# Patient Record
Sex: FEMALE | Race: BLACK OR AFRICAN AMERICAN | NOT HISPANIC OR LATINO | Employment: OTHER | URBAN - METROPOLITAN AREA
[De-identification: names, ages, dates, MRNs, and addresses within clinical notes are randomized per-mention and may not be internally consistent; named-entity substitution may affect disease eponyms.]

---

## 2017-04-26 ENCOUNTER — OFFICE VISIT (OUTPATIENT)
Dept: INTERNAL MEDICINE | Facility: CLINIC | Age: 82
End: 2017-04-26
Payer: MEDICARE

## 2017-04-26 VITALS
TEMPERATURE: 98 F | HEART RATE: 64 BPM | HEIGHT: 60 IN | BODY MASS INDEX: 15.33 KG/M2 | SYSTOLIC BLOOD PRESSURE: 150 MMHG | DIASTOLIC BLOOD PRESSURE: 66 MMHG | WEIGHT: 78.06 LBS
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
## 2017-04-26 DIAGNOSIS — M25.551 RIGHT HIP PAIN: Primary | ICD-10-CM

## 2017-04-26 DIAGNOSIS — N18.4 STAGE 4 CHRONIC KIDNEY DISEASE: ICD-10-CM

## 2017-04-26 DIAGNOSIS — I10 ESSENTIAL HYPERTENSION: ICD-10-CM

## 2017-04-26 PROCEDURE — 1126F AMNT PAIN NOTED NONE PRSNT: CPT | Mod: S$GLB,,, | Performed by: FAMILY MEDICINE

## 2017-04-26 PROCEDURE — 99213 OFFICE O/P EST LOW 20 MIN: CPT | Mod: S$GLB,,, | Performed by: FAMILY MEDICINE

## 2017-04-26 PROCEDURE — 99999 PR PBB SHADOW E&M-EST. PATIENT-LVL III: CPT | Mod: PBBFAC,,, | Performed by: FAMILY MEDICINE

## 2017-04-26 PROCEDURE — 1159F MED LIST DOCD IN RCRD: CPT | Mod: S$GLB,,, | Performed by: FAMILY MEDICINE

## 2017-04-26 PROCEDURE — 1160F RVW MEDS BY RX/DR IN RCRD: CPT | Mod: S$GLB,,, | Performed by: FAMILY MEDICINE

## 2017-04-26 PROCEDURE — 99499 UNLISTED E&M SERVICE: CPT | Mod: S$GLB,,, | Performed by: FAMILY MEDICINE

## 2017-04-26 RX ORDER — AMLODIPINE BESYLATE 5 MG/1
5 TABLET ORAL DAILY
Qty: 90 TABLET | Refills: 0 | Status: SHIPPED | OUTPATIENT
Start: 2017-04-26 | End: 2017-10-11 | Stop reason: SDUPTHER

## 2017-04-26 RX ORDER — TRAMADOL HYDROCHLORIDE 50 MG/1
25 TABLET ORAL EVERY 12 HOURS PRN
Qty: 20 TABLET | Refills: 0 | Status: SHIPPED | OUTPATIENT
Start: 2017-04-26 | End: 2017-10-11 | Stop reason: SDUPTHER

## 2017-04-26 NOTE — PROGRESS NOTES
Subjective:   Patient ID: Cesilia Delgado is a 86 y.o. female.    Chief Complaint: Hip Pain (hit it on the tub ) and Diarrhea (better today clear up on last evening)      HPI  85 yo female here with son. No diarrhea. This problem has resolved. She is here bc she accidentally sat down roughly in tub about one foot two days ago and right hip hurts. She is not limping. Pain is 6/10 and improving without any treatment. Son is concerned about possible fracture.  Patient queried and denies any further complaints.      ALLERGIES AND MEDICATIONS: updated and reviewed.  Review of patient's allergies indicates:  No Known Allergies    Current Outpatient Prescriptions:     amlodipine (NORVASC) 5 MG tablet, Take 1 tablet (5 mg total) by mouth once daily., Disp: 90 tablet, Rfl: 0    ferrous sulfate 325 mg (65 mg iron) Tab tablet, Take 1 tablet by mouth once daily., Disp: , Rfl: 0    nut.tx.impaired renal fxn,soy (NEPRO) 0.08-1.80 gram-kcal/mL Liqd, Take 1 Can by mouth once daily., Disp: 90 Can, Rfl: 4    omeprazole (PRILOSEC) 40 MG capsule, Take 1 capsule (40 mg total) by mouth 2 (two) times daily before meals., Disp: 180 capsule, Rfl: 3    pantoprazole (PROTONIX) 40 MG tablet, Take 1 tablet (40 mg total) by mouth 2 (two) times daily., Disp: 180 tablet, Rfl: 3    tramadol (ULTRAM) 50 mg tablet, Take 0.5 tablets (25 mg total) by mouth every 12 (twelve) hours as needed for Pain., Disp: 20 tablet, Rfl: 0    Review of Systems   Constitutional: Negative for chills, fatigue and fever.   Respiratory: Negative for cough and stridor.    Cardiovascular: Negative for chest pain and palpitations.   Musculoskeletal: Negative for back pain and myalgias.       Objective:     Vitals:    04/26/17 1603   BP: (!) 150/66   Pulse: 64   Temp: 97.7 °F (36.5 °C)   TempSrc: Oral   Weight: 35.4 kg (78 lb 0.7 oz)   Height: 5' (1.524 m)   PainSc: 0-No pain     Body mass index is 15.24 kg/(m^2).    Physical Exam   Constitutional: She appears  well-developed and well-nourished.   HENT:   Head: Normocephalic and atraumatic.   Right Ear: External ear normal.   Left Ear: External ear normal.   Pulmonary/Chest: Effort normal and breath sounds normal.   Abdominal: Soft. Bowel sounds are normal.   Musculoskeletal:        Right hip: She exhibits bony tenderness. She exhibits normal range of motion, normal strength, no tenderness, no swelling, no crepitus, no deformity and no laceration.   Neurological: She is alert.   Skin: Skin is warm and dry.   Psychiatric: She has a normal mood and affect. Her behavior is normal.   Nursing note and vitals reviewed.      Assessment and Plan:   Cesilia was seen today for hip pain and diarrhea.    Diagnoses and all orders for this visit:    Right hip pain  -     X-Ray Hip 2 View Right; Future    Fall, initial encounter    Essential hypertension    Stage 4 chronic kidney disease    Other orders  -     amlodipine (NORVASC) 5 MG tablet; Take 1 tablet (5 mg total) by mouth once daily.  -     tramadol (ULTRAM) 50 mg tablet; Take 0.5 tablets (25 mg total) by mouth every 12 (twelve) hours as needed for Pain.        Return in about 1 week (around 5/3/2017).    THIS NOTE WILL BE SHARED WITH THE PATIENT.

## 2017-04-27 ENCOUNTER — HOSPITAL ENCOUNTER (OUTPATIENT)
Dept: RADIOLOGY | Facility: HOSPITAL | Age: 82
Discharge: HOME | End: 2017-04-27
Attending: FAMILY MEDICINE
Payer: MEDICARE

## 2017-04-27 ENCOUNTER — TELEPHONE (OUTPATIENT)
Dept: INTERNAL MEDICINE | Facility: CLINIC | Age: 82
End: 2017-04-27

## 2017-04-27 DIAGNOSIS — M25.551 RIGHT HIP PAIN: ICD-10-CM

## 2017-04-27 PROCEDURE — 73502 X-RAY EXAM HIP UNI 2-3 VIEWS: CPT | Mod: 26,RT,, | Performed by: RADIOLOGY

## 2017-04-27 PROCEDURE — 73502 X-RAY EXAM HIP UNI 2-3 VIEWS: CPT | Mod: TC,RT

## 2017-04-28 ENCOUNTER — TELEPHONE (OUTPATIENT)
Dept: INTERNAL MEDICINE | Facility: CLINIC | Age: 82
End: 2017-04-28

## 2017-04-28 NOTE — TELEPHONE ENCOUNTER
Called pt son; informed him xray was normal. He did not have any questions or concerns at this time.

## 2017-10-11 RX ORDER — AMLODIPINE BESYLATE 5 MG/1
TABLET ORAL
Qty: 90 TABLET | Refills: 3 | Status: SHIPPED | OUTPATIENT
Start: 2017-10-11 | End: 2018-10-02 | Stop reason: SDUPTHER

## 2017-10-11 RX ORDER — TRAMADOL HYDROCHLORIDE 50 MG/1
TABLET ORAL
Qty: 30 TABLET | Refills: 3 | Status: ON HOLD | OUTPATIENT
Start: 2017-10-11 | End: 2018-12-23

## 2017-10-31 ENCOUNTER — TELEPHONE (OUTPATIENT)
Dept: NEPHROLOGY | Facility: CLINIC | Age: 82
End: 2017-10-31

## 2017-10-31 NOTE — TELEPHONE ENCOUNTER
Called pt awa cardoza to inform her ill have to talk with dr shay to see what day he can work her mother n law in    ----- Message from Estela Castaneda sent at 10/30/2017  5:13 PM CDT -----  Contact: Jose Enrique moe in law Estela can be reached at [phone]  Estela is calling to reschedule appt sometime next week.      Thank you!

## 2017-11-13 ENCOUNTER — LAB VISIT (OUTPATIENT)
Dept: LAB | Facility: HOSPITAL | Age: 82
End: 2017-11-13
Attending: INTERNAL MEDICINE
Payer: MEDICARE

## 2017-11-13 ENCOUNTER — OFFICE VISIT (OUTPATIENT)
Dept: INTERNAL MEDICINE | Facility: CLINIC | Age: 82
End: 2017-11-13
Payer: MEDICARE

## 2017-11-13 VITALS
TEMPERATURE: 98 F | BODY MASS INDEX: 13.62 KG/M2 | WEIGHT: 79.81 LBS | DIASTOLIC BLOOD PRESSURE: 82 MMHG | RESPIRATION RATE: 14 BRPM | HEIGHT: 64 IN | HEART RATE: 76 BPM | SYSTOLIC BLOOD PRESSURE: 182 MMHG

## 2017-11-13 DIAGNOSIS — E46 MALNUTRITION, UNSPECIFIED TYPE: Chronic | ICD-10-CM

## 2017-11-13 DIAGNOSIS — N18.4 STAGE 4 CHRONIC KIDNEY DISEASE: ICD-10-CM

## 2017-11-13 DIAGNOSIS — K21.9 GASTROESOPHAGEAL REFLUX DISEASE, ESOPHAGITIS PRESENCE NOT SPECIFIED: ICD-10-CM

## 2017-11-13 DIAGNOSIS — I10 ESSENTIAL HYPERTENSION: Primary | ICD-10-CM

## 2017-11-13 DIAGNOSIS — I10 ESSENTIAL HYPERTENSION: ICD-10-CM

## 2017-11-13 DIAGNOSIS — M25.551 RIGHT HIP PAIN: ICD-10-CM

## 2017-11-13 LAB
ALBUMIN SERPL BCP-MCNC: 4 G/DL
ALP SERPL-CCNC: 63 U/L
ALT SERPL W/O P-5'-P-CCNC: 6 U/L
ANION GAP SERPL CALC-SCNC: 10 MMOL/L
ANISOCYTOSIS BLD QL SMEAR: SLIGHT
AST SERPL-CCNC: 18 U/L
BASOPHILS # BLD AUTO: 0.05 K/UL
BASOPHILS NFR BLD: 0.8 %
BILIRUB SERPL-MCNC: 0.9 MG/DL
BUN SERPL-MCNC: 26 MG/DL
BURR CELLS BLD QL SMEAR: ABNORMAL
CALCIUM SERPL-MCNC: 10.7 MG/DL
CHLORIDE SERPL-SCNC: 104 MMOL/L
CHOLEST SERPL-MCNC: 259 MG/DL
CHOLEST/HDLC SERPL: 3.9 {RATIO}
CO2 SERPL-SCNC: 23 MMOL/L
CREAT SERPL-MCNC: 1.9 MG/DL
DIFFERENTIAL METHOD: ABNORMAL
EOSINOPHIL # BLD AUTO: 0.1 K/UL
EOSINOPHIL NFR BLD: 1.7 %
ERYTHROCYTE [DISTWIDTH] IN BLOOD BY AUTOMATED COUNT: 18 %
EST. GFR  (AFRICAN AMERICAN): 26.9 ML/MIN/1.73 M^2
EST. GFR  (NON AFRICAN AMERICAN): 23.4 ML/MIN/1.73 M^2
GLUCOSE SERPL-MCNC: 72 MG/DL
HCT VFR BLD AUTO: 31.1 %
HDLC SERPL-MCNC: 66 MG/DL
HDLC SERPL: 25.5 %
HGB BLD-MCNC: 10.3 G/DL
IMM GRANULOCYTES # BLD AUTO: 0.01 K/UL
IMM GRANULOCYTES NFR BLD AUTO: 0.2 %
LDLC SERPL CALC-MCNC: 170.2 MG/DL
LYMPHOCYTES # BLD AUTO: 1.3 K/UL
LYMPHOCYTES NFR BLD: 22.7 %
MCH RBC QN AUTO: 24.5 PG
MCHC RBC AUTO-ENTMCNC: 33.1 G/DL
MCV RBC AUTO: 74 FL
MONOCYTES # BLD AUTO: 0.3 K/UL
MONOCYTES NFR BLD: 5.6 %
NEUTROPHILS # BLD AUTO: 4.1 K/UL
NEUTROPHILS NFR BLD: 69 %
NONHDLC SERPL-MCNC: 193 MG/DL
NRBC BLD-RTO: 0 /100 WBC
OVALOCYTES BLD QL SMEAR: ABNORMAL
PLATELET # BLD AUTO: 198 K/UL
PMV BLD AUTO: ABNORMAL FL
POIKILOCYTOSIS BLD QL SMEAR: SLIGHT
POTASSIUM SERPL-SCNC: 4.4 MMOL/L
PROT SERPL-MCNC: 8.3 G/DL
RBC # BLD AUTO: 4.21 M/UL
SODIUM SERPL-SCNC: 137 MMOL/L
TARGETS BLD QL SMEAR: ABNORMAL
TRIGL SERPL-MCNC: 114 MG/DL
TSH SERPL DL<=0.005 MIU/L-ACNC: 1.2 UIU/ML
WBC # BLD AUTO: 5.91 K/UL

## 2017-11-13 PROCEDURE — 80053 COMPREHEN METABOLIC PANEL: CPT

## 2017-11-13 PROCEDURE — 99214 OFFICE O/P EST MOD 30 MIN: CPT | Mod: S$GLB,,, | Performed by: INTERNAL MEDICINE

## 2017-11-13 PROCEDURE — 99499 UNLISTED E&M SERVICE: CPT | Mod: S$GLB,,, | Performed by: INTERNAL MEDICINE

## 2017-11-13 PROCEDURE — 36415 COLL VENOUS BLD VENIPUNCTURE: CPT | Mod: PO

## 2017-11-13 PROCEDURE — 99999 PR PBB SHADOW E&M-EST. PATIENT-LVL III: CPT | Mod: PBBFAC,,, | Performed by: INTERNAL MEDICINE

## 2017-11-13 PROCEDURE — 90662 IIV NO PRSV INCREASED AG IM: CPT | Mod: S$GLB,,, | Performed by: INTERNAL MEDICINE

## 2017-11-13 PROCEDURE — G0008 ADMIN INFLUENZA VIRUS VAC: HCPCS | Mod: S$GLB,,, | Performed by: INTERNAL MEDICINE

## 2017-11-13 PROCEDURE — 84443 ASSAY THYROID STIM HORMONE: CPT

## 2017-11-13 PROCEDURE — 80061 LIPID PANEL: CPT

## 2017-11-13 NOTE — PROGRESS NOTES
Subjective:       Patient ID: Cesilia Delgado is a 87 y.o. female.    Chief Complaint: Follow-up (blood pressure)    HPI     87-year-old female here for blood pressure follow-up.    HTN - Patient is currently on Norvasc 5 mg. She does not check her BP at home. Side effects of medications note: none. Denies headaches, blurred vision, chest pain, shortness of breath, nausea.  Cholesterol   Date Value Ref Range Status   09/14/2016 192 120 - 199 mg/dL Final     Comment:     The National Cholesterol Education Program (NCEP) has set the  following guidelines (reference ranges) for Cholesterol:  Optimal.....................<200 mg/dL  Borderline High.............200-239 mg/dL  High........................> or = 240 mg/dL       Triglycerides   Date Value Ref Range Status   09/14/2016 123 30 - 150 mg/dL Final     Comment:     The National Cholesterol Education Program (NCEP) has set the  following guidelines (reference values) for triglycerides:  Normal......................<150 mg/dL  Borderline High.............150-199 mg/dL  High........................200-499 mg/dL       HDL   Date Value Ref Range Status   09/14/2016 44 40 - 75 mg/dL Final     Comment:     The National Cholesterol Education Program (NCEP) has set the  following guidelines (reference values) for HDL Cholesterol:  Low...............<40 mg/dL  Optimal...........>60 mg/dL       LDL Cholesterol   Date Value Ref Range Status   09/14/2016 123.4 63.0 - 159.0 mg/dL Final     Comment:     The National Cholesterol Education Program (NCEP) has set the  following guidelines (reference values) for LDL Cholesterol:  Optimal.......................<130 mg/dL  Borderline High...............130-159 mg/dL  High..........................160-189 mg/dL  Very High.....................>190 mg/dL       GERD - she is not taking anything currently for her reflux.    She has tramadol for pain.  She has pain in her right hip and thigh.  This hurts daily when she moves.      Review of  Systems    Objective:      Physical Exam   Constitutional: She is oriented to person, place, and time. She appears well-developed and well-nourished.   HENT:   Head: Normocephalic and atraumatic.   Mouth/Throat: No oropharyngeal exudate.   Eyes: EOM are normal. Pupils are equal, round, and reactive to light. Right eye exhibits no discharge. Left eye exhibits no discharge. No scleral icterus.   Neck: Normal range of motion. Neck supple. No tracheal deviation present. No thyromegaly present.   Cardiovascular: Normal rate, regular rhythm and normal heart sounds.  Exam reveals no gallop and no friction rub.    No murmur heard.  Pulmonary/Chest: Effort normal and breath sounds normal. No respiratory distress. She has no wheezes. She has no rales. She exhibits no tenderness.   Abdominal: Soft. Bowel sounds are normal. She exhibits no distension and no mass. There is no tenderness. There is no rebound and no guarding.   Musculoskeletal: Normal range of motion. She exhibits no edema or tenderness.   Neurological: She is alert and oriented to person, place, and time.   Skin: Skin is warm and dry. No rash noted. No erythema. No pallor.   Psychiatric: She has a normal mood and affect. Her behavior is normal.   Vitals reviewed.      Assessment:       1. Essential hypertension    2. Gastroesophageal reflux disease, esophagitis presence not specified    3. Stage 4 chronic kidney disease    4. Right hip pain    5. Malnutrition, unspecified type        Plan:       1.  Amlodipine 5 mg.  Patient advised check blood pressure weekly at the pharmacy for the next 3 weeks and let us noted is running.  2.  Monitor.  3.  Monitor.  4.  Discussed x-raying the hip again, decided against, because patient had an extra looked pretty good in April.  Continue with tramadol as needed.  Son states patient is not complaining overly much better hip.  5.  Encouraged supplementation with boost or with ensure.

## 2018-05-21 ENCOUNTER — TELEPHONE (OUTPATIENT)
Dept: INTERNAL MEDICINE | Facility: CLINIC | Age: 83
End: 2018-05-21

## 2018-05-21 DIAGNOSIS — I10 ESSENTIAL HYPERTENSION: ICD-10-CM

## 2018-05-21 DIAGNOSIS — Z00.00 ROUTINE GENERAL MEDICAL EXAMINATION AT A HEALTH CARE FACILITY: Primary | ICD-10-CM

## 2018-05-21 NOTE — TELEPHONE ENCOUNTER
----- Message from Patric Leger sent at 5/21/2018 12:08 PM CDT -----  Doctor appointment and lab have been scheduled.  Please link lab orders to the lab appointment.  Date of doctor appointment:  9/21/18  Physical or EP:  EPP  Date of lab appointment:  9/17/18

## 2018-10-02 RX ORDER — AMLODIPINE BESYLATE 5 MG/1
TABLET ORAL
Qty: 90 TABLET | Refills: 3 | Status: SHIPPED | OUTPATIENT
Start: 2018-10-02 | End: 2018-10-04 | Stop reason: SDUPTHER

## 2018-10-04 ENCOUNTER — OFFICE VISIT (OUTPATIENT)
Dept: INTERNAL MEDICINE | Facility: CLINIC | Age: 83
End: 2018-10-04
Payer: MEDICARE

## 2018-10-04 ENCOUNTER — LAB VISIT (OUTPATIENT)
Dept: LAB | Facility: HOSPITAL | Age: 83
End: 2018-10-04
Attending: INTERNAL MEDICINE
Payer: MEDICARE

## 2018-10-04 VITALS
DIASTOLIC BLOOD PRESSURE: 70 MMHG | WEIGHT: 83.56 LBS | SYSTOLIC BLOOD PRESSURE: 146 MMHG | BODY MASS INDEX: 15.38 KG/M2 | TEMPERATURE: 98 F | HEART RATE: 72 BPM | HEIGHT: 62 IN

## 2018-10-04 DIAGNOSIS — Z00.00 HEALTHCARE MAINTENANCE: Primary | ICD-10-CM

## 2018-10-04 DIAGNOSIS — K21.9 GASTROESOPHAGEAL REFLUX DISEASE WITHOUT ESOPHAGITIS: ICD-10-CM

## 2018-10-04 DIAGNOSIS — Z23 NEED FOR VACCINATION WITH 13-POLYVALENT PNEUMOCOCCAL CONJUGATE VACCINE: ICD-10-CM

## 2018-10-04 DIAGNOSIS — N18.4 STAGE 4 CHRONIC KIDNEY DISEASE: Chronic | ICD-10-CM

## 2018-10-04 DIAGNOSIS — L20.82 FLEXURAL ECZEMA: ICD-10-CM

## 2018-10-04 DIAGNOSIS — I10 ESSENTIAL HYPERTENSION: ICD-10-CM

## 2018-10-04 DIAGNOSIS — E46 MALNUTRITION, UNSPECIFIED TYPE: Chronic | ICD-10-CM

## 2018-10-04 DIAGNOSIS — Z23 NEED FOR SHINGLES VACCINE: ICD-10-CM

## 2018-10-04 DIAGNOSIS — I10 ESSENTIAL HYPERTENSION: Chronic | ICD-10-CM

## 2018-10-04 DIAGNOSIS — Z78.0 ASYMPTOMATIC POSTMENOPAUSAL STATE: ICD-10-CM

## 2018-10-04 LAB
ALBUMIN SERPL BCP-MCNC: 3.7 G/DL
ALP SERPL-CCNC: 73 U/L
ALT SERPL W/O P-5'-P-CCNC: <5 U/L
ANION GAP SERPL CALC-SCNC: 10 MMOL/L
AST SERPL-CCNC: 13 U/L
BASOPHILS # BLD AUTO: 0.05 K/UL
BASOPHILS NFR BLD: 0.8 %
BILIRUB SERPL-MCNC: 0.6 MG/DL
BUN SERPL-MCNC: 30 MG/DL
CALCIUM SERPL-MCNC: 10 MG/DL
CHLORIDE SERPL-SCNC: 108 MMOL/L
CHOLEST SERPL-MCNC: 199 MG/DL
CHOLEST/HDLC SERPL: 3.6 {RATIO}
CO2 SERPL-SCNC: 21 MMOL/L
CREAT SERPL-MCNC: 2 MG/DL
DIFFERENTIAL METHOD: ABNORMAL
EOSINOPHIL # BLD AUTO: 0.4 K/UL
EOSINOPHIL NFR BLD: 5.7 %
ERYTHROCYTE [DISTWIDTH] IN BLOOD BY AUTOMATED COUNT: 18.2 %
EST. GFR  (AFRICAN AMERICAN): 25.3 ML/MIN/1.73 M^2
EST. GFR  (NON AFRICAN AMERICAN): 22 ML/MIN/1.73 M^2
GLUCOSE SERPL-MCNC: 81 MG/DL
HCT VFR BLD AUTO: 29.7 %
HDLC SERPL-MCNC: 56 MG/DL
HDLC SERPL: 28.1 %
HGB BLD-MCNC: 9.8 G/DL
IMM GRANULOCYTES # BLD AUTO: 0.02 K/UL
IMM GRANULOCYTES NFR BLD AUTO: 0.3 %
LDLC SERPL CALC-MCNC: 130.6 MG/DL
LYMPHOCYTES # BLD AUTO: 1.7 K/UL
LYMPHOCYTES NFR BLD: 26.8 %
MCH RBC QN AUTO: 25.3 PG
MCHC RBC AUTO-ENTMCNC: 33 G/DL
MCV RBC AUTO: 77 FL
MONOCYTES # BLD AUTO: 0.5 K/UL
MONOCYTES NFR BLD: 7.6 %
NEUTROPHILS # BLD AUTO: 3.8 K/UL
NEUTROPHILS NFR BLD: 58.8 %
NONHDLC SERPL-MCNC: 143 MG/DL
NRBC BLD-RTO: 0 /100 WBC
PLATELET # BLD AUTO: 258 K/UL
PMV BLD AUTO: 10.5 FL
POTASSIUM SERPL-SCNC: 4.6 MMOL/L
PROT SERPL-MCNC: 7.8 G/DL
RBC # BLD AUTO: 3.87 M/UL
SODIUM SERPL-SCNC: 139 MMOL/L
TRIGL SERPL-MCNC: 62 MG/DL
TSH SERPL DL<=0.005 MIU/L-ACNC: 1.44 UIU/ML
WBC # BLD AUTO: 6.46 K/UL

## 2018-10-04 PROCEDURE — 99999 PR PBB SHADOW E&M-EST. PATIENT-LVL IV: CPT | Mod: PBBFAC,,, | Performed by: INTERNAL MEDICINE

## 2018-10-04 PROCEDURE — 80053 COMPREHEN METABOLIC PANEL: CPT

## 2018-10-04 PROCEDURE — 85025 COMPLETE CBC W/AUTO DIFF WBC: CPT

## 2018-10-04 PROCEDURE — 99214 OFFICE O/P EST MOD 30 MIN: CPT | Mod: PBBFAC,PO,25 | Performed by: INTERNAL MEDICINE

## 2018-10-04 PROCEDURE — 90670 PCV13 VACCINE IM: CPT | Mod: PBBFAC,PO

## 2018-10-04 PROCEDURE — 1101F PT FALLS ASSESS-DOCD LE1/YR: CPT | Mod: CPTII,,, | Performed by: INTERNAL MEDICINE

## 2018-10-04 PROCEDURE — 36415 COLL VENOUS BLD VENIPUNCTURE: CPT | Mod: PO

## 2018-10-04 PROCEDURE — 84443 ASSAY THYROID STIM HORMONE: CPT

## 2018-10-04 PROCEDURE — 99499 UNLISTED E&M SERVICE: CPT | Mod: S$GLB,,, | Performed by: INTERNAL MEDICINE

## 2018-10-04 PROCEDURE — 90662 IIV NO PRSV INCREASED AG IM: CPT | Mod: PBBFAC,PO

## 2018-10-04 PROCEDURE — 80061 LIPID PANEL: CPT

## 2018-10-04 PROCEDURE — 99215 OFFICE O/P EST HI 40 MIN: CPT | Mod: S$PBB,,, | Performed by: INTERNAL MEDICINE

## 2018-10-04 RX ORDER — AMLODIPINE BESYLATE 5 MG/1
5 TABLET ORAL DAILY
Qty: 30 TABLET | Refills: 11 | Status: ON HOLD | OUTPATIENT
Start: 2018-10-04 | End: 2019-01-08 | Stop reason: HOSPADM

## 2018-10-04 RX ORDER — FAMOTIDINE 40 MG/1
40 TABLET, FILM COATED ORAL 2 TIMES DAILY PRN
Qty: 60 TABLET | Refills: 3 | Status: ON HOLD | OUTPATIENT
Start: 2018-10-04 | End: 2018-12-28 | Stop reason: HOSPADM

## 2018-10-04 NOTE — PROGRESS NOTES
Subjective:       Patient ID: Cesilia Delgado is a 87 y.o. female.    Chief Complaint: Annual Exam    HPI  This is a new patient to me but established to [facility].    87 y.o. female here to discuss healthcare maintenance and f/u of chronic medical conditions.      Health Maintenance:   Lipid disorders/ASCVD risk: 11/2017    DM: FBG - pending  Eye exam: due  Bone Density (F>64 yo, M >71yo): DEXA due       Vaccines:   Influenza (yearly) due   Tetanus (every 10 yrs - 1st tdap) defer    PPSV23(>66yo or <65 w/ lung dz, smoking, DM) 2013   PCV13 (> 65 or <65 w/ immunocompromised) due   Zoster (>59yo) due       Medical Problems:  1. HTN: controlled w/ amlodipine  2. GERD: has been out of pantoprazole for a while and symptoms were not present while out of med.  3. Malnutrition: attempting to eat more. She has gained 3 pounds.       Past Medical History:   Diagnosis Date    Anemia     Asthma     CKD (chronic kidney disease), stage III     Hypertension 5/28/2013    Pelvic mass in female     Pulmonary hypertension      Past Surgical History:   Procedure Laterality Date    ESOPHAGOGASTRODUODENOSCOPY      ESOPHAGOGASTRODUODENOSCOPY (EGD) N/A 12/20/2016    [facility]formed [facility] ([facility]ND [facility])    HIP SURGERY Right      Family History   Problem Relation Age of Onset    Hypertension Mother     Cancer Sister         breast    Diabetes Sister     Heart disease Sister     Diabetes Son     Colon polyps Son     Celiac disease Neg Hx     Colon cancer Neg Hx     Cystic fibrosis Neg Hx     Esophageal cancer Neg Hx     Hemochromatosis Neg Hx     Inflammatory bowel disease Neg Hx     Liver disease Neg Hx     Stomach cancer Neg Hx     Wally's disease Neg Hx      Social History     Socioeconomic History    Marital status:      Spouse name: Not on file    Number of children: Not on file    Years of education: Not on file    Highest education level: Not on file   Social Needs    Financial  resource strain: Not on file    Food insecurity - worry: Not on file    Food insecurity - inability: Not on file    Transportation needs - medical: Not on file    Transportation needs - non-medical: Not on file   Occupational History    Occupation: retired   Tobacco Use    Smoking status: Former Smoker     Start date: 7/1/2007    Smokeless tobacco: Never Used   Substance and Sexual Activity    Alcohol use: No     Alcohol/week: 0.0 oz    Drug use: No    Sexual activity: Yes     Partners: Male   Other Topics Concern    Are you pregnant or think you may be? Not Asked    Breast-feeding Not Asked   Social History Narrative    Not on file     Review of patient's allergies indicates:  No Known Allergies    Current Outpatient Medications:     amLODIPine (NORVASC) 5 MG tablet, Take 1 tablet (5 mg total) by mouth once daily., Disp: 30 tablet, Rfl: 11    famotidine (PEPCID) 40 MG tablet, Take 1 tablet (40 mg total) by mouth 2 (two) times daily as needed for Heartburn., Disp: 60 tablet, Rfl: 3    ferrous sulfate 325 mg (65 mg iron) Tab tablet, Take 1 tablet by mouth once daily., Disp: , Rfl: 0    nut.tx.impaired renal fxn,soy (NEPRO) 0.08-1.80 gram-kcal/mL Liqd, Take 1 Can by mouth once daily., Disp: 90 Can, Rfl: 4    tramadol (ULTRAM) 50 mg tablet, TAKE 1/2 TABLET BY MOUTH EVERY 12 HOURS AS NEEDED FOR PAIN, Disp: 30 tablet, Rfl: 3    varicella-zoster gE-AS01B, PF, (SHINGRIX, PF,) 50 mcg/0.5 mL injection, Inject 0.5 mLs into the muscle once. for 1 dose, Disp: 0.5 mL, Rfl: 1    Review of Systems   Constitutional: Negative for fever and unexpected weight change.   HENT: Negative for sinus pain and trouble swallowing.    Eyes: Negative for discharge and redness.   Respiratory: Negative for cough and shortness of breath.    Cardiovascular: Negative for chest pain and leg swelling.   Gastrointestinal: Negative for abdominal pain, blood in stool, constipation and diarrhea.   Genitourinary: Negative for dysuria and  "frequency.   Musculoskeletal: Negative for gait problem and joint swelling.   Skin: Negative for pallor and wound.   Neurological: Negative for numbness and headaches.       Objective:        Vitals:    10/04/18 1025   BP: (!) 146/70   BP Location: Right arm   Patient Position: Sitting   BP Method: Medium (Manual)   Pulse: 72   Temp: 97.7 °F (36.5 °C)   TempSrc: Oral   Weight: 37.9 kg (83 lb 8.9 oz)   Height: 5' 1.75" (1.568 m)       Body mass index is 15.41 kg/m².    Physical Exam   Constitutional: She appears cachectic. No distress.   HENT:   Head: Normocephalic and atraumatic.   Nose: Nose normal.   Mouth/Throat: No oropharyngeal exudate.   Eyes: Conjunctivae and EOM are normal. Right eye exhibits no discharge. Left eye exhibits no discharge.   Neck: Normal range of motion. Neck supple.   Cardiovascular: Normal rate, regular rhythm, normal heart sounds and intact distal pulses.   Pulmonary/Chest: Effort normal and breath sounds normal.   Abdominal: Soft. Bowel sounds are normal.   Musculoskeletal: She exhibits no edema.   Lymphadenopathy:     She has no cervical adenopathy.   Neurological: She is alert.   Skin: Skin is warm and dry. She is not diaphoretic. No erythema.   Psychiatric: She has a normal mood and affect. Her behavior is normal. Thought content normal.       Assessment:     1. Healthcare maintenance    2. Stage 4 chronic kidney disease    3. Essential hypertension    4. Gastroesophageal reflux disease without esophagitis    5. Need for vaccination with 13-polyvalent pneumococcal conjugate vaccine    6. Need for shingles vaccine    7. Asymptomatic postmenopausal state    8. Flexural eczema    9. Malnutrition, unspecified type           Plan:         1. Healthcare maintenance  - labs in process  - flu vaccine today    2. Stage 4 chronic kidney disease  - she has established w/ nephrology  - creatinine pending    3. Essential hypertension  - amLODIPine (NORVASC) 5 MG tablet; Take 1 tablet (5 mg total) by " mouth once daily.  Dispense: 30 tablet; Refill: 11    4. Gastroesophageal reflux disease without esophagitis  - deescalate from PPI if symptoms controlled  - famotidine (PEPCID) 40 MG tablet; Take 1 tablet (40 mg total) by mouth 2 (two) times daily as needed for Heartburn.  Dispense: 60 tablet; Refill: 3    5. Need for vaccination with 13-polyvalent pneumococcal conjugate vaccine  - Pneumococcal Conjugate Vaccine (13 Valent) (IM)    6. Need for shingles vaccine  - varicella-zoster gE-AS01B, PF, (SHINGRIX, PF,) 50 mcg/0.5 mL injection; Inject 0.5 mLs into the muscle once. for 1 dose  Dispense: 0.5 mL; Refill: 1    7. Asymptomatic postmenopausal state  - DXA Bone Density Spine And Hip; Future    8. Flexural eczema  - her daughter in law wanted eczema added to her medical record  - not currently active  - she uses otc topical agents prn    9. Malnutrition, unspecified type  -  Her family is working on improving her nutrition. She has gained 3 pounds.            Patient note was created using MModal Dictation.  Any errors in syntax or even information may not have been identified and edited on initial review prior to signing this note.

## 2018-10-08 ENCOUNTER — APPOINTMENT (OUTPATIENT)
Dept: RADIOLOGY | Facility: CLINIC | Age: 83
End: 2018-10-08
Attending: INTERNAL MEDICINE
Payer: MEDICARE

## 2018-10-08 DIAGNOSIS — Z78.0 ASYMPTOMATIC POSTMENOPAUSAL STATE: ICD-10-CM

## 2018-10-08 PROCEDURE — 77080 DXA BONE DENSITY AXIAL: CPT | Mod: 26,,, | Performed by: INTERNAL MEDICINE

## 2018-10-08 PROCEDURE — 77080 DXA BONE DENSITY AXIAL: CPT | Mod: TC,PO

## 2018-10-21 ENCOUNTER — TELEPHONE (OUTPATIENT)
Dept: INTERNAL MEDICINE | Facility: CLINIC | Age: 83
End: 2018-10-21

## 2018-10-21 DIAGNOSIS — M81.0 AGE-RELATED OSTEOPOROSIS WITHOUT CURRENT PATHOLOGICAL FRACTURE: ICD-10-CM

## 2018-10-21 NOTE — TELEPHONE ENCOUNTER
Please inform patient of test results:   Bone density scan shows osteoporosis. I recommend taking at least calcium 1200mg and Vitamin D 1000 units daily. There is a class of medications called bisphosphonates that can help prevent further thinning of the bones. A rare but serious side effect is wearing away of the jaw bone. You will need to have clearance from your dentist prior to starting this medication because any dental procedures or poor dentition can increase this risk.

## 2018-12-05 ENCOUNTER — TELEPHONE (OUTPATIENT)
Dept: INTERNAL MEDICINE | Facility: CLINIC | Age: 83
End: 2018-12-05

## 2018-12-05 NOTE — TELEPHONE ENCOUNTER
Called Estela she is the daughter n law she advised her Cesilia son passed away. She states she does not get out the bed she has no wounds. Appt was scheduled.

## 2018-12-05 NOTE — TELEPHONE ENCOUNTER
----- Message from Ashlee Bella sent at 12/5/2018 11:31 AM CST -----  Contact: Estela/daughter in law/ [phone]  Please call to discuss home health for patient. Patient status has declined.

## 2018-12-11 ENCOUNTER — OFFICE VISIT (OUTPATIENT)
Dept: INTERNAL MEDICINE | Facility: CLINIC | Age: 83
End: 2018-12-11
Payer: MEDICARE

## 2018-12-11 VITALS
DIASTOLIC BLOOD PRESSURE: 80 MMHG | BODY MASS INDEX: 13.48 KG/M2 | WEIGHT: 76.06 LBS | HEART RATE: 64 BPM | TEMPERATURE: 99 F | SYSTOLIC BLOOD PRESSURE: 110 MMHG | HEIGHT: 63 IN

## 2018-12-11 DIAGNOSIS — Z11.1 SCREENING-PULMONARY TB: ICD-10-CM

## 2018-12-11 DIAGNOSIS — I10 ESSENTIAL HYPERTENSION: Primary | ICD-10-CM

## 2018-12-11 DIAGNOSIS — N18.4 STAGE 4 CHRONIC KIDNEY DISEASE: ICD-10-CM

## 2018-12-11 PROCEDURE — 3288F FALL RISK ASSESSMENT DOCD: CPT | Mod: CPTII,S$GLB,, | Performed by: INTERNAL MEDICINE

## 2018-12-11 PROCEDURE — 99213 OFFICE O/P EST LOW 20 MIN: CPT | Mod: S$GLB,,, | Performed by: INTERNAL MEDICINE

## 2018-12-11 PROCEDURE — 1100F PTFALLS ASSESS-DOCD GE2>/YR: CPT | Mod: CPTII,S$GLB,, | Performed by: INTERNAL MEDICINE

## 2018-12-11 PROCEDURE — 99999 PR PBB SHADOW E&M-EST. PATIENT-LVL III: CPT | Mod: PBBFAC,,, | Performed by: INTERNAL MEDICINE

## 2018-12-11 RX ORDER — ZOSTER VACCINE RECOMBINANT, ADJUVANTED 50 MCG/0.5
KIT INTRAMUSCULAR
Status: ON HOLD | COMMUNITY
Start: 2018-10-17 | End: 2018-12-28 | Stop reason: HOSPADM

## 2018-12-11 NOTE — PROGRESS NOTES
CC: followup of hypertension  HPI:  The patient is a 88 y.o. year old female who presents to the office for followup of hypertension.  The patient denies any chest pain, shortness of breath, headache, blurred vision, excessive fatigue, nausea or vomiting.  She has been experiencing tremulousness, sleeping during the day.  She has been not eating a lot.  She has been complaining of right hip pain as well.    PAST MEDICAL HISTORY:  Past Medical History:   Diagnosis Date    Anemia     Asthma     CKD (chronic kidney disease), stage III     Hypertension 5/28/2013    Pelvic mass in female     Pulmonary hypertension        SURGICAL HISTORY:  Past Surgical History:   Procedure Laterality Date    ESOPHAGOGASTRODUODENOSCOPY      ESOPHAGOGASTRODUODENOSCOPY (EGD) N/A 12/20/2016    Performed by Eitan Coles MD at [facility] ([address] [place] [address])    HIP SURGERY Right        MEDS:  Medcard reviewed and updated    ALLERGIES: Allergy Card reviewed and updated    SOCIAL HISTORY:   The patient is a nonsmoker.    PE:   APPEARANCE: Well nourished, well developed, in no acute distress.   CHEST: Lungs clear to auscultation with unlabored respirations.  CARDIOVASCULAR: Normal S1, S2. No murmurs. No carotid bruits. No pedal edema.  ABDOMEN: Bowel sounds normal. Not distended. Soft. No tenderness or masses.  PSYCHIATRIC: The patient is oriented to person, place, and time and has a pleasant affect.        ASSESSMENT/PLAN:  Cesilia was seen today for altered mental status.    Diagnoses and all orders for this visit:    Essential hypertension  -     blood pressure is controlled    Screening-pulmonary TB  -     X-Ray Chest PA And Lateral; Future  -     POCT TB Skin Test    Stage 4 chronic kidney disease  -     stable

## 2018-12-12 PROCEDURE — 86580 TB INTRADERMAL TEST: CPT | Mod: S$GLB,,, | Performed by: INTERNAL MEDICINE

## 2018-12-13 ENCOUNTER — CLINICAL SUPPORT (OUTPATIENT)
Dept: INTERNAL MEDICINE | Facility: CLINIC | Age: 83
End: 2018-12-13
Payer: MEDICARE

## 2018-12-13 ENCOUNTER — HOSPITAL ENCOUNTER (OUTPATIENT)
Dept: RADIOLOGY | Facility: HOSPITAL | Age: 83
Discharge: HOME | End: 2018-12-13
Attending: INTERNAL MEDICINE
Payer: MEDICARE

## 2018-12-13 DIAGNOSIS — Z11.1 SCREENING-PULMONARY TB: ICD-10-CM

## 2018-12-13 LAB
TB INDURATION - 48 HR READ: NORMAL MM
TB INDURATION - 72 HR READ: NORMAL MM
TB SKIN TEST - 48 HR READ: NEGATIVE
TB SKIN TEST - 72 HR READ: NORMAL

## 2018-12-13 PROCEDURE — 71046 X-RAY EXAM CHEST 2 VIEWS: CPT | Mod: TC,PO

## 2018-12-13 PROCEDURE — 71046 X-RAY EXAM CHEST 2 VIEWS: CPT | Mod: 26,,, | Performed by: RADIOLOGY

## 2018-12-14 ENCOUNTER — TELEPHONE (OUTPATIENT)
Dept: INTERNAL MEDICINE | Facility: CLINIC | Age: 83
End: 2018-12-14

## 2018-12-19 ENCOUNTER — TELEPHONE (OUTPATIENT)
Dept: INTERNAL MEDICINE | Facility: CLINIC | Age: 83
End: 2018-12-19

## 2018-12-19 DIAGNOSIS — R68.89 OTHER GENERAL SYMPTOMS AND SIGNS: ICD-10-CM

## 2018-12-19 DIAGNOSIS — R63.0 ANOREXIA: Primary | ICD-10-CM

## 2018-12-19 RX ORDER — TRAZODONE HYDROCHLORIDE 50 MG/1
50 TABLET ORAL NIGHTLY
Qty: 30 TABLET | Refills: 11 | Status: ON HOLD | OUTPATIENT
Start: 2018-12-19 | End: 2019-01-08 | Stop reason: HOSPADM

## 2018-12-19 NOTE — TELEPHONE ENCOUNTER
I am unaware of any paperwork required for nursing home placement.  We prophylactically did chest x-ray, ppd and patient has had labs recently.  Please advise if there has been some paperwork sent over that needs to be completed.  Also, a prescription for trazodone has been sent to the pharmacy electronically.  Patient's lack of appetite may be due to infection.  Recommend checking urinalysis and urine culture.

## 2018-12-19 NOTE — TELEPHONE ENCOUNTER
Called and spoke with Riya and she states she f/u about a packet she submitted for nursing home placement  She advised she faxed a form to the office verified the number advised to resend and termed the call

## 2018-12-19 NOTE — TELEPHONE ENCOUNTER
----- Message from Rosamaria Beckwith sent at 12/18/2018  9:22 AM CST -----  Contact: Kristina daughter in law [phone]  Patient's daughter in law said that pt is having trouble sleeping and is up all night . She also is calling to ask about paperwork she needs for nursing home.Please call asap. Pt said that this is her third call.

## 2018-12-19 NOTE — TELEPHONE ENCOUNTER
----- Message from Heidi Israel sent at 12/18/2018 12:00 PM CST -----  Contact: [facility] [phone]  Kary Parks will like to speak to the nurse in regards to an follow up request for nursing home placement.

## 2018-12-20 ENCOUNTER — TELEPHONE (OUTPATIENT)
Dept: INTERNAL MEDICINE | Facility: CLINIC | Age: 83
End: 2018-12-20

## 2018-12-22 ENCOUNTER — HOSPITAL ENCOUNTER (INPATIENT)
Facility: HOSPITAL | Age: 83
LOS: 18 days | Discharge: NURSING, REHAB OR LONG-TERM CARE | DRG: 640 | End: 2019-01-09
Attending: EMERGENCY MEDICINE | Admitting: HOSPITALIST
Payer: MEDICARE

## 2018-12-22 DIAGNOSIS — N17.9 AKI (ACUTE KIDNEY INJURY): ICD-10-CM

## 2018-12-22 DIAGNOSIS — G47.00 INSOMNIA, UNSPECIFIED TYPE: ICD-10-CM

## 2018-12-22 DIAGNOSIS — E87.0 HYPERNATREMIA: Primary | ICD-10-CM

## 2018-12-22 DIAGNOSIS — I50.9 CHF (CONGESTIVE HEART FAILURE): ICD-10-CM

## 2018-12-22 DIAGNOSIS — I10 ESSENTIAL HYPERTENSION: Chronic | ICD-10-CM

## 2018-12-22 DIAGNOSIS — K59.00 CONSTIPATION: ICD-10-CM

## 2018-12-22 DIAGNOSIS — R41.82 ALTERED MENTAL STATUS: ICD-10-CM

## 2018-12-22 DIAGNOSIS — Z63.6 CAREGIVER BURDEN: ICD-10-CM

## 2018-12-22 DIAGNOSIS — N18.4 STAGE 4 CHRONIC KIDNEY DISEASE: Chronic | ICD-10-CM

## 2018-12-22 DIAGNOSIS — Z71.89 GOALS OF CARE, COUNSELING/DISCUSSION: ICD-10-CM

## 2018-12-22 DIAGNOSIS — E46 MALNUTRITION, UNSPECIFIED TYPE: Chronic | ICD-10-CM

## 2018-12-22 DIAGNOSIS — G93.40 ACUTE ENCEPHALOPATHY: ICD-10-CM

## 2018-12-22 DIAGNOSIS — Z51.5 PALLIATIVE CARE ENCOUNTER: ICD-10-CM

## 2018-12-22 DIAGNOSIS — R41.82 ALTERED MENTAL STATUS, UNSPECIFIED ALTERED MENTAL STATUS TYPE: ICD-10-CM

## 2018-12-22 DIAGNOSIS — R13.10 DYSPHAGIA, UNSPECIFIED TYPE: ICD-10-CM

## 2018-12-22 LAB
BACTERIA #/AREA URNS AUTO: ABNORMAL /HPF
BILIRUB UR QL STRIP: NEGATIVE
CLARITY UR REFRACT.AUTO: ABNORMAL
COLOR UR AUTO: YELLOW
GLUCOSE UR QL STRIP: NEGATIVE
HGB UR QL STRIP: NEGATIVE
HYALINE CASTS UR QL AUTO: 4 /LPF
KETONES UR QL STRIP: NEGATIVE
LEUKOCYTE ESTERASE UR QL STRIP: ABNORMAL
MICROSCOPIC COMMENT: ABNORMAL
NITRITE UR QL STRIP: NEGATIVE
PH UR STRIP: 5 [PH] (ref 5–8)
PROT UR QL STRIP: NEGATIVE
RBC #/AREA URNS AUTO: 1 /HPF (ref 0–4)
SP GR UR STRIP: 1.02 (ref 1–1.03)
SQUAMOUS #/AREA URNS AUTO: 1 /HPF
URN SPEC COLLECT METH UR: ABNORMAL
WBC #/AREA URNS AUTO: 3 /HPF (ref 0–5)

## 2018-12-22 PROCEDURE — 84443 ASSAY THYROID STIM HORMONE: CPT

## 2018-12-22 PROCEDURE — 99291 PR CRITICAL CARE, E/M 30-74 MINUTES: ICD-10-PCS | Mod: ,,, | Performed by: EMERGENCY MEDICINE

## 2018-12-22 PROCEDURE — 93010 EKG 12-LEAD: ICD-10-PCS | Mod: ,,, | Performed by: INTERNAL MEDICINE

## 2018-12-22 PROCEDURE — 12000002 HC ACUTE/MED SURGE SEMI-PRIVATE ROOM

## 2018-12-22 PROCEDURE — 81001 URINALYSIS AUTO W/SCOPE: CPT

## 2018-12-22 PROCEDURE — 93010 ELECTROCARDIOGRAM REPORT: CPT | Mod: ,,, | Performed by: INTERNAL MEDICINE

## 2018-12-22 PROCEDURE — 99291 CRITICAL CARE FIRST HOUR: CPT | Mod: 25

## 2018-12-22 PROCEDURE — 99291 CRITICAL CARE FIRST HOUR: CPT | Mod: ,,, | Performed by: EMERGENCY MEDICINE

## 2018-12-22 PROCEDURE — 85025 COMPLETE CBC W/AUTO DIFF WBC: CPT

## 2018-12-22 PROCEDURE — 83735 ASSAY OF MAGNESIUM: CPT

## 2018-12-22 PROCEDURE — 93005 ELECTROCARDIOGRAM TRACING: CPT

## 2018-12-22 PROCEDURE — 84484 ASSAY OF TROPONIN QUANT: CPT

## 2018-12-22 PROCEDURE — 83605 ASSAY OF LACTIC ACID: CPT

## 2018-12-22 PROCEDURE — 83690 ASSAY OF LIPASE: CPT

## 2018-12-22 PROCEDURE — 80053 COMPREHEN METABOLIC PANEL: CPT

## 2018-12-22 SDOH — SOCIAL DETERMINANTS OF HEALTH (SDOH): DEPENDENT RELATIVE NEEDING CARE AT HOME: Z63.6

## 2018-12-22 NOTE — LETTER
December 25, 2018     Dear Kristina Delgado,    We are pleased to provide you with secure, online access to medical information via [facility] for: Cesilia Delgado       How Do I Sign Up?  Activating a [facility] account is as easy as 1-2-3!     1. Visit [email or web address] and enter this activation code and your date of birth, then select Next.  [email or web address]  2. Create a username and password to use when you visit [facility] in the future and select a security question in case you lose your password and select Next.  3. Enter your e-mail address and click Sign Up!       Additional Information  If you have questions, please e-mail [email or web address] or call [phone] to talk to our [facility] staff. Remember, [facility] is NOT to be used for urgent needs. For non-life threatening issues outside of normal clinic hours, call our after-hours nurse care line, [facility] at [phone]. For medical emergencies, dial 911.     Sincerely,    Your [facility] Team

## 2018-12-23 PROBLEM — R13.10 DYSPHAGIA: Status: ACTIVE | Noted: 2018-12-23

## 2018-12-23 PROBLEM — E87.8 ELECTROLYTE DISTURBANCE: Status: ACTIVE | Noted: 2018-12-23

## 2018-12-23 PROBLEM — R45.86 MOOD DISTURBANCE: Status: ACTIVE | Noted: 2018-12-23

## 2018-12-23 PROBLEM — G93.40 ACUTE ENCEPHALOPATHY: Status: ACTIVE | Noted: 2018-12-23

## 2018-12-23 PROBLEM — Z71.89 GOALS OF CARE, COUNSELING/DISCUSSION: Status: ACTIVE | Noted: 2018-12-23

## 2018-12-23 PROBLEM — G47.00 INSOMNIA: Status: ACTIVE | Noted: 2018-12-23

## 2018-12-23 PROBLEM — Z63.6 CAREGIVER BURDEN: Status: ACTIVE | Noted: 2018-12-23

## 2018-12-23 PROBLEM — E87.0 HYPERNATREMIA: Status: ACTIVE | Noted: 2018-12-23

## 2018-12-23 LAB
25(OH)D3+25(OH)D2 SERPL-MCNC: 14 NG/ML
ALBUMIN SERPL BCP-MCNC: 3.7 G/DL
ALBUMIN SERPL BCP-MCNC: 3.8 G/DL
ALP SERPL-CCNC: 63 U/L
ALP SERPL-CCNC: 66 U/L
ALT SERPL W/O P-5'-P-CCNC: 7 U/L
ALT SERPL W/O P-5'-P-CCNC: 9 U/L
AMORPH CRY UR QL COMP ASSIST: ABNORMAL
ANION GAP SERPL CALC-SCNC: ABNORMAL MMOL/L
AST SERPL-CCNC: 12 U/L
AST SERPL-CCNC: 22 U/L
BACTERIA #/AREA URNS AUTO: ABNORMAL /HPF
BASOPHILS # BLD AUTO: 0.04 K/UL
BASOPHILS # BLD AUTO: 0.05 K/UL
BASOPHILS NFR BLD: 0.4 %
BASOPHILS NFR BLD: 0.4 %
BILIRUB SERPL-MCNC: 0.8 MG/DL
BILIRUB SERPL-MCNC: 0.8 MG/DL
BILIRUB UR QL STRIP: NEGATIVE
BUN SERPL-MCNC: 108 MG/DL
BUN SERPL-MCNC: 111 MG/DL
BUN SERPL-MCNC: 112 MG/DL
BUN SERPL-MCNC: 114 MG/DL
BUN SERPL-MCNC: 118 MG/DL
BUN SERPL-MCNC: 97 MG/DL
CALCIUM SERPL-MCNC: 10.2 MG/DL
CALCIUM SERPL-MCNC: 10.3 MG/DL
CALCIUM SERPL-MCNC: 11 MG/DL
CALCIUM SERPL-MCNC: 11 MG/DL
CALCIUM SERPL-MCNC: 9.3 MG/DL
CALCIUM SERPL-MCNC: 9.8 MG/DL
CHLORIDE SERPL-SCNC: >130 MMOL/L
CLARITY UR REFRACT.AUTO: ABNORMAL
CO2 SERPL-SCNC: 14 MMOL/L
CO2 SERPL-SCNC: 16 MMOL/L
CO2 SERPL-SCNC: 17 MMOL/L
CO2 SERPL-SCNC: 18 MMOL/L
CO2 SERPL-SCNC: 19 MMOL/L
CO2 SERPL-SCNC: 20 MMOL/L
COLOR UR AUTO: YELLOW
CREAT SERPL-MCNC: 4.3 MG/DL
CREAT SERPL-MCNC: 4.5 MG/DL
CREAT SERPL-MCNC: 4.9 MG/DL
CREAT SERPL-MCNC: 4.9 MG/DL
CREAT SERPL-MCNC: 5.6 MG/DL
CREAT SERPL-MCNC: 5.6 MG/DL
DIFFERENTIAL METHOD: ABNORMAL
DIFFERENTIAL METHOD: ABNORMAL
EOSINOPHIL # BLD AUTO: 0 K/UL
EOSINOPHIL # BLD AUTO: 0.1 K/UL
EOSINOPHIL NFR BLD: 0.4 %
EOSINOPHIL NFR BLD: 0.6 %
ERYTHROCYTE [DISTWIDTH] IN BLOOD BY AUTOMATED COUNT: 19.9 %
ERYTHROCYTE [DISTWIDTH] IN BLOOD BY AUTOMATED COUNT: 19.9 %
EST. GFR  (AFRICAN AMERICAN): 10 ML/MIN/1.73 M^2
EST. GFR  (AFRICAN AMERICAN): 7.2 ML/MIN/1.73 M^2
EST. GFR  (AFRICAN AMERICAN): 7.2 ML/MIN/1.73 M^2
EST. GFR  (AFRICAN AMERICAN): 8.5 ML/MIN/1.73 M^2
EST. GFR  (AFRICAN AMERICAN): 8.5 ML/MIN/1.73 M^2
EST. GFR  (AFRICAN AMERICAN): 9.4 ML/MIN/1.73 M^2
EST. GFR  (NON AFRICAN AMERICAN): 6.3 ML/MIN/1.73 M^2
EST. GFR  (NON AFRICAN AMERICAN): 6.3 ML/MIN/1.73 M^2
EST. GFR  (NON AFRICAN AMERICAN): 7.4 ML/MIN/1.73 M^2
EST. GFR  (NON AFRICAN AMERICAN): 7.4 ML/MIN/1.73 M^2
EST. GFR  (NON AFRICAN AMERICAN): 8.2 ML/MIN/1.73 M^2
EST. GFR  (NON AFRICAN AMERICAN): 8.6 ML/MIN/1.73 M^2
FOLATE SERPL-MCNC: 9.4 NG/ML
GLUCOSE SERPL-MCNC: 108 MG/DL
GLUCOSE SERPL-MCNC: 117 MG/DL
GLUCOSE SERPL-MCNC: 125 MG/DL
GLUCOSE SERPL-MCNC: 139 MG/DL
GLUCOSE SERPL-MCNC: 95 MG/DL
GLUCOSE SERPL-MCNC: 96 MG/DL
GLUCOSE UR QL STRIP: NEGATIVE
HCT VFR BLD AUTO: 39.9 %
HCT VFR BLD AUTO: 41.9 %
HGB BLD-MCNC: 12.8 G/DL
HGB BLD-MCNC: 13.1 G/DL
HGB UR QL STRIP: NEGATIVE
HYALINE CASTS UR QL AUTO: 30 /LPF
IMM GRANULOCYTES # BLD AUTO: 0.02 K/UL
IMM GRANULOCYTES # BLD AUTO: 0.03 K/UL
IMM GRANULOCYTES NFR BLD AUTO: 0.2 %
IMM GRANULOCYTES NFR BLD AUTO: 0.3 %
KETONES UR QL STRIP: NEGATIVE
LACTATE SERPL-SCNC: 0.9 MMOL/L
LACTATE SERPL-SCNC: 1.2 MMOL/L
LACTATE SERPL-SCNC: 2.3 MMOL/L
LEUKOCYTE ESTERASE UR QL STRIP: ABNORMAL
LIPASE SERPL-CCNC: 198 U/L
LIPASE SERPL-CCNC: 212 U/L
LYMPHOCYTES # BLD AUTO: 0.9 K/UL
LYMPHOCYTES # BLD AUTO: 2 K/UL
LYMPHOCYTES NFR BLD: 17.5 %
LYMPHOCYTES NFR BLD: 8.5 %
MAGNESIUM SERPL-MCNC: 3 MG/DL
MAGNESIUM SERPL-MCNC: 3.1 MG/DL
MAGNESIUM SERPL-MCNC: 3.3 MG/DL
MAGNESIUM SERPL-MCNC: 3.4 MG/DL
MAGNESIUM SERPL-MCNC: 3.9 MG/DL
MCH RBC QN AUTO: 24.2 PG
MCH RBC QN AUTO: 24.4 PG
MCHC RBC AUTO-ENTMCNC: 31.3 G/DL
MCHC RBC AUTO-ENTMCNC: 32.1 G/DL
MCV RBC AUTO: 76 FL
MCV RBC AUTO: 77 FL
MICROSCOPIC COMMENT: ABNORMAL
MONOCYTES # BLD AUTO: 0.5 K/UL
MONOCYTES # BLD AUTO: 0.5 K/UL
MONOCYTES NFR BLD: 4.4 %
MONOCYTES NFR BLD: 4.6 %
NEUTROPHILS # BLD AUTO: 8.6 K/UL
NEUTROPHILS # BLD AUTO: 8.7 K/UL
NEUTROPHILS NFR BLD: 76.6 %
NEUTROPHILS NFR BLD: 86.1 %
NITRITE UR QL STRIP: NEGATIVE
NRBC BLD-RTO: 1 /100 WBC
NRBC BLD-RTO: 1 /100 WBC
PH UR STRIP: 5 [PH] (ref 5–8)
PHOSPHATE SERPL-MCNC: 3.9 MG/DL
PHOSPHATE SERPL-MCNC: 4.3 MG/DL
PHOSPHATE SERPL-MCNC: 5 MG/DL
PHOSPHATE SERPL-MCNC: 5.3 MG/DL
PLATELET # BLD AUTO: 226 K/UL
PLATELET # BLD AUTO: 237 K/UL
PLATELET BLD QL SMEAR: ABNORMAL
PMV BLD AUTO: ABNORMAL FL
PMV BLD AUTO: ABNORMAL FL
POTASSIUM SERPL-SCNC: 4.4 MMOL/L
POTASSIUM SERPL-SCNC: 4.6 MMOL/L
POTASSIUM SERPL-SCNC: 4.7 MMOL/L
POTASSIUM SERPL-SCNC: 5 MMOL/L
POTASSIUM SERPL-SCNC: 5.2 MMOL/L
POTASSIUM SERPL-SCNC: 5.8 MMOL/L
PREALB SERPL-MCNC: 14 MG/DL
PROT SERPL-MCNC: 8.1 G/DL
PROT SERPL-MCNC: 8.8 G/DL
PROT UR QL STRIP: NEGATIVE
RBC # BLD AUTO: 5.25 M/UL
RBC # BLD AUTO: 5.41 M/UL
SODIUM SERPL-SCNC: 167 MMOL/L
SODIUM SERPL-SCNC: 170 MMOL/L
SODIUM SERPL-SCNC: 172 MMOL/L
SODIUM SERPL-SCNC: 172 MMOL/L
SODIUM SERPL-SCNC: 174 MMOL/L
SP GR UR STRIP: 1.01 (ref 1–1.03)
SQUAMOUS #/AREA URNS AUTO: 20 /HPF
TROPONIN I SERPL DL<=0.01 NG/ML-MCNC: 0.15 NG/ML
TROPONIN I SERPL DL<=0.01 NG/ML-MCNC: 0.15 NG/ML
TSH SERPL DL<=0.005 MIU/L-ACNC: 1 UIU/ML
TSH SERPL DL<=0.005 MIU/L-ACNC: 1.1 UIU/ML
URN SPEC COLLECT METH UR: ABNORMAL
WBC # BLD AUTO: 10.13 K/UL
WBC # BLD AUTO: 11.26 K/UL
WBC #/AREA URNS AUTO: 7 /HPF (ref 0–5)

## 2018-12-23 PROCEDURE — 84134 ASSAY OF PREALBUMIN: CPT

## 2018-12-23 PROCEDURE — 25000003 PHARM REV CODE 250: Performed by: STUDENT IN AN ORGANIZED HEALTH CARE EDUCATION/TRAINING PROGRAM

## 2018-12-23 PROCEDURE — 99223 1ST HOSP IP/OBS HIGH 75: CPT | Mod: ,,, | Performed by: INTERNAL MEDICINE

## 2018-12-23 PROCEDURE — 84484 ASSAY OF TROPONIN QUANT: CPT

## 2018-12-23 PROCEDURE — 84100 ASSAY OF PHOSPHORUS: CPT | Mod: 91

## 2018-12-23 PROCEDURE — 84446 ASSAY OF VITAMIN E: CPT

## 2018-12-23 PROCEDURE — 20000000 HC ICU ROOM

## 2018-12-23 PROCEDURE — 84597 ASSAY OF VITAMIN K: CPT

## 2018-12-23 PROCEDURE — 83690 ASSAY OF LIPASE: CPT

## 2018-12-23 PROCEDURE — 82180 ASSAY OF ASCORBIC ACID: CPT

## 2018-12-23 PROCEDURE — 84590 ASSAY OF VITAMIN A: CPT

## 2018-12-23 PROCEDURE — 96360 HYDRATION IV INFUSION INIT: CPT

## 2018-12-23 PROCEDURE — 63600175 PHARM REV CODE 636 W HCPCS: Performed by: STUDENT IN AN ORGANIZED HEALTH CARE EDUCATION/TRAINING PROGRAM

## 2018-12-23 PROCEDURE — 84443 ASSAY THYROID STIM HORMONE: CPT

## 2018-12-23 PROCEDURE — 80048 BASIC METABOLIC PNL TOTAL CA: CPT | Mod: 91

## 2018-12-23 PROCEDURE — 83605 ASSAY OF LACTIC ACID: CPT | Mod: 91

## 2018-12-23 PROCEDURE — 96361 HYDRATE IV INFUSION ADD-ON: CPT

## 2018-12-23 PROCEDURE — 83735 ASSAY OF MAGNESIUM: CPT

## 2018-12-23 PROCEDURE — 83735 ASSAY OF MAGNESIUM: CPT | Mod: 91

## 2018-12-23 PROCEDURE — 99223 PR INITIAL HOSPITAL CARE,LEVL III: ICD-10-PCS | Mod: ,,, | Performed by: INTERNAL MEDICINE

## 2018-12-23 PROCEDURE — 83605 ASSAY OF LACTIC ACID: CPT

## 2018-12-23 PROCEDURE — 51701 INSERT BLADDER CATHETER: CPT

## 2018-12-23 PROCEDURE — 43752 NASAL/OROGASTRIC W/TUBE PLMT: CPT

## 2018-12-23 PROCEDURE — 84425 ASSAY OF VITAMIN B-1: CPT

## 2018-12-23 PROCEDURE — S5010 5% DEXTROSE AND 0.45% SALINE: HCPCS | Performed by: STUDENT IN AN ORGANIZED HEALTH CARE EDUCATION/TRAINING PROGRAM

## 2018-12-23 PROCEDURE — 82746 ASSAY OF FOLIC ACID SERUM: CPT

## 2018-12-23 PROCEDURE — 81001 URINALYSIS AUTO W/SCOPE: CPT

## 2018-12-23 PROCEDURE — 80053 COMPREHEN METABOLIC PANEL: CPT

## 2018-12-23 PROCEDURE — 51702 INSERT TEMP BLADDER CATH: CPT

## 2018-12-23 PROCEDURE — 25000003 PHARM REV CODE 250: Performed by: EMERGENCY MEDICINE

## 2018-12-23 PROCEDURE — 82306 VITAMIN D 25 HYDROXY: CPT

## 2018-12-23 PROCEDURE — 85025 COMPLETE CBC W/AUTO DIFF WBC: CPT

## 2018-12-23 PROCEDURE — 36415 COLL VENOUS BLD VENIPUNCTURE: CPT

## 2018-12-23 RX ORDER — AMLODIPINE BESYLATE 5 MG/1
5 TABLET ORAL DAILY
Status: DISCONTINUED | OUTPATIENT
Start: 2018-12-23 | End: 2018-12-24

## 2018-12-23 RX ORDER — HEPARIN SODIUM 5000 [USP'U]/ML
5000 INJECTION, SOLUTION INTRAVENOUS; SUBCUTANEOUS EVERY 8 HOURS
Status: DISCONTINUED | OUTPATIENT
Start: 2018-12-23 | End: 2019-01-09 | Stop reason: HOSPADM

## 2018-12-23 RX ORDER — CEFTRIAXONE 1 G/1
1 INJECTION, POWDER, FOR SOLUTION INTRAMUSCULAR; INTRAVENOUS
Status: DISCONTINUED | OUTPATIENT
Start: 2018-12-23 | End: 2018-12-24

## 2018-12-23 RX ORDER — SODIUM CHLORIDE 0.9 % (FLUSH) 0.9 %
3 SYRINGE (ML) INJECTION
Status: DISCONTINUED | OUTPATIENT
Start: 2018-12-23 | End: 2019-01-09 | Stop reason: HOSPADM

## 2018-12-23 RX ORDER — DEXTROSE MONOHYDRATE AND SODIUM CHLORIDE 5; .45 G/100ML; G/100ML
INJECTION, SOLUTION INTRAVENOUS CONTINUOUS
Status: DISCONTINUED | OUTPATIENT
Start: 2018-12-23 | End: 2018-12-24

## 2018-12-23 RX ORDER — FERROUS SULFATE, DRIED 160(50) MG
1 TABLET, EXTENDED RELEASE ORAL ONCE
Status: DISCONTINUED | OUTPATIENT
Start: 2018-12-23 | End: 2019-01-09 | Stop reason: HOSPADM

## 2018-12-23 RX ORDER — DEXTROSE MONOHYDRATE AND SODIUM CHLORIDE 5; .45 G/100ML; G/100ML
INJECTION, SOLUTION INTRAVENOUS CONTINUOUS
Status: ACTIVE | OUTPATIENT
Start: 2018-12-23 | End: 2018-12-23

## 2018-12-23 RX ORDER — FAMOTIDINE 20 MG/1
20 TABLET, FILM COATED ORAL 2 TIMES DAILY PRN
Status: DISCONTINUED | OUTPATIENT
Start: 2018-12-23 | End: 2018-12-23

## 2018-12-23 RX ORDER — DEXTROSE MONOHYDRATE AND SODIUM CHLORIDE 5; .45 G/100ML; G/100ML
INJECTION, SOLUTION INTRAVENOUS CONTINUOUS
Status: DISCONTINUED | OUTPATIENT
Start: 2018-12-23 | End: 2018-12-23

## 2018-12-23 RX ORDER — TRAZODONE HYDROCHLORIDE 50 MG/1
50 TABLET ORAL NIGHTLY
Status: DISCONTINUED | OUTPATIENT
Start: 2018-12-23 | End: 2019-01-08

## 2018-12-23 RX ADMIN — HEPARIN SODIUM 5000 UNITS: 5000 INJECTION, SOLUTION INTRAVENOUS; SUBCUTANEOUS at 06:12

## 2018-12-23 RX ADMIN — DEXTROSE AND SODIUM CHLORIDE: 5; .45 INJECTION, SOLUTION INTRAVENOUS at 06:12

## 2018-12-23 RX ADMIN — HEPARIN SODIUM 5000 UNITS: 5000 INJECTION, SOLUTION INTRAVENOUS; SUBCUTANEOUS at 09:12

## 2018-12-23 RX ADMIN — FOLIC ACID 1 MG: 5 INJECTION, SOLUTION INTRAMUSCULAR; INTRAVENOUS; SUBCUTANEOUS at 02:12

## 2018-12-23 RX ADMIN — DEXTROSE AND SODIUM CHLORIDE: 5; .45 INJECTION, SOLUTION INTRAVENOUS at 07:12

## 2018-12-23 RX ADMIN — THIAMINE HYDROCHLORIDE 100 MG: 100 INJECTION, SOLUTION INTRAMUSCULAR; INTRAVENOUS at 02:12

## 2018-12-23 RX ADMIN — CEFTRIAXONE SODIUM 1 G: 1 INJECTION, POWDER, FOR SOLUTION INTRAMUSCULAR; INTRAVENOUS at 07:12

## 2018-12-23 RX ADMIN — SODIUM CHLORIDE 1000 ML: 0.9 INJECTION, SOLUTION INTRAVENOUS at 02:12

## 2018-12-23 NOTE — ASSESSMENT & PLAN NOTE
afebrile, non elevated WBC, no UTI on UA   -most likely 2/2 hypernatremia & worsening dementia  -would consider depression as well

## 2018-12-23 NOTE — PLAN OF CARE
Problem: Adult Inpatient Plan of Care  Goal: Plan of Care Review  Outcome: Ongoing (interventions implemented as appropriate)  Patient oriented to self only. Plan of care and all safety precautions maintained and discussed with patient and daughter-in-law. Critical sodium. No acute events throughout the shift. Will continue to monitor.
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
## 2018-12-23 NOTE — ASSESSMENT & PLAN NOTE
-patient's only son passed away in 08/2018  -POA is daughter in law   -have initiated GOC discussion upon admission but will follow up in more detail once we have corrected her hypernatremia

## 2018-12-23 NOTE — HPI
Ms. Delgado is an 89yo woman with PMH of CKD, HTN, pulmonary HTN, former smoker, dementia, and Fe deficiency anemia who is brought in by her daughter in law with complaints of 1-2 weeks onset of altered mental status.     ICU was consulted for severe hydration & Na level found to be 174.     Daughter in law states that in the past week, patient has been more confused than baseline where she is more bed bound and unable to carry out her ADLs independently. She has been also staying up all night. Patient has had decrease in appetite & poor oral intake as well. Family has spoon fed her half a bowl of oatmeal & broccoli cheddar soup earlier. Daughter in law has noted dysphagia occurring over the past week where the patient will have difficulty swallowing and spit up a little bit. No vomiting. When asked about odynophagia she is unsure but states patient will sometimes wince in pain. Patient has also has not had a BM and oliguria producing small amount of urine in her diaper 1-2x yesterday.  She has also had 1 day of dry cough, no noticed SOB or chest pain.     At baseline, Ms. Delgado is able to prepare her own meals, ambulate, shower, etc. Daughter in law noticed patient's dementia initially started 2 years ago, but patient's son had been in denial and it was not worked up. Patient's son  four months ago and family noticed a decline in her mental status since then.     Of note, a week ago, patient had a fall trying to get out of bed and was seen in [place]. They report that imaging was done of her hip which showed no fracture. She did not hit her head or have LOC.   Per chart review, it seems as though family has been in search for a nursing home placement.

## 2018-12-23 NOTE — ED TRIAGE NOTES
Cesilia Delgado, a 88 y.o. female presents to the ED     Triage note:  Chief Complaint   Patient presents with    Constipation     Pt family reports pt lack of appitie. Family states they fed her soup today which she was able to keep down but pt not wanting to chew any food or drink much water. Family states pts last bm unknown.      Altered Mental Status     Family reports two weeks ago pt was walking independently and was able to hold a conversation. Pt was oriented as of last week according to family. Pt currenlty disoriented x4.          Family states that pt had sudden decline after she fell 2 weeks ago. She was seen at [facility]. They completed imaging on hips but did not obtain CT head scan at that time. She is only oriented to person. They state that before the fall patient was able to walk on her own and was independent and now she needs total assistance.     Review of patient's allergies indicates:  No Known Allergies  Past Medical History:   Diagnosis Date    Anemia     Asthma     CKD (chronic kidney disease), stage III     Dementia     Hypertension 5/28/2013    Pelvic mass in female     Pulmonary hypertension

## 2018-12-23 NOTE — CONSULTS
Patient admitted to MICU. Full H&P to follow.     Keri Bangura MD  Internal Medicine, PGY-I  CCS1 [employer] #[id number]  [facility]

## 2018-12-23 NOTE — ASSESSMENT & PLAN NOTE
Na of 174 upon admission  -1 NS bolus in ED  -serial BMPs, Mg & Ph q2hr   -free water deficit of 4L   -will plan to replace with 1/2 NS at 100cc/hr for 20 hours for goal decrease in Na of 10meq/ day  -slowly to prevent cerebral edema  -patient has not had vomiting, diarrhea, not on diuretics   -suspect 2/2 dehydration from advanced dementia

## 2018-12-23 NOTE — ASSESSMENT & PLAN NOTE
-She is oliguric  -suspect ATN in the setting of poor oral intake & severe dehydration  -will continue to monitor for improvement w/ fluid challenges

## 2018-12-23 NOTE — ED PROVIDER NOTES
Encounter Date: 2018    SCRIBE #1 NOTE: I, Jo Ann Nayan, am scribing for, and in the presence of,  Usha Steward MD. I have scribed the entire note.       History     Chief Complaint   Patient presents with    Constipation     Pt family reports pt lack of appitie. Family states they fed her soup today which she was able to keep down but pt not wanting to chew any food or drink much water. Family states pts last bm unknown.      Altered Mental Status     Family reports two weeks ago pt was walking independently and was able to hold a conversation. Pt was oriented as of last week according to family. Pt currenlty disoriented x4.      The patient is a 88 y.o. female with co-morbidities including: anemia, CKD, HTN, Pulmonary HTN, dementia who presents to the ED with a complaint of AMS. Family states over the past few weeks, patient has been more confused than baseline. They also states that a week ago, patient had a fall trying to get out of bed and was seen in [place]. They report that imaging was done of her hip which showed no fracture. However, no head imaging was done. Endorses increase in fatigue, decrease urine output, change in appetite. Unknown last bowel movement. Daughter states patient's son  four months ago and noticed a decline in her mental status then.       The history is provided by a relative. The history is limited by the condition of the patient.     Review of patient's allergies indicates:  No Known Allergies  Past Medical History:   Diagnosis Date    Anemia     Asthma     CKD (chronic kidney disease), stage III     Dementia     Hypertension 2013    Pelvic mass in female     Pulmonary hypertension      Past Surgical History:   Procedure Laterality Date    ESOPHAGOGASTRODUODENOSCOPY      ESOPHAGOGASTRODUODENOSCOPY (EGD) N/A 2016    Performed by Eitan Col[facility] at [facility] (2ND FLR)    HIP SURGERY Right      Family History   Problem Relation Age of  Onset    Hypertension Mother     Cancer Sister         breast    Diabetes Sister     Heart disease Sister     Diabetes Son     Colon polyps Son     Celiac disease Neg Hx     Colon cancer Neg Hx     Cystic fibrosis Neg Hx     Esophageal cancer Neg Hx     Hemochromatosis Neg Hx     Inflammatory bowel disease Neg Hx     Liver disease Neg Hx     Stomach cancer Neg Hx     Wally's disease Neg Hx      Social History     Tobacco Use    Smoking status: Former Smoker     Start date: 7/1/2007    Smokeless tobacco: Never Used   Substance Use Topics    Alcohol use: No     Alcohol/week: 0.0 oz    Drug use: No     Review of Systems   Unable to perform ROS: Mental status change       Physical Exam     Initial Vitals [12/22/18 2217]   BP Pulse Resp Temp SpO2   (!) 167/106 86 14 97.9 °F (36.6 °C) 98 %      MAP       --         Physical Exam    Nursing note and vitals reviewed.  Constitutional: Vital signs are normal. She appears cachectic.   Ill appearing   HENT:   Head: Normocephalic and atraumatic.   Mouth/Throat: Oropharynx is clear and moist.   Dry mucus membrane   Eyes: Conjunctivae and EOM are normal. Pupils are equal, round, and reactive to light.   Neck: Trachea normal and normal range of motion. Neck supple.   Cardiovascular: Normal rate, regular rhythm, normal heart sounds and normal pulses.   Pulmonary/Chest: Breath sounds normal. No respiratory distress.   Abdominal: Soft. Normal appearance and bowel sounds are normal. There is no tenderness.   Musculoskeletal: Normal range of motion.   Neurological: She is alert.   No focal neuro deficit  Oriented to name   Pleasantly demented   Fluid speech    Skin: Skin is warm and dry.         ED Course   Procedures  Labs Reviewed   CBC W/ AUTO DIFFERENTIAL - Abnormal; Notable for the following components:       Result Value    RBC 5.41 (*)     MCV 77 (*)     MCH 24.2 (*)     MCHC 31.3 (*)     RDW 19.9 (*)     Gran # (ANC) 8.6 (*)     nRBC 1 (*)     Gran% 76.6 (*)      Lymph% 17.5 (*)     All other components within normal limits   COMPREHENSIVE METABOLIC PANEL - Abnormal; Notable for the following components:    Sodium 172 (*)     Potassium 5.8 (*)     Chloride >130 (*)     CO2 19 (*)     Glucose 117 (*)     BUN, Bld 118 (*)     Creatinine 5.6 (*)     Calcium 11.0 (*)     Total Protein 8.8 (*)     ALT 9 (*)     eGFR if  7.2 (*)     eGFR if non  6.3 (*)     All other components within normal limits   LACTIC ACID, PLASMA - Abnormal; Notable for the following components:    Lactate (Lactic Acid) 2.3 (*)     All other components within normal limits   LIPASE - Abnormal; Notable for the following components:    Lipase 212 (*)     All other components within normal limits   MAGNESIUM - Abnormal; Notable for the following components:    Magnesium 3.9 (*)     All other components within normal limits   TROPONIN I - Abnormal; Notable for the following components:    Troponin I 0.154 (*)     All other components within normal limits   URINALYSIS, REFLEX TO URINE CULTURE - Abnormal; Notable for the following components:    Appearance, UA Hazy (*)     Leukocytes, UA Trace (*)     All other components within normal limits    Narrative:     Preferred Collection Type->Urine, Clean Catch white top   URINALYSIS MICROSCOPIC - Abnormal; Notable for the following components:    Hyaline Casts, UA 4 (*)     All other components within normal limits    Narrative:     Preferred Collection Type->Urine, Clean Catch white top   CBC W/ AUTO DIFFERENTIAL - Abnormal; Notable for the following components:    MCV 76 (*)     MCH 24.4 (*)     RDW 19.9 (*)     Gran # (ANC) 8.7 (*)     Lymph # 0.9 (*)     nRBC 1 (*)     Gran% 86.1 (*)     Lymph% 8.5 (*)     All other components within normal limits   COMPREHENSIVE METABOLIC PANEL - Abnormal; Notable for the following components:    Sodium 174 (*)     Chloride >130 (*)     CO2 20 (*)     BUN, Bld 112 (*)     Creatinine 5.6 (*)      Calcium 11.0 (*)     ALT 7 (*)     eGFR if  7.2 (*)     eGFR if non  6.3 (*)     All other components within normal limits   LIPASE - Abnormal; Notable for the following components:    Lipase 198 (*)     All other components within normal limits   MAGNESIUM - Abnormal; Notable for the following components:    Magnesium 3.4 (*)     All other components within normal limits   TROPONIN I - Abnormal; Notable for the following components:    Troponin I 0.154 (*)     All other components within normal limits   TSH   LACTIC ACID, PLASMA   TSH     EKG Readings: (Independently Interpreted)   Initial Reading: No STEMI. Previous EKG: Compared with most recent EKG Previous EKG Date: 6/2016. Rhythm: Normal Sinus Rhythm. Heart Rate: 80. Ectopy: No Ectopy. ST Segment Depression: V3, V4, V5 and V6.       Imaging Results          X-Ray Abdomen Flat And Erect (Final result)  Result time 12/23/18 00:15:32    Final result by Zay Hernadez MD (12/23/18 00:15:32)                 Impression:      Nonobstructed bowel-gas pattern.      Electronically signed by: Zay Hernadez MD  Date:    12/23/2018  Time:    00:15             Narrative:    EXAMINATION:  XR ABDOMEN FLAT AND ERECT    CLINICAL HISTORY:  Constipation, unspecified    TECHNIQUE:  Flat and erect AP views of the abdomen were preformed.    COMPARISON:  None    FINDINGS:  Costochondral calcifications project over the upper abdomen.  Bowel gas pattern is non-obstructive.  No evidence for pneumoperitoneum.  Partially visualized fixation hardware at the right hip.                               CT Head Without Contrast (Final result)  Result time 12/23/18 00:26:39    Final result by Zay Hernadez MD (12/23/18 00:26:39)                 Impression:      1. No evidence of acute intracranial hemorrhage or major vascular distribution infarct.  2. Remote right occipital infarct.  Remote lacunar infarct right basal ganglia.  3. Chronic microvascular  ischemic change.  4. Generalized cerebral volume loss.    Electronically signed by resident: Shaun Juan  Date:    12/22/2018  Time:    23:59    Electronically signed by: Zay Hernadez MD  Date:    12/23/2018  Time:    00:26             Narrative:    EXAMINATION:  CT HEAD WITHOUT CONTRAST    CLINICAL HISTORY:  Confusion/delirium, altered LOC, unexplained    TECHNIQUE:  Low dose axial images were obtained through the head.  Coronal and sagittal reformations were also performed. Contrast was not administered.    COMPARISON:  MRI brain 06/28/2016, CT head 06/27/2016    FINDINGS:  Age-appropriate generalized cerebral volume loss with compensatory widening of the sulci and ventricular system.  No evidence of hydrocephalus.  No extra-axial blood or fluid collections.    Encephalomalacia of the right paramedian occipital lobe compatible with remote infarct.  Remote lacunar infarct right basal ganglia, unchanged.  Patchy and confluent hypoattenuation of the supratentorial white matter consistent with chronic microvascular ischemic change.    No evidence of acute intracranial hemorrhage or major vascular distribution infarct.  No parenchymal mass or edema.    No calvarial fracture.  Mastoid air cells and paranasal sinuses are clear.                              X-Rays:   Independently Interpreted Readings:   Head CT: No hemorrhage.  No skull fracture.  No acute stroke.   Abdomen:   Flat and Erect of Abdomen - Nonspecific bowel gas.  No free air under diaphragm.  No air fluid levels or signs of obstruction.     Medical Decision Making:   History:   I obtained history from: someone other than patient.  Old Medical Records: I decided to obtain old medical records.  Independently Interpreted Test(s):   I have ordered and independently interpreted X-rays - see prior notes.  Clinical Tests:   Lab Tests: Ordered and Reviewed  Radiological Study: Ordered and Reviewed  Medical Tests: Ordered and Reviewed  ED Management:  Emergent  evaluation of worsening AMS and decrease oral intake. Patient family is not feeling like they can care for her at home and is making arrangement for home care. Will evaluate for acute cranial process that might contribute to worsening mental status. Will check labs and start fluid resuscitation.   Other:   I have discussed this case with another health care provider.            Scribe Attestation:   Scribe #1: I performed the above scribed service and the documentation accurately describes the services I performed. I attest to the accuracy of the note.    Attending Attestation:         Attending Critical Care:   Critical Care Times:   Direct Patient Care (initial evaluation, reassessments, and time considering the case)................................................................30 minutes.   Additional History from reviewing old medical records or taking additional history from the family, EMS, PCP, etc.......................5 minutes.   Ordering, Reviewing, and Interpreting Diagnostic Studies...............................................................................................................5 minutes.   Documentation..................................................................................................................................................................................5 minutes.   Consultation with other Physicians. .................................................................................................................................................10 minutes.   Consultation with the patient's family directly relating to the patient's condition, care, and DNR status (when patient unable)......5 minutes.   ==============================================================  · Total Critical Care Time - exclusive of procedural time: 60 minutes.  ==============================================================  Critical care was necessary to treat or prevent imminent or  life-threatening deterioration of the following conditions: renal failure and metabolic crisis.       Attending ED Notes:   2:46 AM  Lab works reveals significant hypernatremia and ODILIA which might be the source of patient's worsening AMS over the past week. ICU consulted for admission.     4:31 AM  Patient admitted to ICU             Clinical Impression:   The primary encounter diagnosis was Hypernatremia. Diagnoses of Altered mental status and Constipation were also pertinent to this visit.      Disposition:   Disposition: Admitted  Condition: Stable                        Usha Steward MD  12/1935
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
## 2018-12-23 NOTE — ED NOTES
I called lab and they state that they never received blood work. Tube system was down at the time. I notified Dr. Steward and susu bloodwork. [facility] is aware of delay.

## 2018-12-23 NOTE — PT/OT/SLP PROGRESS
Speech Language Pathology      Cesilia Delgado  MRN: [id number]    SLP Clinical Swallow Evaluation orders received. SLP attempted to see Patient; however, Patient somnolent.  Findings reviewed with MD team. Patient not seen today secondary to Patient fatigue. Will follow-up to re-attempt 12/24/2018. Thank you.    TOMAS Ramirez., [facility]-SLP  Speech-Language Pathology  Pager: [phone]  12/23/2018

## 2018-12-24 LAB
ALBUMIN SERPL BCP-MCNC: 2.8 G/DL
ALBUMIN SERPL BCP-MCNC: 2.8 G/DL
ALBUMIN SERPL BCP-MCNC: 2.9 G/DL
ALBUMIN SERPL BCP-MCNC: 2.9 G/DL
ALBUMIN SERPL BCP-MCNC: 3 G/DL
ALBUMIN SERPL BCP-MCNC: 3.1 G/DL
ANION GAP SERPL CALC-SCNC: ABNORMAL MMOL/L
BASOPHILS # BLD AUTO: 0.04 K/UL
BASOPHILS NFR BLD: 0.4 %
BUN SERPL-MCNC: 64 MG/DL
BUN SERPL-MCNC: 68 MG/DL
BUN SERPL-MCNC: 70 MG/DL
BUN SERPL-MCNC: 72 MG/DL
BUN SERPL-MCNC: 84 MG/DL
BUN SERPL-MCNC: 90 MG/DL
CALCIUM SERPL-MCNC: 8.7 MG/DL
CALCIUM SERPL-MCNC: 9.1 MG/DL
CALCIUM SERPL-MCNC: 9.3 MG/DL
CALCIUM SERPL-MCNC: 9.4 MG/DL
CALCIUM SERPL-MCNC: 9.4 MG/DL
CALCIUM SERPL-MCNC: 9.5 MG/DL
CHLORIDE SERPL-SCNC: >130 MMOL/L
CO2 SERPL-SCNC: 14 MMOL/L
CO2 SERPL-SCNC: 16 MMOL/L
CO2 SERPL-SCNC: 16 MMOL/L
CO2 SERPL-SCNC: 17 MMOL/L
CO2 SERPL-SCNC: 18 MMOL/L
CO2 SERPL-SCNC: 19 MMOL/L
CREAT SERPL-MCNC: 2.9 MG/DL
CREAT SERPL-MCNC: 3.1 MG/DL
CREAT SERPL-MCNC: 3.5 MG/DL
CREAT SERPL-MCNC: 3.9 MG/DL
DIFFERENTIAL METHOD: ABNORMAL
EOSINOPHIL # BLD AUTO: 0.2 K/UL
EOSINOPHIL NFR BLD: 2 %
ERYTHROCYTE [DISTWIDTH] IN BLOOD BY AUTOMATED COUNT: 19.2 %
EST. GFR  (AFRICAN AMERICAN): 11.2 ML/MIN/1.73 M^2
EST. GFR  (AFRICAN AMERICAN): 12.8 ML/MIN/1.73 M^2
EST. GFR  (AFRICAN AMERICAN): 14.8 ML/MIN/1.73 M^2
EST. GFR  (AFRICAN AMERICAN): 16 ML/MIN/1.73 M^2
EST. GFR  (NON AFRICAN AMERICAN): 11.1 ML/MIN/1.73 M^2
EST. GFR  (NON AFRICAN AMERICAN): 12.8 ML/MIN/1.73 M^2
EST. GFR  (NON AFRICAN AMERICAN): 13.9 ML/MIN/1.73 M^2
EST. GFR  (NON AFRICAN AMERICAN): 9.7 ML/MIN/1.73 M^2
GLUCOSE SERPL-MCNC: 111 MG/DL
GLUCOSE SERPL-MCNC: 113 MG/DL
GLUCOSE SERPL-MCNC: 137 MG/DL
GLUCOSE SERPL-MCNC: 174 MG/DL
GLUCOSE SERPL-MCNC: 196 MG/DL
GLUCOSE SERPL-MCNC: 98 MG/DL
HCT VFR BLD AUTO: 33.7 %
HGB BLD-MCNC: 10.8 G/DL
IMM GRANULOCYTES # BLD AUTO: 0.03 K/UL
IMM GRANULOCYTES NFR BLD AUTO: 0.3 %
LYMPHOCYTES # BLD AUTO: 1 K/UL
LYMPHOCYTES NFR BLD: 9 %
MAGNESIUM SERPL-MCNC: 2 MG/DL
MCH RBC QN AUTO: 24.9 PG
MCHC RBC AUTO-ENTMCNC: 32 G/DL
MCV RBC AUTO: 78 FL
MONOCYTES # BLD AUTO: 0.7 K/UL
MONOCYTES NFR BLD: 6.1 %
NEUTROPHILS # BLD AUTO: 8.8 K/UL
NEUTROPHILS NFR BLD: 82.2 %
NRBC BLD-RTO: 1 /100 WBC
PHOSPHATE SERPL-MCNC: 2.5 MG/DL
PHOSPHATE SERPL-MCNC: 2.5 MG/DL
PHOSPHATE SERPL-MCNC: 2.6 MG/DL
PHOSPHATE SERPL-MCNC: 2.8 MG/DL
PHOSPHATE SERPL-MCNC: 2.9 MG/DL
PHOSPHATE SERPL-MCNC: 2.9 MG/DL
PHOSPHATE SERPL-MCNC: 3.3 MG/DL
PLATELET # BLD AUTO: 165 K/UL
PMV BLD AUTO: ABNORMAL FL
POTASSIUM SERPL-SCNC: 3.5 MMOL/L
POTASSIUM SERPL-SCNC: 3.6 MMOL/L
POTASSIUM SERPL-SCNC: 3.8 MMOL/L
POTASSIUM SERPL-SCNC: 4 MMOL/L
POTASSIUM SERPL-SCNC: 4.1 MMOL/L
POTASSIUM SERPL-SCNC: 4.4 MMOL/L
RBC # BLD AUTO: 4.34 M/UL
SODIUM SERPL-SCNC: 157 MMOL/L
SODIUM SERPL-SCNC: 158 MMOL/L
SODIUM SERPL-SCNC: 159 MMOL/L
SODIUM SERPL-SCNC: 160 MMOL/L
SODIUM SERPL-SCNC: 163 MMOL/L
SODIUM SERPL-SCNC: 165 MMOL/L
WBC # BLD AUTO: 10.67 K/UL

## 2018-12-24 PROCEDURE — 63600175 PHARM REV CODE 636 W HCPCS: Performed by: STUDENT IN AN ORGANIZED HEALTH CARE EDUCATION/TRAINING PROGRAM

## 2018-12-24 PROCEDURE — 80069 RENAL FUNCTION PANEL: CPT | Mod: 91

## 2018-12-24 PROCEDURE — 85025 COMPLETE CBC W/AUTO DIFF WBC: CPT

## 2018-12-24 PROCEDURE — 20000000 HC ICU ROOM

## 2018-12-24 PROCEDURE — 25000003 PHARM REV CODE 250: Performed by: NURSE PRACTITIONER

## 2018-12-24 PROCEDURE — 25000003 PHARM REV CODE 250: Performed by: STUDENT IN AN ORGANIZED HEALTH CARE EDUCATION/TRAINING PROGRAM

## 2018-12-24 PROCEDURE — G8997 SWALLOW GOAL STATUS: HCPCS | Mod: CK

## 2018-12-24 PROCEDURE — 94761 N-INVAS EAR/PLS OXIMETRY MLT: CPT

## 2018-12-24 PROCEDURE — 76937 US GUIDE VASCULAR ACCESS: CPT

## 2018-12-24 PROCEDURE — 80069 RENAL FUNCTION PANEL: CPT

## 2018-12-24 PROCEDURE — C1751 CATH, INF, PER/CENT/MIDLINE: HCPCS

## 2018-12-24 PROCEDURE — 36415 COLL VENOUS BLD VENIPUNCTURE: CPT

## 2018-12-24 PROCEDURE — 99233 PR SUBSEQUENT HOSPITAL CARE,LEVL III: ICD-10-PCS | Mod: ,,, | Performed by: INTERNAL MEDICINE

## 2018-12-24 PROCEDURE — 83735 ASSAY OF MAGNESIUM: CPT

## 2018-12-24 PROCEDURE — 36569 INSJ PICC 5 YR+ W/O IMAGING: CPT

## 2018-12-24 PROCEDURE — 99233 SBSQ HOSP IP/OBS HIGH 50: CPT | Mod: ,,, | Performed by: INTERNAL MEDICINE

## 2018-12-24 PROCEDURE — G8996 SWALLOW CURRENT STATUS: HCPCS | Mod: CM

## 2018-12-24 PROCEDURE — 92610 EVALUATE SWALLOWING FUNCTION: CPT

## 2018-12-24 PROCEDURE — S5010 5% DEXTROSE AND 0.45% SALINE: HCPCS | Performed by: STUDENT IN AN ORGANIZED HEALTH CARE EDUCATION/TRAINING PROGRAM

## 2018-12-24 RX ORDER — BISACODYL 10 MG
10 SUPPOSITORY, RECTAL RECTAL DAILY PRN
Status: DISCONTINUED | OUTPATIENT
Start: 2018-12-24 | End: 2019-01-09 | Stop reason: HOSPADM

## 2018-12-24 RX ORDER — AMLODIPINE BESYLATE 10 MG/1
10 TABLET ORAL DAILY
Status: DISCONTINUED | OUTPATIENT
Start: 2018-12-24 | End: 2019-01-08

## 2018-12-24 RX ORDER — LACTULOSE 10 G/15ML
20 SOLUTION ORAL ONCE
Status: COMPLETED | OUTPATIENT
Start: 2018-12-24 | End: 2018-12-24

## 2018-12-24 RX ORDER — SODIUM,POTASSIUM PHOSPHATES 280-250MG
1 POWDER IN PACKET (EA) ORAL ONCE
Status: COMPLETED | OUTPATIENT
Start: 2018-12-24 | End: 2018-12-24

## 2018-12-24 RX ORDER — HYDRALAZINE HYDROCHLORIDE 25 MG/1
25 TABLET, FILM COATED ORAL ONCE
Status: COMPLETED | OUTPATIENT
Start: 2018-12-24 | End: 2018-12-24

## 2018-12-24 RX ORDER — DEXTROSE MONOHYDRATE 50 MG/ML
INJECTION, SOLUTION INTRAVENOUS CONTINUOUS
Status: DISCONTINUED | OUTPATIENT
Start: 2018-12-24 | End: 2018-12-25

## 2018-12-24 RX ADMIN — HEPARIN SODIUM 5000 UNITS: 5000 INJECTION, SOLUTION INTRAVENOUS; SUBCUTANEOUS at 09:12

## 2018-12-24 RX ADMIN — DEXTROSE AND SODIUM CHLORIDE: 5; .45 INJECTION, SOLUTION INTRAVENOUS at 01:12

## 2018-12-24 RX ADMIN — POTASSIUM & SODIUM PHOSPHATES POWDER PACK 280-160-250 MG 1 PACKET: 280-160-250 PACK at 02:12

## 2018-12-24 RX ADMIN — HEPARIN SODIUM 5000 UNITS: 5000 INJECTION, SOLUTION INTRAVENOUS; SUBCUTANEOUS at 02:12

## 2018-12-24 RX ADMIN — FOLIC ACID 1 MG: 5 INJECTION, SOLUTION INTRAMUSCULAR; INTRAVENOUS; SUBCUTANEOUS at 10:12

## 2018-12-24 RX ADMIN — HEPARIN SODIUM 5000 UNITS: 5000 INJECTION, SOLUTION INTRAVENOUS; SUBCUTANEOUS at 05:12

## 2018-12-24 RX ADMIN — DEXTROSE: 5 SOLUTION INTRAVENOUS at 11:12

## 2018-12-24 RX ADMIN — LACTULOSE 20 G: 20 SOLUTION ORAL at 06:12

## 2018-12-24 RX ADMIN — THIAMINE HYDROCHLORIDE 100 MG: 100 INJECTION, SOLUTION INTRAMUSCULAR; INTRAVENOUS at 10:12

## 2018-12-24 RX ADMIN — HYDRALAZINE HYDROCHLORIDE 25 MG: 25 TABLET, FILM COATED ORAL at 04:12

## 2018-12-24 RX ADMIN — AMLODIPINE BESYLATE 10 MG: 10 TABLET ORAL at 09:12
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
## 2018-12-24 NOTE — SUBJECTIVE & OBJECTIVE
Interval History/Significant Events: NA and ODILIA improving   Review of Systems   Unable to perform ROS: Dementia     Objective:     Vital Signs (Most Recent):  Temp: 97.4 °F (36.3 °C) (12/24/18 1100)  Pulse: 65 (12/24/18 1300)  Resp: (!) 21 (12/24/18 1300)  BP: (!) 141/65 (12/24/18 1300)  SpO2: 100 % (12/24/18 1300) Vital Signs (24h Range):  Temp:  [97.2 °F (36.2 °C)-98 °F (36.7 °C)] 97.4 °F (36.3 °C)  Pulse:  [58-71] 65  Resp:  [14-34] 21  SpO2:  [91 %-100 %] 100 %  BP: (137-206)/() 141/65   Weight: 34.7 kg (76 lb 8 oz)  Body mass index is 13.99 kg/m².      Intake/Output Summary (Last 24 hours) at 12/24/2018 1322  Last data filed at 12/24/2018 1300  Gross per 24 hour   Intake 4433.33 ml   Output 2690 ml   Net 1743.33 ml       Physical Exam   Constitutional: No distress.   Severely frail & thin appearing elderly woman         HENT:   Head: Normocephalic and atraumatic.   Facial muscle waisting    Eyes: No scleral icterus.   Bilateral glaucoma   Neck: Normal range of motion. Neck supple. No JVD present. No thyromegaly present.   Cardiovascular: Normal rate, regular rhythm and normal heart sounds.   Pulmonary/Chest: Effort normal and breath sounds normal. No stridor. No respiratory distress. She has no wheezes. She has no rales.   Abdominal: Soft. Bowel sounds are normal. She exhibits no distension. There is no tenderness. There is no guarding.   Musculoskeletal: She exhibits no edema, tenderness or deformity.   L. Elbow healing bruise    Neurological: She is alert.   Oriented to self;  Follow most of commaneds ,Speaks in nonsensical phrases    Skin: She is not diaphoretic.   Psychiatric: She has a normal mood and affect.   Vitals reviewed.      Vents:     Lines/Drains/Airways     Drain                 NG/OG Tube 12/23/18 1500 nasogastric 16 Fr. Right nostril less than 1 day         Urethral Catheter 12/23/18 1404 16 Fr. less than 1 day          Peripheral Intravenous Line                 Peripheral IV - Single  Lumen 12/22/18 2301 Left Forearm 1 day         Peripheral IV - Single Lumen 12/23/18 1500 Right Antecubital less than 1 day              Significant Labs:    CBC/Anemia Profile:  Recent Labs   Lab 12/22/18  2301 12/23/18  0118 12/23/18  1234 12/24/18  0451   WBC 11.26 10.13  --  10.67   HGB 13.1 12.8  --  10.8*   HCT 41.9 39.9  --  33.7*    226  --  165   MCV 77* 76*  --  78*   RDW 19.9* 19.9*  --  19.2*   FOLATE  --   --  9.4  --         Chemistries:  Recent Labs   Lab 12/22/18  2301 12/23/18  0118  12/23/18  0635 12/23/18  1234  12/24/18  0451 12/24/18  0908 12/24/18  0934 12/24/18  1236   * 174*   < > 170* 170*  170*  170*   < > 163* 159*  --  160*   K 5.8* 5.0   < > 5.2* 4.4  4.4  4.4   < > 3.8 3.5  --  4.1   CL >130* >130*   < > >130* >130*  >130*  >130*   < > >130* >130*  --  >130*   CO2 19* 20*   < > 14* 18*  18*  18*   < > 17* 16*  --  18*   * 112*   < > 111* 108*  108*  108*   < > 84* 70*  --  72*   CREATININE 5.6* 5.6*   < > 4.9* 4.5*  4.5*  4.5*   < > 3.5* 3.1*  --  3.1*   CALCIUM 11.0* 11.0*   < > 10.3 9.8  9.8  9.8   < > 9.4 8.7  --  9.3   ALBUMIN 3.8 3.7  --   --   --    < > 3.0* 2.8*  --  2.9*   PROT 8.8* 8.1  --   --   --   --   --   --   --   --    BILITOT 0.8 0.8  --   --   --   --   --   --   --   --    ALKPHOS 66 63  --   --   --   --   --   --   --   --    ALT 9* 7*  --   --   --   --   --   --   --   --    AST 22 12  --   --   --   --   --   --   --   --    MG 3.9* 3.4*   < > 3.3* 3.0*  3.0*  3.0*  --   --   --  2.0  --    PHOS  --   --    < > 5.3* 4.3  4.3  4.3   < > 2.9 2.5*  2.5*  --  2.6*    < > = values in this interval not displayed.           Significant Imaging:  I have reviewed all pertinent imaging results/findings within the past 24 hours.

## 2018-12-24 NOTE — ASSESSMENT & PLAN NOTE
-daughter states that patient was conducting ADLs independently up to 2 weeks and had acute decline in health where she stayed in bed a lot with insomnia   -patient's son passed away 08/2018  -would consider psych assessment for depression induced withdrawal of ADLs or advancing dementia after fluid resuscitation

## 2018-12-24 NOTE — NURSING
Notified Dr. Adair regarding patients SBP >180 for the last 2 BP checks. x1 dose of hydralazine to be administered

## 2018-12-24 NOTE — PLAN OF CARE
Problem: SLP Goal  Goal: SLP Goal  Goals expected to be met by 12/31/18:  1. Pt will participate in BSS to determine least restrictive diet.-  MET 12/24/18  2. Pt will tolerate clear liquids PO diet with no overt s/s of aspiration.  3. Pt will participate in advanced diet trials with no overt s/s of aspiration to determine safest/least restrictive PO diet      Outcome: Ongoing (interventions implemented as appropriate)  12/24/18: Pt participated in clinical swallow eval this AM. Pt with limited participation in PO trials. See note for full details. Recs: Clear liquids PO diet, continue alternative means of nutrition/hydration via NG tube for supplemental nutrition, with ongoing swallow assessment. SLP will continue to follow.  TOMAS Alonso., CCC-SLP  Speech-Language Pathologist

## 2018-12-24 NOTE — ASSESSMENT & PLAN NOTE
-She is oliguric  -suspect ATN in the setting of poor oral intake & severe dehydration  -will continue to monitor for improvement w/ fluid challenges  -- improving

## 2018-12-24 NOTE — PLAN OF CARE
12/24/18 1242   Post-Acute Status   Post-Acute Authorization Placement   Post-Acute Placement Status Patient Evaluation by Facility   CM aware that patient's Daughter in law has been working to get patient admitted to [facility].  CM spoke with Kary in admission and will forward final report of CXR to [phone] as requested.  CM requested covering SW to initiate state paperwork for NHP.  CM will continue to follow.    Your fax has been successfully sent to [phone] at [phone].  ------------------------------------------------------------  From: etrosclair  ------------------------------------------------------------  12/24/2018 12:57:00 PM Transmission Record   Sent to [phone] with remote ID "   Result: (0/339;0/0) Success   Page record: 1 - 5   Elapsed time: 01:37 on channel 35      Conchis Tripathi RN, BSN, [facility]  Case Management  [facility]  Ext. [phone]

## 2018-12-24 NOTE — PT/OT/SLP EVAL
Speech Language Pathology Evaluation  Bedside Swallow    Patient Name:  Cesilia Delgado   MRN:  [id number]  Admitting Diagnosis: Hypernatremia    Recommendations:                 General Recommendations:  Dysphagia therapy-- ongoing swallow assessment   Diet recommendations:  NPO, Clears   Aspiration Precautions: 1 bite/sip at a time, Alternating bites/sips, Assistance with meals, Check for pocketing/oral residue, Continue alternate means of nutrition, Eliminate distractions, Feed only when awake/alert, Monitor for s/s of aspiration, Remain upright 30 minutes post meal, Small bites/sips and Standard aspiration precautions, continue with meds via NG tube  General Precautions: Standard, fall, aspiration  Communication strategies:  provide increased time to answer, go to room if call light pushed and pt will require frequent redirection to attend to given tasks    History:     Past Medical History:   Diagnosis Date    Anemia     Asthma     CKD (chronic kidney disease), stage III     Dementia     Hypertension 5/28/2013    Pelvic mass in female     Pulmonary hypertension        Past Surgical History:   Procedure Laterality Date    ESOPHAGOGASTRODUODENOSCOPY      ESOPHAGOGASTRODUODENOSCOPY (EGD) N/A 12/20/2016    Performed by Eitan Coles MD at [facility] ([address] [place] [address])    HIP SURGERY Right      HPI:  Ms. Delgado is an 87yo woman with PMH of CKD, HTN, pulmonary HTN, former smoker, dementia, and Fe deficiency anemia who is brought in by her daughter in law with complaints of 1-2 weeks onset of altered mental status.      ICU was consulted for severe hydration & Na level found to be 174.      Daughter in law states that in the past week, patient has been more confused than baseline where she is more bed bound and unable to carry out her ADLs independently. She has been also staying up all night. Patient has had decrease in appetite & poor oral intake as well. Family has spoon fed her half a bowl of oatmeal & broccoli  "cheddar soup earlier. Daughter in law has noted dysphagia occurring over the past week where the patient will have difficulty swallowing and spit up a little bit. No vomiting. When asked about odynophagia she is unsure but states patient will sometimes wince in pain. Patient has also has not had a BM and oliguria producing small amount of urine in her diaper 1-2x yesterday.  She has also had 1 day of dry cough, no noticed SOB or chest pain. No fevers, night sweats.      At baseline, Ms. Delgado is able to prepare her own meals, ambulate, shower, etc. Daughter in law noticed patient's dementia initially started 2 years ago, but patient's son had been in denial and it was not worked up. Patient's son  four months ago and family noticed a decline in her mental status since then.      Of note, a week ago, patient had a fall trying to get out of bed and was seen in [place]. They report that imaging was done of her hip which showed no fracture. She did not hit her head or have LOC.   Per chart review, it seems as though family has been in search for a nursing home placement.       Social History: Patient lives with her daughter-in-lawe.    Prior Intubation HX:  N/A    Modified Barium Swallow: None on file    Chest X-Rays: None on file    Prior diet: Unknown at this time, 2/2 pt's limited participation/confusion.      Subjective     SLP consulted for clinical swallow eval. SLP confirmed with RN prior to entry. Pt found in bed awake, but observed to be confused and with waxing and waning SRINIVASA, requiring mod-max redirection from SLP throughout session to participate in given PO trials.    Patient goals: "Not right now honey, I'm full" per pt     Pain/Comfort:  · Pain Rating 1: 0/10    Objective:     Oral Musculature Evaluation  · Oral Musculature: unable to assess due to poor participation/comprehension  · Dentition: edentulous  · Mucosal Quality: adequate  · Mandibular Strength and Mobility: (unable to assess 2/2 pt's " limited participation)  · Oral Labial Strength and Mobility: WFL  · Lingual Strength and Mobility: (reduced strength)  · Buccal Strength and Mobility: (unable to assess 2/2 pt's limited participation)  · Volitional Swallow: (unable to assess 2/2 pt's limited participation)    Bedside Swallow Eval: Pt participated in clinical swallow eval this AM. Pt with limited participation in PO trials and required mod-max cuing from SLP to participate and initiate trigger of swallow during given PO trials.   Consistencies Assessed:  · Thin liquids -via tsp x2, cup sips x1, straw x5  · Puree -(pudding) x1     Oral Phase:   Across all PO trials  · Decreased closure around utensil  · Poor oral acceptance  · Slow oral transit time--prolonged oral manipulation     Pharyngeal Phase:   Across all PO trials  · decreased hyolaryngeal excursion to palpation  · delayed swallow initation  · multiple spontaneous swallows    Compensatory Strategies  · Multiple swallows    Treatment/Education: SLP will f/u to ensure safety with rec'd diet and pt will participate in ongoing swallow assessment to determine safest/least restrictive PO diet x3/wk.  SLP educated pt on role of SLP, clinical swallow eval, diet recs/swallow precautions and POC. Pt acknowledged, however, pt demonstrated no evidence of learning and will require frequent reinforcement of education provided.         Assessment:     Cesilia Delgado is a 88 y.o. female admitted with Hypernatremia.  She presents oropharyngeal dysphagia with limited participation in PO trials. However, pt demonstrated no overt s/s of aspiration at bedside across limited PO trials, per subjective observation.   Recs: Clear liquids PO diet, continue alternative means of nutrition/hydration via NG tube for supplemental nutrition, with ongoing swallow assessment. SLP will continue to follow. SLP notified KEITH Nicholas and MD team of results/recs.    Goals:   Multidisciplinary Problems     SLP Goals        Problem: SLP  Goal    Goal Priority Disciplines Outcome   SLP Goal     SLP Ongoing (interventions implemented as appropriate)   Description:  Goals expected to be met by 12/31/18:  1. Pt will participate in BSS to determine least restrictive diet.-  MET 12/24/18  2. Pt will tolerate clear liquids PO diet with no overt s/s of aspiration.  3. Pt will participate in advanced diet trials with no overt s/s of aspiration to determine safest/least restrictive PO diet                        Plan:     · Patient to be seen:  3 x/week   · Plan of Care expires:  01/23/19  · Plan of Care reviewed with:  patient(KEITH Nicholas and MD team)   · SLP Follow-Up:  Yes       Discharge recommendations:  (TBD pending pt's progress)     Time Tracking:     SLP Treatment Date:   12/24/18  Speech Start Time:  0757  Speech Stop Time:  0807     Speech Total Time (min):  10 min    Billable Minutes: Eval Swallow and Oral Function 10    KIA Alonso, CRISSY-SLP  12/24/2018

## 2018-12-24 NOTE — ASSESSMENT & PLAN NOTE
Na of 174 upon admission  -1 NS bolus in ED  -serial BMPs, Mg & Ph q2hr   -free water deficit of 4L   -will plan to replace with 1/2 NS at 100cc/hr initially for goal decrease in Na of 10meq/ day, switched to D5 at 150/hr with the same goal.  -slowly to prevent cerebral edema  -patient has not had vomiting, diarrhea, not on diuretics   -suspect 2/2 dehydration from advanced dementia
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
## 2018-12-24 NOTE — ASSESSMENT & PLAN NOTE
-patient's only son passed away in 08/2018  -POA is daughter in law   -have initiated GOC discussion upon admission but will follow up in more detail once we have corrected her hypernatremia  - planning for nursing home placement.   - follow up with .

## 2018-12-24 NOTE — ASSESSMENT & PLAN NOTE
- Low pre-albumin   - started on thiamine, folate and vitamin D for malnutrition.  - dietitian consulted for diet, currently on tricklefeeding by NGT.  - vitamins levels in process.  - SLP following regarding possible dysphagia

## 2018-12-24 NOTE — PROGRESS NOTES
"  [facility]  Adult Nutrition  Consult Note    SUMMARY     Recommendations    1. As tolerated, increase TF rate (of Isosource 1.5) to 35 mL/hr to provide 1260 calories, 57 grams of proterin, 642 mL fluid.    - Hold for residuals >300 mL; additional fluid per MD.  2. If/as medically appropriate, ADAT to Regular (texture per SLP).   3. RD to monitor & follow-up.    Goals: Meet % EEN, EPN  Nutrition Goal Status: new  Communication of RD Recs: reviewed with RN    Reason for Assessment    Reason For Assessment: new tube feeding  Diagnosis: other (see comments)(Hypernatremia)  Relevant Medical History: CKD, HTN, Dementia  Interdisciplinary Rounds: attended    General Information Comments: ST recommends NPO - solids, clear liquids. Diet just advanced to clear liquid. Pt also receiving TFs at trickle rate via NGT. No family at bedside, pt disoriented. Per MD notes, pt's daughter reports pt w/ decreased appetite & dysphagia PTA (unsure how long). Per chart review, weight ranges from 76-83 pounds x 2 years. NFPE complete, severe malnutrition noted. Please see PES statement for details.  Nutrition Discharge Planning: Unable to determine    Nutrition/Diet History    Patient Reported Diet/Restrictions/Preferences: other (see comments)(KRISTI)  Spiritual, Cultural Beliefs, Shinto Practices, Values that Affect Care: no  Factors Affecting Nutritional Intake: clear liquid diet, difficulty/impaired swallowing, decreased appetite    Anthropometrics    Temp: 97.4 °F (36.3 °C)  Height Method: Stated  Height: 5' 2" (157.5 cm)  Height (inches): 62 in  Weight Method: Bed Scale  Weight: 34.7 kg (76 lb 8 oz)  Weight (lb): 76.5 lb  Ideal Body Weight (IBW), Female: 110 lb  % Ideal Body Weight, Female (lb): 69.55 lb  BMI (Calculated): 14  BMI Grade: less than 16 protein-energy malnutrition grade III  Usual Body Weight (UBW), kg: (76-83# x 2 years)    Lab/Procedures/Meds    Pertinent Labs Reviewed: reviewed  Pertinent " Labs Comments: Na 163, BUN 84, Creat 3.5, GFR 12.8, Gluc 137  Pertinent Medications Reviewed: reviewed  Pertinent Medications Comments: D5    Estimated/Assessed Needs    Weight Used For Calorie Calculations: 34.7 kg (76 lb 8 oz)     Energy Calorie Requirements (kcal): 1956-7129 kcal/d  Energy Need Method: Kcal/kg, other (see comments)(35-40 kcal/kg)     Protein Requirements: 52 g/d (1.5 g/kg)  Weight Used For Protein Calculations: 34.7 kg (76 lb 8 oz)     Estimated Fluid Requirement Method: other (see comments)(Per MD or 1 mL/kcal)    Nutrition Prescription Ordered    Current Diet Order: Clear liquid diet  Current Nutrition Support Formula Ordered: Isosource 1.5  Current Nutrition Support Rate Ordered: 10 mL/hr    Evaluation of Received Nutrient/Fluid Intake    Enteral Calories (kcal): 360  Enteral Protein (gm): 17  Enteral (Free Water) Fluid (mL): 184    % Kcal Needs: 30%  % Protein Needs: 33%    Energy Calories Required: not meeting needs  Protein Required: not meeting needs  Fluid Required: other (see comments)(Per MD or 1 mL/kcal)    Comments: LBM recorded: 12/19    Tolerance: tolerating    Nutrition Risk    Level of Risk/Frequency of Follow-up: (2x/week)     Assessment and Plan    Malnutrition      Malnutrition in the context of Acute Illness/Injury    Related to (etiology):  Decreased appetite, dysphagia     Signs and Symptoms (as evidenced by):  Energy Intake: <75% of estimated energy requirement for Unsure   Body Fat Depletion: severe depletion of orbitals and triceps   Muscle Mass Depletion: severe depletion of temples, clavicle region, scapular region and lower extremities     Nutrition Diagnosis Status:  New      Monitor and Evaluation    Food and Nutrient Intake: energy intake, food and beverage intake, enteral nutrition intake  Food and Nutrient Adminstration: diet order, enteral and parenteral nutrition administration  Physical Activity and Function: nutrition-related ADLs and IADLs  Anthropometric  Measurements: weight, weight change  Biochemical Data, Medical Tests and Procedures: lipid profile, inflammatory profile, glucose/endocrine profile, gastrointestinal profile, electrolyte and renal panel  Nutrition-Focused Physical Findings: overall appearance     Nutrition Follow-Up    RD Follow-up?: Yes

## 2018-12-24 NOTE — HOSPITAL COURSE
Patient NA improving with D5 .45 NS, down from 174 to 163 overnight, switched to D5 with rate of 150 ml/hr, renal function improving, on thiamine, folate and vitamin D for malnutrition, dietitian consulted for diet, currently on tricklefeeding by NGT, vitamins levels in process, SW consulted for placement. Trickle feeds were advanced to full feeds and IVF fluids transitioned to FW boluses. Patient was stepped down to the floor on 12/25.

## 2018-12-24 NOTE — CONSULTS
Midline placed  in right basilic, size [id number] Lot# [id number] Removal date on or before 1/22/19. Needle advanced into vessel with real time ultrasound guidance. Image recorded and saved.

## 2018-12-24 NOTE — PLAN OF CARE
Problem: Adult Inpatient Plan of Care  Goal: Plan of Care Review  Outcome: Ongoing (interventions implemented as appropriate)  Patient oriented to self only. Plan of care maintained and discussed. HR SB-SR. BP elevated >180. x1 dose of hydralazine ordered. D5 1/2 NS at 150 mL/hr. Isosource tube feeds at goal of 10/mLhr. 50 mL water flushed administered. Han catheter to gravity. Patient remains free from injury/fall. Will continue to monitor

## 2018-12-24 NOTE — ASSESSMENT & PLAN NOTE
Malnutrition in the context of Acute Illness/Injury    Related to (etiology):  Decreased appetite, dysphagia     Signs and Symptoms (as evidenced by):  Energy Intake: <75% of estimated energy requirement for Unsure   Body Fat Depletion: severe depletion of orbitals and triceps   Muscle Mass Depletion: severe depletion of temples, clavicle region, scapular region and lower extremities     Nutrition Diagnosis Status:  Continues

## 2018-12-24 NOTE — PLAN OF CARE
Problem: Adult Inpatient Plan of Care  Goal: Plan of Care Review  Outcome: Ongoing (interventions implemented as appropriate)    Recommendations     1. As tolerated, increase TF rate (of Isosource 1.5) to 35 mL/hr to provide 1260 calories, 57 grams of proterin, 642 mL fluid.               - Hold for residuals >300 mL; additional fluid per MD.  2. If/as medically appropriate, ADAT to Regular (texture per SLP).   3. RD to monitor & follow-up.

## 2018-12-24 NOTE — PLAN OF CARE
Jena Alvarez MD  [address] / [place] [address]    [facility] - [address]  [address]  [place] [address]  Phone: [phone] Fax: [phone]    Payor: [employer] MEDICARE / Plan: [employer] [facility] HEALTH / Product Type: Medicare Advantage /     No future appointments.     Extended Emergency Contact Information  Primary Emergency Contact: Kristina Delgado  Mobile Phone: [phone]  Relation: Relative     12/24/18 1244   Discharge Assessment   Assessment Type Discharge Planning Assessment   Confirmed/corrected address and phone number on facesheet? Yes   Assessment information obtained from? Caregiver;Medical Record   Expected Length of Stay (days) 4   Communicated expected length of stay with patient/caregiver no   Prior to hospitilization cognitive status: Not Oriented to Time;Not Oriented to Place   Prior to hospitalization functional status: Wheelchair Bound;Partially Dependent   Current cognitive status: Not Oriented to Time;Not Oriented to Place   Current Functional Status: Needs Assistance;Partially Dependent;Assistive Equipment   Facility Arrived From: ED [facility]   Lives With other relative(s)  (Daughter in Law)   Able to Return to Prior Arrangements no   Is patient able to care for self after discharge? No   Who are your caregiver(s) and their phone number(s)? Ms. Acevedo (DIL/POA) [phone]   Patient's perception of discharge disposition nursing home;skilled nursing facility   Readmission Within the Last 30 Days no previous admission in last 30 days   Patient currently being followed by outpatient case management? No   Patient currently receives any other outside agency services? No   Equipment Currently Used at Home bedside commode;walker, rolling   Do you have any problems affording any of your prescribed medications? No   Is the patient taking medications as prescribed? yes   Does the patient have  transportation home? Yes   Transportation Anticipated family or friend will provide   Does the patient receive services at the [facility]? No   Discharge Plan A New Nursing Home placement - shelter care facility   Discharge Plan B Skilled Nursing Facility   Patient/Family in Agreement with Plan yes     Plan for NHP at [facility].  See other  documentation for further details.    Conchis Tripathi RN, BSN, CCM  Case Management  [facility]  Ext. [phone]

## 2018-12-25 LAB
ALBUMIN SERPL BCP-MCNC: 2.6 G/DL
ALBUMIN SERPL BCP-MCNC: 2.7 G/DL
ALBUMIN SERPL BCP-MCNC: 2.8 G/DL
ANION GAP SERPL CALC-SCNC: 7 MMOL/L
ANION GAP SERPL CALC-SCNC: 7 MMOL/L
ANION GAP SERPL CALC-SCNC: 8 MMOL/L
ANION GAP SERPL CALC-SCNC: 9 MMOL/L
ANION GAP SERPL CALC-SCNC: 9 MMOL/L
BASOPHILS # BLD AUTO: 0.02 K/UL
BASOPHILS NFR BLD: 0.2 %
BUN SERPL-MCNC: 47 MG/DL
BUN SERPL-MCNC: 52 MG/DL
BUN SERPL-MCNC: 54 MG/DL
BUN SERPL-MCNC: 56 MG/DL
BUN SERPL-MCNC: 59 MG/DL
CALCIUM SERPL-MCNC: 8.9 MG/DL
CALCIUM SERPL-MCNC: 9 MG/DL
CALCIUM SERPL-MCNC: 9 MG/DL
CHLORIDE SERPL-SCNC: 124 MMOL/L
CHLORIDE SERPL-SCNC: 126 MMOL/L
CHLORIDE SERPL-SCNC: 128 MMOL/L
CHLORIDE SERPL-SCNC: 128 MMOL/L
CHLORIDE SERPL-SCNC: 130 MMOL/L
CK SERPL-CCNC: 99 U/L
CO2 SERPL-SCNC: 16 MMOL/L
CO2 SERPL-SCNC: 17 MMOL/L
CO2 SERPL-SCNC: 17 MMOL/L
CO2 SERPL-SCNC: 19 MMOL/L
CO2 SERPL-SCNC: 20 MMOL/L
CREAT SERPL-MCNC: 2.5 MG/DL
CREAT SERPL-MCNC: 2.7 MG/DL
CREAT SERPL-MCNC: 2.8 MG/DL
DIFFERENTIAL METHOD: ABNORMAL
EOSINOPHIL # BLD AUTO: 0.2 K/UL
EOSINOPHIL NFR BLD: 1.8 %
ERYTHROCYTE [DISTWIDTH] IN BLOOD BY AUTOMATED COUNT: 18.7 %
EST. GFR  (AFRICAN AMERICAN): 16.7 ML/MIN/1.73 M^2
EST. GFR  (AFRICAN AMERICAN): 17.5 ML/MIN/1.73 M^2
EST. GFR  (AFRICAN AMERICAN): 19.2 ML/MIN/1.73 M^2
EST. GFR  (NON AFRICAN AMERICAN): 14.5 ML/MIN/1.73 M^2
EST. GFR  (NON AFRICAN AMERICAN): 15.2 ML/MIN/1.73 M^2
EST. GFR  (NON AFRICAN AMERICAN): 16.7 ML/MIN/1.73 M^2
GLUCOSE SERPL-MCNC: 109 MG/DL
GLUCOSE SERPL-MCNC: 88 MG/DL
GLUCOSE SERPL-MCNC: 93 MG/DL
HCT VFR BLD AUTO: 31 %
HGB BLD-MCNC: 10.4 G/DL
IMM GRANULOCYTES # BLD AUTO: 0.09 K/UL
IMM GRANULOCYTES NFR BLD AUTO: 0.8 %
LYMPHOCYTES # BLD AUTO: 1.1 K/UL
LYMPHOCYTES NFR BLD: 9.8 %
MAGNESIUM SERPL-MCNC: 1.8 MG/DL
MCH RBC QN AUTO: 25.1 PG
MCHC RBC AUTO-ENTMCNC: 33.5 G/DL
MCV RBC AUTO: 75 FL
MONOCYTES # BLD AUTO: 0.7 K/UL
MONOCYTES NFR BLD: 5.9 %
NEUTROPHILS # BLD AUTO: 9.2 K/UL
NEUTROPHILS NFR BLD: 81.5 %
NRBC BLD-RTO: 1 /100 WBC
PHOSPHATE SERPL-MCNC: 2.9 MG/DL
PHOSPHATE SERPL-MCNC: 3 MG/DL
PHOSPHATE SERPL-MCNC: 3.1 MG/DL
PHOSPHATE SERPL-MCNC: 3.2 MG/DL
PLATELET # BLD AUTO: 114 K/UL
PMV BLD AUTO: ABNORMAL FL
POTASSIUM SERPL-SCNC: 3.5 MMOL/L
POTASSIUM SERPL-SCNC: 3.5 MMOL/L
POTASSIUM SERPL-SCNC: 3.6 MMOL/L
POTASSIUM SERPL-SCNC: 3.6 MMOL/L
POTASSIUM SERPL-SCNC: 3.8 MMOL/L
RBC # BLD AUTO: 4.15 M/UL
SODIUM SERPL-SCNC: 149 MMOL/L
SODIUM SERPL-SCNC: 152 MMOL/L
SODIUM SERPL-SCNC: 154 MMOL/L
SODIUM SERPL-SCNC: 155 MMOL/L
SODIUM SERPL-SCNC: 155 MMOL/L
WBC # BLD AUTO: 11.22 K/UL

## 2018-12-25 PROCEDURE — 25000003 PHARM REV CODE 250: Performed by: STUDENT IN AN ORGANIZED HEALTH CARE EDUCATION/TRAINING PROGRAM

## 2018-12-25 PROCEDURE — 94761 N-INVAS EAR/PLS OXIMETRY MLT: CPT

## 2018-12-25 PROCEDURE — 11000001 HC ACUTE MED/SURG PRIVATE ROOM

## 2018-12-25 PROCEDURE — 63600175 PHARM REV CODE 636 W HCPCS: Performed by: STUDENT IN AN ORGANIZED HEALTH CARE EDUCATION/TRAINING PROGRAM

## 2018-12-25 PROCEDURE — 97530 THERAPEUTIC ACTIVITIES: CPT

## 2018-12-25 PROCEDURE — 83735 ASSAY OF MAGNESIUM: CPT

## 2018-12-25 PROCEDURE — 80069 RENAL FUNCTION PANEL: CPT

## 2018-12-25 PROCEDURE — 99233 PR SUBSEQUENT HOSPITAL CARE,LEVL III: ICD-10-PCS | Mod: ,,, | Performed by: HOSPITALIST

## 2018-12-25 PROCEDURE — 99233 SBSQ HOSP IP/OBS HIGH 50: CPT | Mod: ,,, | Performed by: HOSPITALIST

## 2018-12-25 PROCEDURE — 80069 RENAL FUNCTION PANEL: CPT | Mod: 91

## 2018-12-25 PROCEDURE — 85025 COMPLETE CBC W/AUTO DIFF WBC: CPT

## 2018-12-25 PROCEDURE — 99233 SBSQ HOSP IP/OBS HIGH 50: CPT | Mod: ,,, | Performed by: INTERNAL MEDICINE

## 2018-12-25 PROCEDURE — G8979 MOBILITY GOAL STATUS: HCPCS | Mod: CL

## 2018-12-25 PROCEDURE — 36415 COLL VENOUS BLD VENIPUNCTURE: CPT

## 2018-12-25 PROCEDURE — 82550 ASSAY OF CK (CPK): CPT

## 2018-12-25 PROCEDURE — G8978 MOBILITY CURRENT STATUS: HCPCS | Mod: CM

## 2018-12-25 PROCEDURE — 97161 PT EVAL LOW COMPLEX 20 MIN: CPT

## 2018-12-25 PROCEDURE — 99233 PR SUBSEQUENT HOSPITAL CARE,LEVL III: ICD-10-PCS | Mod: ,,, | Performed by: INTERNAL MEDICINE

## 2018-12-25 PROCEDURE — 97165 OT EVAL LOW COMPLEX 30 MIN: CPT

## 2018-12-25 RX ADMIN — HEPARIN SODIUM 5000 UNITS: 5000 INJECTION, SOLUTION INTRAVENOUS; SUBCUTANEOUS at 01:12

## 2018-12-25 RX ADMIN — HEPARIN SODIUM 5000 UNITS: 5000 INJECTION, SOLUTION INTRAVENOUS; SUBCUTANEOUS at 05:12

## 2018-12-25 RX ADMIN — TRAZODONE HYDROCHLORIDE 50 MG: 50 TABLET ORAL at 08:12

## 2018-12-25 RX ADMIN — AMLODIPINE BESYLATE 10 MG: 10 TABLET ORAL at 08:12

## 2018-12-25 RX ADMIN — HEPARIN SODIUM 5000 UNITS: 5000 INJECTION, SOLUTION INTRAVENOUS; SUBCUTANEOUS at 08:12

## 2018-12-25 RX ADMIN — FOLIC ACID 1 MG: 5 INJECTION, SOLUTION INTRAMUSCULAR; INTRAVENOUS; SUBCUTANEOUS at 08:12

## 2018-12-25 RX ADMIN — THIAMINE HYDROCHLORIDE 100 MG: 100 INJECTION, SOLUTION INTRAMUSCULAR; INTRAVENOUS at 08:12

## 2018-12-25 NOTE — ASSESSMENT & PLAN NOTE
Na of 174 upon admission  -1 NS bolus in ED  -serial BMPs, Mg & Ph q2hr   -free water deficit of 4L   -slowly to prevent cerebral edema  -patient has not had vomiting, diarrhea, not on diuretics   -suspect 2/2 dehydration from advanced dementia  -patient down to Na of 155 on d5w at 75 cc/hr will transition to 400 cc free water boluses TID(equivalent to 50 cc/hr x 24hrs)

## 2018-12-25 NOTE — NURSING TRANSFER
Nursing Transfer Note      12/25/2018     Transfer to 1154[facility] IMTA from [facility]    Transfer via bed    Tele monitoring during transfer.    Transported by RNx2    Medicines sent N/A. Tube feeding sent.    Chart send with patient:YES    Notified: daughter-in-law Kristina @[phone]     Patient reassessed at: 12/25/2018 @14:29    Upon arrival to floor: pt reassessed at bedside by KEITH Gupta

## 2018-12-25 NOTE — PLAN OF CARE
Problem: Physical Therapy Goal  Goal: Physical Therapy Goal  Goals to be met by: 2019      Patient will increase functional independence with mobility by performin. Supine to sit with Stand-by Assistance  2. Sit to supine with Stand-by Assistance  3. Sit to stand transfer with Minimum assistance   4. Bed to chair transfer with Minimal Assistance using LRAD or no AD   5. Gait  x 10 feet with Minimal Assistance using LRAD or no AD.     Outcome: Ongoing (interventions implemented as appropriate)  Pt evaluated and appropriate goals established.

## 2018-12-25 NOTE — PLAN OF CARE
Problem: Adult Inpatient Plan of Care  Goal: Plan of Care Review  Outcome: Ongoing (interventions implemented as appropriate)  No acute events throughout day. See vital signs and assessments in flowsheets. See below for updates on today's progress.     Pulmonary: RA     Cardiovascular: NSR. Intermittent bradycardia. Lowest HR 55.     Neurological: No changes. Oriented to person only. Withdrawn.     Gastrointestinal: BS active. No BM during shift. Team aware pt has not had a BM in 2 weeks. PRN suppository ordered for constipation.     Genitourinary: Han patent. Clear yellow urine. Good output.     Integumentary/Other: no Signs of skin breakdown.     Infusions: KVO    Patient progressing towards goals as tolerated, plan of care communicated and reviewed with Cesilia Delgado and family. POC to monitor sodium levels. All concerns addressed. Will continue to monitor.

## 2018-12-25 NOTE — ASSESSMENT & PLAN NOTE
afebrile, non elevated WBC, no UTI on UA   -most likely 2/2 hypernatremia & worsening dementia  -would consider depression as well  -will formally assess when Na returns to normal limits

## 2018-12-25 NOTE — PT/OT/SLP EVAL
Occupational Therapy   Evaluation    Name: Cesilia Delgado  MRN: [id number]  Admitting Diagnosis:  Hypernatremia      Recommendations:     Discharge Recommendations: nursing facility, skilled  Discharge Equipment Recommendations:  none  Barriers to discharge:  None(increased required level of care )    History:     Occupational Profile:  Living Environment: Pt lives with daughter in law.  Son passed away a few months ago  Previous level of function: Prior to recent decline since sons passing patient was independent with self care and home access   Equipment Used at Home:  bedside commode, walker, rolling  Assistance upon Discharge: family able to assist     Past Medical History:   Diagnosis Date    Anemia     Asthma     CKD (chronic kidney disease), stage III     Dementia     Hypertension 5/28/2013    Pelvic mass in female     Pulmonary hypertension        Past Surgical History:   Procedure Laterality Date    ESOPHAGOGASTRODUODENOSCOPY      ESOPHAGOGASTRODUODENOSCOPY (EGD) N/A 12/20/2016    [facility] [place] [address])    HIP SURGERY Right        Subjective     Chief Complaint: pt with poor functional performance   Patient/Family Comments/goals: return to home     Pain/Comfort:  · Pain Rating 1: 0/10    Patients cultural, spiritual, Latter day conflicts given the current situation: no    Objective:     Communicated with: RN prior to session.  Patient found with: all lines intact and NG tube, telemetry, pressure relief boots, rodrigues catheter, peripheral IV upon OT entry to room.    General Precautions: Standard, fall   Orthopedic Precautions:N/A   Braces: N/A     Occupational Performance:    Bed Mobility:    · Pt max A for supine to sit edge of bed but able to sit with SBA    Functional Mobility/Transfers:  · Functional Mobility: Sit ot stand with Max A     Activities of Daily Living:  · Pt dependent with self care     Cognitive/Visual Perceptual:  Cognitive/Psychosocial Skills:     -    "    Oriented to: pt able ot state her first name    -       Follows Commands/attention:Easily distracted  -       Communication: expressive aphasia  -       Memory: Impaired STM and Impaired LTM  -       Safety awareness/insight to disability: impaired   -       Mood/Affect/Coping skills/emotional control: Appropriate to situation    Physical Exam:  Upper Extremity Range of Motion:     -       Right Upper Extremity: WFL  -       Left Upper Extremity: WFL  Upper Extremity Strength:    -       Right Upper Extremity: WFL  -       Left Upper Extremity: WFL    AMPAC 6 Click ADL:  AMPAC Total Score: 6    Treatment & Education:  Evaluation complete and goals set. Pt educated on safety, role of OT, importance of increased participation in self care for gains , expectations for participation, expectations for gains, POC, energy conservation, caregiver strain. White board updated.   - toielting with max A with max A for standing during hygiene   Education:    Patient left supine with all lines intact    Assessment:     Cesilia Delgado is a 88 y.o. female with a medical diagnosis of Hypernatremia. Pt was overall independet PLOF but with recent decline.  Pt with poor cognitive performance and requiresincreased supervision and assistance   She presents with the following performance deficits affecting function: weakness, impaired endurance, impaired self care skills, impaired functional mobilty, impaired cognition, decreased coordination, gait instability, decreased lower extremity function.      Rehab Prognosis: Fair; patient would benefit from acute skilled OT services to address these deficits and reach maximum level of function.         Clinical Decision Makin.  OT Low:  "Pt evaluation falls under low complexity for evaluation coding due to performance deficits noted in 1-3 areas as stated above and 0 co-morbities affecting current functional status. Data obtained from problem focused assessments. No modifications or " "assistance was required for completion of evaluation. Only brief occupational profile and history review completed."     Plan:     Patient to be seen 3 x/week to address the above listed problems via self-care/home management, therapeutic activities, therapeutic exercises  · Plan of Care Expires: 01/25/19  · Plan of Care Reviewed with: patient    This Plan of care has been discussed with the patient who was involved in its development and understands and is in agreement with the identified goals and treatment plan    GOALS:   Multidisciplinary Problems     Occupational Therapy Goals        Problem: Occupational Therapy Goal    Goal Priority Disciplines Outcome Interventions   Occupational Therapy Goal     OT, PT/OT Ongoing (interventions implemented as appropriate)    Description:  Goals to be met by: 1/15     Patient will increase functional independence with ADLs by performing:    UE Dressing with Set-up Assistance.  LE Dressing with Contact Guard Assistance.  Grooming while seated with Set-up Assistance.  Toileting from toilet with Contact Guard Assistance for hygiene and clothing management.                       Time Tracking:     OT Date of Treatment: 12/25/18  OT Start Time: 0700  OT Stop Time: 0725  OT Total Time (min): 25 min    Billable Minutes:Evaluation 10  Self Care/Home Management 15    Jovana Horvath, OT  12/25/2018    "

## 2018-12-25 NOTE — PT/OT/SLP EVAL
Physical Therapy Evaluation    Patient Name:  Cesilia Delgado   MRN:  [id number]    Co-Eval with OT   Recommendations:     Discharge Recommendations:  nursing facility, skilled   Discharge Equipment Recommendations: (TBD pending progress )   Barriers to discharge: Decreased caregiver support at current functional level     Assessment:     Cesilia Delgado is a 88 y.o. female admitted with a medical diagnosis of Hypernatremia.  She presents with the following impairments/functional limitations:  weakness, impaired endurance, impaired functional mobilty, impaired self care skills, impaired cognition, decreased coordination, decreased safety awareness, pain. Pt tolerated activity with mobility primarily limited by impairment with command following, motor sequencing, and decreased strength. Upon discharge, pt would benefit from continued skilled therapy intervention at skilled nursing facility to progress toward more independent mobility. Pt would continue to benefit from acute skilled therapy intervention to address deficits and progress toward prior level of function.       Rehab Prognosis: Fair; patient would benefit from acute skilled PT services to address these deficits and reach maximum level of function.    Recent Surgery: * No surgery found *      Plan:     During this hospitalization, patient to be seen 4 x/week to address the identified rehab impairments via gait training, therapeutic activities, therapeutic exercises, neuromuscular re-education and progress toward the following goals:    · Plan of Care Expires:  01/25/19    Subjective     Chief Complaint: pt with slurred speech, difficult to understand, pt in no visible distress   Patient/Family Comments/goals: to get better and return home   Pain/Comfort:  · Pain Rating 1: (Pt indicates pain around chest, unable to quantify )  · Pain Addressed 1: Cessation of Activity  · Pain Rating Post-Intervention 1: (no indication of pain )    Patients cultural, spiritual,  Oriental orthodox conflicts given the current situation: no    Living Environment:  Unable to obtain information from pt. Some information obtained from H&P note. Per note, pt lives with her daughter in-law. At baseline, pt is able to prepare her own meals, ambulate, shower etc. Daughter in law noticed onset of dementia sypmtoms beginning 2 years ago. Mental status has been declining past 4 months following death of her son. One week ago, pt experienced a fall getting out of bed with no injury noted. Per notes, it appears family has been looking into nursing home placement.    Equipment used at home: bedside commode, walker, rolling.  DME owned (not currently used): none.  Upon discharge, patient will have assistance from unable to determine.    Objective:     Communicated with RN prior to session.  Patient found supine with NG tube, telemetry, pressure relief boots, rodrigues catheter, peripheral IV  upon PT entry to room.    General Precautions: Standard, fall, aspiration   Orthopedic Precautions:N/A   Braces: N/A     Exams:  · Cognitive Exam:  Patient is able to report her first name with significant encouragement, required cuing for last name and location. Pt dysarthric, difficult to understand. Pt able to follow one step commands inconsistently.   · Sensation:  Unable to obtain consistent response, pt reports intact light touch sensation to feet.   · RUE ROM: WFL PROM 2/2 decreased command following   · RUE Strength: at least 3/5   · LUE ROM: WFL PROM 2/2 decreased command following   · LUE Strength: at least 3/5   · RLE ROM: WFL PROM 2/2 decreased command following   · RLE Strength: WFL  · LLE ROM: WFL PROM 2/2 decreased command following   · LLE Strength: WFL    Functional Mobility:  · Bed Mobility:     · Scooting: dependent drawsheet to scoot higher in bed   · Supine to Sit: moderate assistance for ascent of trunk and shoulders   · Sit to Supine: maximal assistance for descent of trunk and shoulders, lift of B LE    · Transfers:     · Sit to Stand:  maximal assistance with hand-held assist with facilitation at pelvis for upright posture, blocking at L knee   · Sitting balance: static sitting at EOB with SBA with occasional min A for multidirectional sway.       Therapeutic Activities and Exercises:   Pt sat EOB for 8 mins while performing evaluation.   Pt educated on role of PT/POC. Pt verbalized understanding.       AM-PAC 6 CLICK MOBILITY  Total Score:9     Patient left supine with all lines intact, call button in reach and RN  present.    GOALS:   Multidisciplinary Problems     Physical Therapy Goals        Problem: Physical Therapy Goal    Goal Priority Disciplines Outcome Goal Variances Interventions   Physical Therapy Goal     PT, PT/OT Ongoing (interventions implemented as appropriate)     Description:  Goals to be met by: 2019      Patient will increase functional independence with mobility by performin. Supine to sit with Stand-by Assistance  2. Sit to supine with Stand-by Assistance  3. Sit to stand transfer with Minimum assistance   4. Bed to chair transfer with Minimal Assistance using LRAD or no AD   5. Gait  x 10 feet with Minimal Assistance using LRAD or no AD.                       History:     Past Medical History:   Diagnosis Date    Anemia     Asthma     CKD (chronic kidney disease), stage III     Dementia     Hypertension 2013    Pelvic mass in female     Pulmonary hypertension        Past Surgical History:   Procedure Laterality Date    ESOPHAGOGASTRODUODENOSCOPY      ESOPHAGOGASTRODUODENOSCOPY (EGD) N/A 2016    Performed by Eitan Coles MD at [facility] ([address] [place] [address])    HIP SURGERY Right        Clinical Decision Making:     History  Co-morbidities and personal factors that may impact the plan of care Examination  Body Structures and Functions, activity limitations and participation restrictions that may impact the plan of care Clinical Presentation   Decision Making/  Complexity Score   Co-morbidities:   [] Time since onset of injury / illness / exacerbation  [] Status of current condition  []Patient's cognitive status and safety concerns    [] Multiple Medical Problems (see med hx)  Personal Factors:   [] Patient's age  [] Prior Level of function   [] Patient's home situation (environment and family support)  [] Patient's level of motivation  [] Expected progression of patient      HISTORY:(criteria)    [] 43552 - no personal factors/history    [] 46305 - has 1-2 personal factor/comorbidity     [] 74264 - has >3 personal factor/comorbidity     Body Regions:  [] Objective examination findings  [] Head     []  Neck  [] Trunk   [] Upper Extremity  [] Lower Extremity    Body Systems:  [] For communication ability, affect, cognition, language, and learning style: the assessment of the ability to make needs known, consciousness, orientation (person, place, and time), expected emotional /behavioral responses, and learning preferences (eg, learning barriers, education  needs)  [] For the neuromuscular system: a general assessment of gross coordinated movement (eg, balance, gait, locomotion, transfers, and transitions) and motor function  (motor control and motor learning)  [] For the musculoskeletal system: the assessment of gross symmetry, gross range of motion, gross strength, height, and weight  [] For the integumentary system: the assessment of pliability(texture), presence of scar formation, skin color, and skin integrity  [] For cardiovascular/pulmonary system: the assessment of heart rate, respiratory rate, blood pressure, and edema     Activity limitations:    [] Patient's cognitive status and saf ety concerns          [] Status of current condition      [] Weight bearing restriction  [] Cardiopulmunary Restriction    Participation Restrictions:   [] Goals and goal agreement with the patient     [] Rehab potential (prognosis) and probable outcome      Examination of Body System:  (criteria)    [] 42783 - addressing 1-2 elements    [] 19002 - addressing a total of 3 or more elements     [] 53325 -  Addressing a total of 4 or more elements         Clinical Presentation: (criteria)  Choose one     On examination of body system using standardized tests and measures patient presents with 1-2 elements from any of the following: body structures and functions, activity limitations, and/or participation restrictions.  Leading to a clinical presentation that is considered stable and/or uncomplicated                              Clinical Decision Making  (Eval Complexity):  Low- 22215     Time Tracking:     PT Received On: 12/25/18  PT Start Time: 0807     PT Stop Time: 0823  PT Total Time (min): 16 min     Billable Minutes: Evaluation 16 mins       Vickie Fitzgerald, PT  12/25/2018

## 2018-12-25 NOTE — PLAN OF CARE
Hospital Medicine ICU Acceptance Note    Date of Admit: 2018  Date of Transfer / Stepdown: 2018  Kai, C/J, L, Onc (IV chemo w/in 1 month), Gyn/Onc, or other special case?: no   ICU team stepping patient down: MICU  ICU team member giving verbal handoff:Zuleika Oneal MD  Accepting  team: JOSEPH ARBOLEDA    Brief History of Present Illness:      Ms. Delgado is an 87yo woman with PMH of CKD, HTN, pulmonary HTN, former smoker, dementia, and Fe deficiency anemia who is brought in by her daughter in law with complaints of 1-2 weeks onset of altered mental status.      ICU was consulted for severe hydration & Na level found to be 174.      Daughter in law states that in the past week, patient has been more confused than baseline where she is more bed bound and unable to carry out her ADLs independently. She has been also staying up all night. Patient has had decrease in appetite & poor oral intake as well. Family has spoon fed her half a bowl of oatmeal & broccoli cheddar soup earlier. Daughter in law has noted dysphagia occurring over the past week where the patient will have difficulty swallowing and spit up a little bit. No vomiting. When asked about odynophagia she is unsure but states patient will sometimes wince in pain. Patient has also has not had a BM and oliguria producing small amount of urine in her diaper 1-2x yesterday.  She has also had 1 day of dry cough, no noticed SOB or chest pain.      At baseline, Ms. Delgado is able to prepare her own meals, ambulate, shower, etc. Daughter in law noticed patient's dementia initially started 2 years ago, but patient's son had been in denial and it was not worked up. Patient's son  four months ago and family noticed a decline in her mental status since then.      Of note, a week ago, patient had a fall trying to get out of bed and was seen in [place]. They report that imaging was done of her hip which showed no fracture. She did not hit her head  or have LOC.   Per chart review, it seems as though family has been in search for a nursing home placement.        Hospital/ICU Course:     Patient NA improving with D5 .45 NS, down from 174 to 163 overnight, switched to D5 with rate of 150 ml/hr, renal function improving, on thiamine, folate and vitamin D for malnutrition, dietitian consulted for diet, currently on tricklefeeding by NGT, vitamins levels in process, SW consulted for placement. Trickle feeds were advanced to full feeds and IVF fluids transitioned to FW boluses. Patient was stepped down to the floor on 12/25.       Consultants and Procedures:     Consultants:  none    Procedures:    NGT placement    Transfer Information:     Diet:clear liquid      Physical Activity:  PT/OT pending    To Do / Pending Studies / Follow ups:   if Na increases despite FW boluses, start D5W @ 25-50 cc/hr     Estimated Discharge Date: 12/27     Discharge Disposition: Nursing Facility        Patient has been accepted by [facility] Medicine Team B, who will assume care of the patient upon arrival to the floor from the ICU. Please contact ICU team with any concerns prior to arrival. Please contact [facility] Medicine at [phone] or [phone] (please do NOT leave a voicemail) when patient arrives to the floor.    Nabil Brandt MD  Attending Staff Physician  [facility]  pager- [phone]
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
## 2018-12-25 NOTE — PLAN OF CARE
Problem: Occupational Therapy Goal  Goal: Occupational Therapy Goal  Goals to be met by: 1/15     Patient will increase functional independence with ADLs by performing:    UE Dressing with Set-up Assistance.  LE Dressing with Contact Guard Assistance.  Grooming while seated with Set-up Assistance.  Toileting from toilet with Contact Guard Assistance for hygiene and clothing management.     Outcome: Ongoing (interventions implemented as appropriate)  Evaluation complete and goals set.  Cont with POC   Jovana Horvath OT  12/25/2018

## 2018-12-25 NOTE — ASSESSMENT & PLAN NOTE
- NGT placed 12/23 and started trickle feeds  - NGT feeds advanced as tolerated 12/25  - [facility] recommended liquid diet and keeping NGT for now

## 2018-12-25 NOTE — SUBJECTIVE & OBJECTIVE
Interval History/Significant Events: NAEO    Review of Systems   Unable to perform ROS: Dementia     Objective:     Vital Signs (Most Recent):  Temp: 97.7 °F (36.5 °C) (12/25/18 1100)  Pulse: 87 (12/25/18 1300)  Resp: 18 (12/25/18 1300)  BP: (!) 143/63 (12/25/18 1300)  SpO2: 100 % (12/25/18 1300) Vital Signs (24h Range):  Temp:  [97.5 °F (36.4 °C)-97.9 °F (36.6 °C)] 97.7 °F (36.5 °C)  Pulse:  [64-89] 87  Resp:  [13-39] 18  SpO2:  [96 %-100 %] 100 %  BP: (115-159)/(54-98) 143/63   Weight: 34.7 kg (76 lb 8 oz)  Body mass index is 13.99 kg/m².      Intake/Output Summary (Last 24 hours) at 12/25/2018 1336  Last data filed at 12/25/2018 1300  Gross per 24 hour   Intake 2030 ml   Output 1180 ml   Net 850 ml       Physical Exam   Constitutional: No distress.   Severely frail & thin appearing elderly woman   HENT:   Head: Normocephalic and atraumatic.   Facial muscle waisting    Eyes: No scleral icterus.   Bilateral glaucoma   Neck: Normal range of motion. Neck supple. No JVD present. No thyromegaly present.   Cardiovascular: Normal rate, regular rhythm and normal heart sounds.   Pulmonary/Chest: Effort normal and breath sounds normal. No stridor. No respiratory distress. She has no wheezes. She has no rales.   Abdominal: Soft. Bowel sounds are normal. She exhibits no distension. There is no tenderness. There is no guarding.   Musculoskeletal: She exhibits no edema, tenderness or deformity.   L. Elbow healing bruise    Neurological: She is alert.   Oriented to self;  Follow most of commaneds ,Speaks in nonsensical phrases    Skin: Skin is warm and dry. She is not diaphoretic.   Psychiatric: She has a normal mood and affect.   Nursing note and vitals reviewed.      Vents:     Lines/Drains/Airways     Drain                 NG/OG Tube 12/23/18 1500 nasogastric 16 Fr. Right nostril 1 day         Urethral Catheter 12/23/18 1404 16 Fr. 1 day          Peripheral Intravenous Line                 Peripheral IV - Single Lumen  12/23/18 1500 Right Antecubital 1 day         Midline Catheter Insertion/Assessment  - Single Lumen 12/24/18 1459 Right basilic vein (medial side of arm) 18g x 8cm less than 1 day              Significant Labs:    CBC/Anemia Profile:  Recent Labs   Lab 12/24/18  0451 12/25/18  0455   WBC 10.67 11.22   HGB 10.8* 10.4*   HCT 33.7* 31.0*    114*   MCV 78* 75*   RDW 19.2* 18.7*        Chemistries:  Recent Labs   Lab 12/24/18  0934  12/25/18  0455 12/25/18  0842 12/25/18  1133   NA  --    < > 155* 155* 152*   K  --    < > 3.6 3.5 3.5   CL  --    < > 128* 128* 126*   CO2  --    < > 20* 19* 17*   BUN  --    < > 56* 54* 52*   CREATININE  --    < > 2.8* 2.8* 2.7*   CALCIUM  --    < > 8.9 9.0 8.9   ALBUMIN  --    < > 2.8* 2.7* 2.7*   MG 2.0  --  1.8  --   --    PHOS  --    < > 3.0  3.0 3.0 3.1    < > = values in this interval not displayed.       All pertinent labs within the past 24 hours have been reviewed.    Significant Imaging:  I have reviewed all pertinent imaging results/findings within the past 24 hours.
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
## 2018-12-25 NOTE — ASSESSMENT & PLAN NOTE
- Low pre-albumin   - started on thiamine, folate and vitamin D for malnutrition.  - dietitian consulted for diet, currently on tricklefeeding by NGT.  - advancing NGT feeds as tolerated  12/25 to full feeds per Dietician recs  - vitamins levels in process.  - SLP following regarding possible dysphagia

## 2018-12-25 NOTE — PLAN OF CARE
Problem: Adult Inpatient Plan of Care  Goal: Plan of Care Review  Outcome: Ongoing (interventions implemented as appropriate)  Patient oriented to self only. Daughter-in-law visited during the shift. Plan of care and all safety precautions maintained and discussed. Vital signs stable throuhgout the shift. HR SR on tele. R Nare NG tube with Isosource at goal of 10 mL/hr. Last sodium 154. Patient remains free from fall/injury. Will continue to monitor.

## 2018-12-25 NOTE — RESIDENT HANDOFF
Handoff     Primary Team: [facility] Room Number: [id number]     Patient Name: Cesilia Delgado MRN: [id number]     Date of Birth: [id number] Allergies: Patient has no known allergies.     Age: 88 y.o. Admit Date: 12/22/2018     Sex: female  BMI: Body mass index is 13.99 kg/m².     Code Status: Full Code        Illness Level (current clinical status): Watcher - No    Reason for Admission: Hypernatremia    Brief HPI (pertinent PMH and diagnosis or differential diagnosis): Ms. Delgado is an 87yo woman with PMH of CKD, HTN, pulmonary HTN, former smoker, dementia, and Fe deficiency anemia who is brought in by her daughter in law with complaints of 1-2 weeks onset of altered mental status.     ICU was consulted for severe hypernatremia(Na level found to be 174), severe malnutrition, and hypovolemia.         Procedure Date: n/a    Hospital Course (updated, brief assessment by system or problem, significant events): Patient NA improving with D5 .45 NS, down from 174 to 163 overnight, switched to D5 with rate of 150 ml/hr, renal function improving, on thiamine, folate and vitamin D for malnutrition, dietitian consulted for diet, currently on tricklefeeding by NGT, vitamins levels in process, SW consulted for placement. Na continued to trend down from 163 to 155 on 12/25. Trickle feeds were advanced to full feeds as tolerated and IVF fluids transitioned to free water boluses. Patient was stepped down to the floor on 12/25.        Tasks (specific, using if-then statements): Follow up on BMPs after FW boluses to ensure NA continues to return to normal ranges  Contingency Plan (special circumstances anticipated and plan): if Na increases despite FW boluses, start D5W @ 25-50 cc/hr    Estimated Discharge Date: 12/27    Discharge Disposition: Nursing Facility    Mentored By: Romario Bhatia MD

## 2018-12-25 NOTE — PROGRESS NOTES
Progress Note  Hospital Medicine    Admit Date: 12/22/2018  Length of Stay:  LOS: 2 days     SUBJECTIVE:         Follow-up For:  Hypernatremia    HPI/Interval history (See H&P for complete P,F,SHx) :     AAOX 1. discussed with POA - daughter in law at the bedside    Review of Systems: List if applicable  Unable to perform ROS: encephaloathic - AAOX 1         OBJECTIVE:     Vital Signs Range (Last 24H):  Temp:  [97.5 °F (36.4 °C)-97.9 °F (36.6 °C)]   Pulse:  [66-89]   Resp:  [13-39]   BP: (115-159)/(54-98)   SpO2:  [96 %-100 %]     Physical Exam:  Physical Exam   Constitutional: No distress.   Severely frail & thin appearing elderly woman   HENT:   Head: Normocephalic and atraumatic.   Facial muscle waisting    Eyes: No scleral icterus.    Neck: Normal range of motion. Neck supple. No JVD present. No thyromegaly present. NGT insitu   Cardiovascular: Normal rate, regular rhythm and normal heart sounds.   Pulmonary/Chest: Effort normal and breath sounds normal. No stridor. No respiratory distress. She has no wheezes. She has no rales.   Abdominal: Soft. Bowel sounds are normal. She exhibits no distension. There is no tenderness. There is no guarding.   Musculoskeletal: She exhibits no edema, tenderness or deformity.   L. Elbow healing bruise    Neurological: She is alert.   Oriented to self;  Follows simple commands  ,Speaks in nonsensical phrases    Skin: Skin is warm and dry. She is not diaphoretic.   Psychiatric: She has a normal mood and affect.   Nursing note and vitals reviewed.            Medications:  Medication list was reviewed and changes noted under Assessment/Plan.      Current Facility-Administered Medications:     amLODIPine tablet 10 mg, 10 mg, Oral, Daily, MATTHEW Mcgraw, 10 mg at 12/25/18 0856    bisacodyl suppository 10 mg, 10 mg, Rectal, Daily PRN, Neda Snider, NP    calcium-vitamin D3 500 mg(1,250mg) -200 unit per tablet 1 tablet, 1 tablet, Per NG tube, Once, MATTHEW Mcgraw     folic acid 1 mg in dextrose 5 % 100 mL IVPB, 1 mg, Intravenous, Daily, Zuleika Oneal MD, Last Rate: 100 mL/hr at 12/25/18 0857, 1 mg at 12/25/18 0857    heparin (porcine) injection 5,000 Units, 5,000 Units, Subcutaneous, Q8H, Keri Bangura MD, 5,000 Units at 12/25/18 1324    sodium chloride 0.9% flush 3 mL, 3 mL, Intravenous, PRN, Keri Bangura MD    thiamine (B-1) 100 mg in dextrose 5 % 50 mL IVPB, 100 mg, Intravenous, Daily, Zuleika Oneal MD, Last Rate: 12.5 mL/hr at 12/25/18 0857, 100 mg at 12/25/18 0857    traZODone tablet 50 mg, 50 mg, Oral, QHS, Keri Bangura MD    bisacodyl, sodium chloride 0.9%    Laboratory/Diagnostic Data:  Reviewed and noted in plan where applicable- Please see chart for full lab data.    Recent Labs   Lab 12/23/18  0118 12/24/18  0451 12/25/18  0455   WBC 10.13 10.67 11.22   HGB 12.8 10.8* 10.4*   HCT 39.9 33.7* 31.0*    165 114*       Recent Labs   Lab 12/22/18  2301 12/23/18  0118  12/23/18  1234  12/24/18  0934  12/25/18  0455 12/25/18  0842 12/25/18  1133   * 174*   < > 170*  170*  170*   < >  --    < > 155* 155* 152*   K 5.8* 5.0   < > 4.4  4.4  4.4   < >  --    < > 3.6 3.5 3.5   CL >130* >130*   < > >130*  >130*  >130*   < >  --    < > 128* 128* 126*   CO2 19* 20*   < > 18*  18*  18*   < >  --    < > 20* 19* 17*   * 112*   < > 108*  108*  108*   < >  --    < > 56* 54* 52*   CREATININE 5.6* 5.6*   < > 4.5*  4.5*  4.5*   < >  --    < > 2.8* 2.8* 2.7*   * 108   < > 125*  125*  125*   < >  --    < > 88 88 93   CALCIUM 11.0* 11.0*   < > 9.8  9.8  9.8   < >  --    < > 8.9 9.0 8.9   MG 3.9* 3.4*   < > 3.0*  3.0*  3.0*  --  2.0  --  1.8  --   --    PHOS  --   --    < > 4.3  4.3  4.3   < >  --    < > 3.0  3.0 3.0 3.1   LIPASE 212* 198*  --   --   --   --   --   --   --   --     < > = values in this interval not displayed.       Recent Labs   Lab 12/22/18  2301 12/23/18  0118  12/25/18  0455 12/25/18  0842 12/25/18  1133   ALKPHOS  66 63  --   --   --   --    ALT 9* 7*  --   --   --   --    AST 22 12  --   --   --   --    ALBUMIN 3.8 3.7   < > 2.8* 2.7* 2.7*   PROT 8.8* 8.1  --   --   --   --    BILITOT 0.8 0.8  --   --   --   --     < > = values in this interval not displayed.        Microbiology labs for the last week  Microbiology Results (last 7 days)     Procedure Component Value Units Date/Time    Urine culture [[id number]] Collected:  12/23/18 1731    Order Status:  Sent Specimen:  Urine, Catheterized Updated:  12/23/18 1732           Imaging Results          X-Ray Abdomen Flat And Erect (Final result)  Result time 12/23/18 00:15:32    Final result by Zay Hernadez MD (12/23/18 00:15:32)                 Impression:      Nonobstructed bowel-gas pattern.      Electronically signed by: Zay Hernadez MD  Date:    12/23/2018  Time:    00:15             Narrative:    EXAMINATION:  XR ABDOMEN FLAT AND ERECT    CLINICAL HISTORY:  Constipation, unspecified    TECHNIQUE:  Flat and erect AP views of the abdomen were preformed.    COMPARISON:  None    FINDINGS:  Costochondral calcifications project over the upper abdomen.  Bowel gas pattern is non-obstructive.  No evidence for pneumoperitoneum.  Partially visualized fixation hardware at the right hip.                               CT Head Without Contrast (Final result)  Result time 12/23/18 00:26:39    Final result by Zay Hernadez MD (12/23/18 00:26:39)                 Impression:      1. No evidence of acute intracranial hemorrhage or major vascular distribution infarct.  2. Remote right occipital infarct.  Remote lacunar infarct right basal ganglia.  3. Chronic microvascular ischemic change.  4. Generalized cerebral volume loss.    Electronically signed by resident: Shaun Juan  Date:    12/22/2018  Time:    23:59    Electronically signed by: Zay Hernadez MD  Date:    12/23/2018  Time:    00:26             Narrative:    EXAMINATION:  CT HEAD WITHOUT CONTRAST    CLINICAL  "HISTORY:  Confusion/delirium, altered LOC, unexplained    TECHNIQUE:  Low dose axial images were obtained through the head.  Coronal and sagittal reformations were also performed. Contrast was not administered.    COMPARISON:  MRI brain 06/28/2016, CT head 06/27/2016    FINDINGS:  Age-appropriate generalized cerebral volume loss with compensatory widening of the sulci and ventricular system.  No evidence of hydrocephalus.  No extra-axial blood or fluid collections.    Encephalomalacia of the right paramedian occipital lobe compatible with remote infarct.  Remote lacunar infarct right basal ganglia, unchanged.  Patchy and confluent hypoattenuation of the supratentorial white matter consistent with chronic microvascular ischemic change.    No evidence of acute intracranial hemorrhage or major vascular distribution infarct.  No parenchymal mass or edema.    No calvarial fracture.  Mastoid air cells and paranasal sinuses are clear.                                Estimated body mass index is 13.99 kg/m² as calculated from the following:    Height as of this encounter: 5' 2" (1.575 m).    Weight as of this encounter: 34.7 kg (76 lb 8 oz).    I & O (Last 24H):    Intake/Output Summary (Last 24 hours) at 12/25/2018 1542  Last data filed at 12/25/2018 1400  Gross per 24 hour   Intake 1880 ml   Output 1130 ml   Net 750 ml       Estimated Creatinine Clearance: 7.9 mL/min (A) (based on SCr of 2.7 mg/dL (H)).    ASSESSMENT/PLAN:     Active Problems:    Active Hospital Problems    Diagnosis  POA    *Hypernatremia [E87.0]    Na of 174 upon admission  -serial BMPs q 8hrly  -suspect 2/2 dehydration from advanced dementia  -patient down to Na of 152 on d5w at 75 cc/hr will transition to 400 cc free water boluses TID(equivalent to 50 cc/hr x 24hrs)        Yes    Goals of care, counseling/discussion [Z71.89]full code for now   Not Applicable    Insomnia [G47.00]on trazodone  Yes    Dysphagia [R13.10]s/p SLP Evaluation, likely " encephalopathy. started clear liquids   NGT placed  and started trickle feeds  - NGT feeds advanced as tolerated   - SLP recommended liquid diet and keeping NGT for now     Unknown    Electrolyte disturbance [E87.8]as above  Unknown    Caregiver burden [Z63.6]Patient's son  four months ago and family noticed a decline in her mental status since then. NH options for placement  Not Applicable    Mood disturbance [R45.86] R/o depression post sons's demise  Unknown    Acute encephalopathy [G93.40]oriented to self. likely metabolic enecophalopathy. improving .on thiamine, folate and vitamin D for malnutrition, dietitian consulted for diet, currently on tricklefeeding by NGT, vitamins levels in process.afebrile, non elevated WBC, no UTI on UA . CT head - No evidence of acute intracranial hemorrhage or major vascular distribution infarct. Remote right occipital infarct    Unknown    Malnutrition [E46]Low pre-albumin/ SLP eval  Yes     Chronic    Stage 4 chronic kidney disease [N18.4]  Yes     Chronic    ODILIA (acute kidney injury) [N17.9]on CKD - Cr 5.6--> 2.7  improving with IV hydration. monitor    anemia - Hb 10.4 microcytic. ferrokinetics   Thrombocytopenia 114. monitor  Falls - 2 episodes at home no repoted LOC. obtain CPK    Unknown    Essential hypertension [I10]controlled on amlodipine  Yes     Chronic      Resolved Hospital Problems   No resolved problems to display.         Disposition- NH vs SNF    DVT prophylaxis addressed with: AMBER Brandt MD  Attending Staff Physician  [facility]  pager- [phone]  Spectralhvn - [phone]

## 2018-12-26 LAB
A-TOCOPHEROL VIT E SERPL-MCNC: 1298 UG/DL (ref 500–1800)
ALBUMIN SERPL BCP-MCNC: 2.4 G/DL
ALBUMIN SERPL BCP-MCNC: 2.5 G/DL
ANION GAP SERPL CALC-SCNC: 6 MMOL/L
ANION GAP SERPL CALC-SCNC: 7 MMOL/L
ANION GAP SERPL CALC-SCNC: 7 MMOL/L
ANION GAP SERPL CALC-SCNC: 8 MMOL/L
BASOPHILS # BLD AUTO: 0.02 K/UL
BASOPHILS NFR BLD: 0.3 %
BUN SERPL-MCNC: 44 MG/DL
BUN SERPL-MCNC: 45 MG/DL
BUN SERPL-MCNC: 46 MG/DL
BUN SERPL-MCNC: 47 MG/DL
CALCIUM SERPL-MCNC: 9 MG/DL
CALCIUM SERPL-MCNC: 9 MG/DL
CALCIUM SERPL-MCNC: 9.2 MG/DL
CALCIUM SERPL-MCNC: 9.3 MG/DL
CHLORIDE SERPL-SCNC: 119 MMOL/L
CHLORIDE SERPL-SCNC: 120 MMOL/L
CHLORIDE SERPL-SCNC: 121 MMOL/L
CHLORIDE SERPL-SCNC: 122 MMOL/L
CO2 SERPL-SCNC: 19 MMOL/L
CO2 SERPL-SCNC: 21 MMOL/L
CO2 SERPL-SCNC: 22 MMOL/L
CO2 SERPL-SCNC: 23 MMOL/L
CREAT SERPL-MCNC: 2.4 MG/DL
CREAT SERPL-MCNC: 2.5 MG/DL
DIFFERENTIAL METHOD: ABNORMAL
EOSINOPHIL # BLD AUTO: 0.1 K/UL
EOSINOPHIL NFR BLD: 1.7 %
ERYTHROCYTE [DISTWIDTH] IN BLOOD BY AUTOMATED COUNT: 18.3 %
EST. GFR  (AFRICAN AMERICAN): 19.2 ML/MIN/1.73 M^2
EST. GFR  (AFRICAN AMERICAN): 20.2 ML/MIN/1.73 M^2
EST. GFR  (NON AFRICAN AMERICAN): 16.7 ML/MIN/1.73 M^2
EST. GFR  (NON AFRICAN AMERICAN): 17.5 ML/MIN/1.73 M^2
FERRITIN SERPL-MCNC: 66 NG/ML
GLUCOSE SERPL-MCNC: 103 MG/DL
GLUCOSE SERPL-MCNC: 111 MG/DL
GLUCOSE SERPL-MCNC: 127 MG/DL
GLUCOSE SERPL-MCNC: 92 MG/DL
HCT VFR BLD AUTO: 28.4 %
HGB BLD-MCNC: 9.5 G/DL
IMM GRANULOCYTES # BLD AUTO: 0.05 K/UL
IMM GRANULOCYTES NFR BLD AUTO: 0.6 %
IRON SERPL-MCNC: 45 UG/DL
LYMPHOCYTES # BLD AUTO: 1.1 K/UL
LYMPHOCYTES NFR BLD: 14.6 %
MAGNESIUM SERPL-MCNC: 1.9 MG/DL
MCH RBC QN AUTO: 24.7 PG
MCHC RBC AUTO-ENTMCNC: 33.5 G/DL
MCV RBC AUTO: 74 FL
MONOCYTES # BLD AUTO: 0.5 K/UL
MONOCYTES NFR BLD: 6.9 %
NEUTROPHILS # BLD AUTO: 6 K/UL
NEUTROPHILS NFR BLD: 75.9 %
NRBC BLD-RTO: 1 /100 WBC
PHOSPHATE SERPL-MCNC: 2.7 MG/DL
PHOSPHATE SERPL-MCNC: 2.8 MG/DL
PHOSPHATE SERPL-MCNC: 2.9 MG/DL
PHOSPHATE SERPL-MCNC: 2.9 MG/DL
PHOSPHATE SERPL-MCNC: 3 MG/DL
PLATELET # BLD AUTO: 145 K/UL
PMV BLD AUTO: ABNORMAL FL
POTASSIUM SERPL-SCNC: 3.5 MMOL/L
POTASSIUM SERPL-SCNC: 3.5 MMOL/L
POTASSIUM SERPL-SCNC: 3.6 MMOL/L
POTASSIUM SERPL-SCNC: 3.7 MMOL/L
RBC # BLD AUTO: 3.84 M/UL
SATURATED IRON: 38 %
SODIUM SERPL-SCNC: 148 MMOL/L
SODIUM SERPL-SCNC: 149 MMOL/L
TOTAL IRON BINDING CAPACITY: 120 UG/DL
TRANSFERRIN SERPL-MCNC: 81 MG/DL
VIT A SERPL-MCNC: 36 UG/DL (ref 38–106)
VIT C SERPL-MCNC: 2 MG/L (ref 2–19)
WBC # BLD AUTO: 7.83 K/UL

## 2018-12-26 PROCEDURE — 25000003 PHARM REV CODE 250: Performed by: HOSPITALIST

## 2018-12-26 PROCEDURE — 25000003 PHARM REV CODE 250: Performed by: STUDENT IN AN ORGANIZED HEALTH CARE EDUCATION/TRAINING PROGRAM

## 2018-12-26 PROCEDURE — 83735 ASSAY OF MAGNESIUM: CPT

## 2018-12-26 PROCEDURE — 92526 ORAL FUNCTION THERAPY: CPT

## 2018-12-26 PROCEDURE — 82728 ASSAY OF FERRITIN: CPT

## 2018-12-26 PROCEDURE — 80069 RENAL FUNCTION PANEL: CPT

## 2018-12-26 PROCEDURE — 36415 COLL VENOUS BLD VENIPUNCTURE: CPT

## 2018-12-26 PROCEDURE — 80069 RENAL FUNCTION PANEL: CPT | Mod: 91

## 2018-12-26 PROCEDURE — 63600175 PHARM REV CODE 636 W HCPCS: Performed by: STUDENT IN AN ORGANIZED HEALTH CARE EDUCATION/TRAINING PROGRAM

## 2018-12-26 PROCEDURE — 85025 COMPLETE CBC W/AUTO DIFF WBC: CPT

## 2018-12-26 PROCEDURE — 84100 ASSAY OF PHOSPHORUS: CPT

## 2018-12-26 PROCEDURE — 11000001 HC ACUTE MED/SURG PRIVATE ROOM

## 2018-12-26 PROCEDURE — 99233 SBSQ HOSP IP/OBS HIGH 50: CPT | Mod: ,,, | Performed by: HOSPITALIST

## 2018-12-26 PROCEDURE — 99233 PR SUBSEQUENT HOSPITAL CARE,LEVL III: ICD-10-PCS | Mod: ,,, | Performed by: HOSPITALIST

## 2018-12-26 PROCEDURE — 83540 ASSAY OF IRON: CPT

## 2018-12-26 RX ORDER — DEXTROSE MONOHYDRATE 50 MG/ML
INJECTION, SOLUTION INTRAVENOUS CONTINUOUS
Status: ACTIVE | OUTPATIENT
Start: 2018-12-26 | End: 2018-12-27

## 2018-12-26 RX ADMIN — FOLIC ACID 1 MG: 5 INJECTION, SOLUTION INTRAMUSCULAR; INTRAVENOUS; SUBCUTANEOUS at 08:12

## 2018-12-26 RX ADMIN — AMLODIPINE BESYLATE 10 MG: 10 TABLET ORAL at 08:12

## 2018-12-26 RX ADMIN — HEPARIN SODIUM 5000 UNITS: 5000 INJECTION, SOLUTION INTRAVENOUS; SUBCUTANEOUS at 06:12

## 2018-12-26 RX ADMIN — DEXTROSE: 5 SOLUTION INTRAVENOUS at 08:12

## 2018-12-26 RX ADMIN — HEPARIN SODIUM 5000 UNITS: 5000 INJECTION, SOLUTION INTRAVENOUS; SUBCUTANEOUS at 01:12

## 2018-12-26 RX ADMIN — HEPARIN SODIUM 5000 UNITS: 5000 INJECTION, SOLUTION INTRAVENOUS; SUBCUTANEOUS at 08:12

## 2018-12-26 RX ADMIN — TRAZODONE HYDROCHLORIDE 50 MG: 50 TABLET ORAL at 08:12

## 2018-12-26 RX ADMIN — THIAMINE HYDROCHLORIDE 100 MG: 100 INJECTION, SOLUTION INTRAMUSCULAR; INTRAVENOUS at 08:12

## 2018-12-26 NOTE — PLAN OF CARE
Problem: Adult Inpatient Plan of Care  Goal: Plan of Care Review  Outcome: Ongoing (interventions implemented as appropriate)  Pt in rm with no s/s of distress. Bed alarm set. Pt TF at goal of 35ml/hr with residual overnight of only 75. Pt given water flush of 400 mls overnight.  Pt has call bell within reach. Bed in low position and wheels locked. Will continue to monitor.     Problem: Skin Injury Risk Increased  Goal: Skin Health and Integrity  Outcome: Ongoing (interventions implemented as appropriate)  Pt has no new s/s of skin breakdown this shift. Pt turned Q2h throughout shift. Will continue to monitor.

## 2018-12-26 NOTE — PT/OT/SLP PROGRESS
Speech Language Pathology Treatment    Patient Name:  Cesilia Delgado   MRN:  [id number]  Admitting Diagnosis: Hypernatremia    Recommendations:                 General Recommendations:  Dysphagia therapy  Diet recommendations:  NPO, Liquid Diet Level: Clears   Aspiration Precautions: 1 sip at a time, Assistance with all PO intake, Continue alternate means of nutrition, Feed only when awake/alert, HOB to 90 degrees, Monitor for s/s of aspiration, Remain upright 30 minutes post all PO inake, Small sips and Standard aspiration precautions   General Precautions: Standard, fall  Communication strategies:  provide increased time to answer and go to room if call light pushed    Subjective     Patient awake but confused during session  Communicated with nurse prior to session     Pain/Comfort:  · Pain Rating 1: 0/10  · Pain Rating Post-Intervention 1: 0/10    Objective:     Has the patient been evaluated by SLP for swallowing?   Yes  Keep patient NPO? No   Current Respiratory Status: nasal cannula      Patient seen for continued swallow assessment. She was sitting up in bed with NG tube in place during session. Patient with impaired attention during session, requiring constant verbal and tactile cueing to attend to PO trials. Patient oriented only to self during session and restless in bed. She tolerated trials of thin liquid x6 (via straw) and puree x3 (tsp applesauce) with no overt signs of aspiration; however, patient exhibited poor oral acceptance with all trials and displayed oral holding with puree trials. Patient eventually cleared pureed bolus with a timely swallow and adequate laryngeal elevation, but remains unsafe for a non-liquid diet at this point 2' to impaired cognition and oral dysphagia. ST will continued to f/u with patient for appropriateness of non-liquid diet initiation. SLP educated patient regarding diet recs, but she indicated no understanding. Patient left in bed with call light within  reach.    Assessment:     Cesilia Delgado is a 88 y.o. female with an SLP diagnosis of Dysphagia.     Goals:   Multidisciplinary Problems     SLP Goals        Problem: SLP Goal    Goal Priority Disciplines Outcome   SLP Goal     SLP Ongoing (interventions implemented as appropriate)   Description:  Goals expected to be met by 12/31/18:  1. Pt will participate in BSS to determine least restrictive diet.-  MET 12/24/18  2. Pt will tolerate clear liquids PO diet with no overt s/s of aspiration.  3. Pt will participate in advanced diet trials with no overt s/s of aspiration to determine safest/least restrictive PO diet                        Plan:     · Patient to be seen:  4 x/week   · Plan of Care expires:  01/23/19  · Plan of Care reviewed with:  patient   · SLP Follow-Up:  Yes       Discharge recommendations:  nursing facility, skilled   Barriers to Discharge:  Level of Skilled Assistance Needed     Time Tracking:     SLP Treatment Date:   12/26/18  Speech Start Time:  1103  Speech Stop Time:  1118     Speech Total Time (min):  15 min    Billable Minutes: Treatment Swallowing Dysfunction 15    PRABHJOT Barth-SLP  Speech-Language Pathology  Pager: [phone]   12/26/2018

## 2018-12-26 NOTE — PLAN OF CARE
Problem: Adult Inpatient Plan of Care  Goal: Plan of Care Review  Outcome: Ongoing (interventions implemented as appropriate)  Pt free from any injury or falls, VSS, skin intact. Turned every 2 hours to prevent any skin breakdown. Tube feeds tolerated with less than 40ml residuals. 400cc water bolus given every  4 hours. Appeared to be in no pain or discomfort. Will continue to monitor.

## 2018-12-26 NOTE — PROGRESS NOTES
Progress Note  Hospital Medicine    Admit Date: 12/22/2018  Length of Stay:  LOS: 3 days     SUBJECTIVE:         Follow-up For:  Hypernatremia    HPI/Interval history (See H&P for complete P,F,SHx) :     AAOX 1. sodium at 149. poor oral intake. continued on clear liquids. needs assistance with feeding    Review of Systems: List if applicable  Unable to perform ROS: encephaloathic - AAOX 1         OBJECTIVE:     Vital Signs Range (Last 24H):  Temp:  [97.3 °F (36.3 °C)-97.6 °F (36.4 °C)]   Pulse:  [81-94]   Resp:  [11-22]   BP: (138-162)/(63-90)   SpO2:  [99 %-100 %]     Physical Exam:  Physical Exam   Constitutional: No distress.   Severely frail & thin appearing elderly woman   HENT:   Head: Normocephalic and atraumatic.   Facial muscle waisting    Eyes: No scleral icterus.    Neck: Normal range of motion. Neck supple. No JVD present. No thyromegaly present. NGT insitu   Cardiovascular: Normal rate, regular rhythm and normal heart sounds.   Pulmonary/Chest: Effort normal and breath sounds normal. No stridor. No respiratory distress. She has no wheezes. She has no rales.   Abdominal: Soft. Bowel sounds are normal. She exhibits no distension. There is no tenderness. There is no guarding.   Musculoskeletal: She exhibits no edema, tenderness or deformity.   L. Elbow healing bruise    Neurological: She is alert.   Oriented to self;  Follows simple commands  ,Speaks in nonsensical phrases    Skin: Skin is warm and dry. She is not diaphoretic.   Psychiatric: She has a normal mood and affect.   Nursing note and vitals reviewed.            Medications:  Medication list was reviewed and changes noted under Assessment/Plan.      Current Facility-Administered Medications:     amLODIPine tablet 10 mg, 10 mg, Oral, Daily, MATTHEW Mcgraw, 10 mg at 12/26/18 0812    bisacodyl suppository 10 mg, 10 mg, Rectal, Daily PRN, Neda Snider, NP    calcium-vitamin D3 500 mg(1,250mg) -200 unit per tablet 1 tablet, 1 tablet, Per  NG tube, Once, MATTHEW Mcgraw    folic acid 1 mg in dextrose 5 % 100 mL IVPB, 1 mg, Intravenous, Daily, Zuleika Oneal MD, Last Rate: 100 mL/hr at 12/26/18 0811, 1 mg at 12/26/18 0811    heparin (porcine) injection 5,000 Units, 5,000 Units, Subcutaneous, Q8H, Keri Bangura MD, 5,000 Units at 12/26/18 0600    sodium chloride 0.9% flush 3 mL, 3 mL, Intravenous, PRN, Keri Bangura MD    thiamine (B-1) 100 mg in dextrose 5 % 50 mL IVPB, 100 mg, Intravenous, Daily, Zuleika Oneal MD, Last Rate: 12.5 mL/hr at 12/26/18 0811, 100 mg at 12/26/18 0811    traZODone tablet 50 mg, 50 mg, Oral, QHS, Keri Bangura MD, 50 mg at 12/25/18 2023    bisacodyl, sodium chloride 0.9%    Laboratory/Diagnostic Data:  Reviewed and noted in plan where applicable- Please see chart for full lab data.    Recent Labs   Lab 12/24/18  0451 12/25/18  0455 12/26/18  0423   WBC 10.67 11.22 7.83   HGB 10.8* 10.4* 9.5*   HCT 33.7* 31.0* 28.4*    114* 145*       Recent Labs   Lab 12/22/18  2301 12/23/18  0118  12/24/18  0934  12/25/18  0455  12/25/18  1905 12/26/18  0001 12/26/18  0423   * 174*   < >  --    < > 155*   < > 149* 149* 149*   K 5.8* 5.0   < >  --    < > 3.6   < > 3.8 3.5 3.5   CL >130* >130*   < >  --    < > 128*   < > 124* 122* 121*   CO2 19* 20*   < >  --    < > 20*   < > 16* 19* 21*   * 112*   < >  --    < > 56*   < > 47* 46* 44*   CREATININE 5.6* 5.6*   < >  --    < > 2.8*   < > 2.5* 2.5* 2.5*   * 108   < >  --    < > 88   < > 88 103 92   CALCIUM 11.0* 11.0*   < >  --    < > 8.9   < > 9.0 9.0 9.0   MG 3.9* 3.4*   < > 2.0  --  1.8  --   --   --  1.9   PHOS  --   --    < >  --    < > 3.0  3.0   < > 3.2 2.9 3.0  2.9   LIPASE 212* 198*  --   --   --   --   --   --   --   --     < > = values in this interval not displayed.       Recent Labs   Lab 12/22/18  2301 12/23/18  0118  12/25/18  1905 12/26/18  0001 12/26/18  0423   ALKPHOS 66 63  --   --   --   --    ALT 9* 7*  --   --   --   --    AST 22  12  --   --   --   --    ALBUMIN 3.8 3.7   < > 2.6* 2.4* 2.4*   PROT 8.8* 8.1  --   --   --   --    BILITOT 0.8 0.8  --   --   --   --     < > = values in this interval not displayed.        Microbiology labs for the last week  Microbiology Results (last 7 days)     Procedure Component Value Units Date/Time    Urine culture [[id number]] Collected:  12/23/18 1731    Order Status:  Sent Specimen:  Urine, Catheterized Updated:  12/23/18 1732           Imaging Results          X-Ray Abdomen Flat And Erect (Final result)  Result time 12/23/18 00:15:32    Final result by Zay Hernadez MD (12/23/18 00:15:32)                 Impression:      Nonobstructed bowel-gas pattern.      Electronically signed by: Zay Hernadez MD  Date:    12/23/2018  Time:    00:15             Narrative:    EXAMINATION:  XR ABDOMEN FLAT AND ERECT    CLINICAL HISTORY:  Constipation, unspecified    TECHNIQUE:  Flat and erect AP views of the abdomen were preformed.    COMPARISON:  None    FINDINGS:  Costochondral calcifications project over the upper abdomen.  Bowel gas pattern is non-obstructive.  No evidence for pneumoperitoneum.  Partially visualized fixation hardware at the right hip.                               CT Head Without Contrast (Final result)  Result time 12/23/18 00:26:39    Final result by Zay Hernadez MD (12/23/18 00:26:39)                 Impression:      1. No evidence of acute intracranial hemorrhage or major vascular distribution infarct.  2. Remote right occipital infarct.  Remote lacunar infarct right basal ganglia.  3. Chronic microvascular ischemic change.  4. Generalized cerebral volume loss.    Electronically signed by resident: Shaun Juan  Date:    12/22/2018  Time:    23:59    Electronically signed by: Zay Hernadez MD  Date:    12/23/2018  Time:    00:26             Narrative:    EXAMINATION:  CT HEAD WITHOUT CONTRAST    CLINICAL HISTORY:  Confusion/delirium, altered LOC, unexplained    TECHNIQUE:  Low  "dose axial images were obtained through the head.  Coronal and sagittal reformations were also performed. Contrast was not administered.    COMPARISON:  MRI brain 06/28/2016, CT head 06/27/2016    FINDINGS:  Age-appropriate generalized cerebral volume loss with compensatory widening of the sulci and ventricular system.  No evidence of hydrocephalus.  No extra-axial blood or fluid collections.    Encephalomalacia of the right paramedian occipital lobe compatible with remote infarct.  Remote lacunar infarct right basal ganglia, unchanged.  Patchy and confluent hypoattenuation of the supratentorial white matter consistent with chronic microvascular ischemic change.    No evidence of acute intracranial hemorrhage or major vascular distribution infarct.  No parenchymal mass or edema.    No calvarial fracture.  Mastoid air cells and paranasal sinuses are clear.                                Estimated body mass index is 13.99 kg/m² as calculated from the following:    Height as of this encounter: 5' 2" (1.575 m).    Weight as of this encounter: 34.7 kg (76 lb 8 oz).    I & O (Last 24H):    Intake/Output Summary (Last 24 hours) at 12/26/2018 1107  Last data filed at 12/26/2018 0811  Gross per 24 hour   Intake 1400 ml   Output 225 ml   Net 1175 ml       Estimated Creatinine Clearance: 8.5 mL/min (A) (based on SCr of 2.5 mg/dL (H)).    ASSESSMENT/PLAN:     Active Problems:    Active Hospital Problems    Diagnosis  POA    *Hypernatremia [E87.0]    Na of 174 upon admission  -serial BMPs q 6hrly  -suspect 2/2 dehydration from advanced dementia  -patient down to Na of 152 on d5w at 75 cc/hr   will transition to 400 cc free water boluses   TID(equivalent to 50 cc/hr x 24hrs)   sodium at 148      Yes    Goals of care, counseling/discussion [Z71.89]full code for now   Not Applicable    Insomnia [G47.00]on trazodone  Yes    Dysphagia [R13.10]s/p SLP Evaluation, likely encephalopathy. started clear liquids   NGT placed 12/23 and " started trickle feeds  - NGT feeds advanced as tolerated   - SLP recommended liquid diet and keeping NGT for now. TF rate (of Isosource 1.5) to 35 mL/hr   - poor oral intake. continued on clear liquids. needs assistance with feeding     Unknown    Electrolyte disturbance [E87.8]as above  Unknown    Caregiver burden [Z63.6]Patient's son  four months ago and family noticed a decline in her mental status since then. NH options for placement  Not Applicable    Mood disturbance [R45.86] R/o depression post sons's demise  Unknown    Acute encephalopathy [G93.40]oriented to self. likely metabolic enecophalopathy. improving .on thiamine, folate and vitamin D for malnutrition, dietitian consulted for diet, currently on tricklefeeding by NGT, vitamins levels in process.afebrile, non elevated WBC, no UTI on UA . CT head - No evidence of acute intracranial hemorrhage or major vascular distribution infarct. Remote right occipital infarct    Unknown    Malnutrition [E46]Low pre-albumin/ SLP eval. nutrition consulted   Yes     Chronic    Stage 4 chronic kidney disease [N18.4]  Yes     Chronic    ODILIA (acute kidney injury) [N17.9]on CKD - Cr 5.6--> 2.5  improving with IV hydration. monitor    anemia - Hb 13--> 9.5 microcytic.  likely hemoconcentration. ferrokinetics   Thrombocytopenia 114. monitor  Falls - 2 episodes at home no repoted LOC. obtain CPK    Unknown    Essential hypertension [I10]controlled on amlodipine  Yes     Chronic      Resolved Hospital Problems   No resolved problems to display.         Disposition- NH vs SNF    DVT prophylaxis addressed with: AMBER Brandt MD  Attending Staff Physician  [facility]  pager- [phone]  Spectralfbn - [phone]

## 2018-12-26 NOTE — PLAN OF CARE
Problem: SLP Goal  Goal: SLP Goal  Goals expected to be met by 12/31/18:  1. Pt will participate in BSS to determine least restrictive diet.-  MET 12/24/18  2. Pt will tolerate clear liquids PO diet with no overt s/s of aspiration.  3. Pt will participate in advanced diet trials with no overt s/s of aspiration to determine safest/least restrictive PO diet         Patient seen for continued swallow assessment. SLP recommending continued CLEAR LIQUID DIET with ALTERNATE MEANS NUTRITION/HYDRATION/MEDICATION. Patient with oral dysphagia and poor acceptance of non-liquid trials 2' to impaired cognition. She does not exhibit any overt signs of aspiration, but is unsafe for a non-liquid diet at this point. ST will continue to follow to determine appropriateness of diet upgrade.     PRABHJOT Barth-SLP  Speech-Language Pathology  Pager: [phone]

## 2018-12-27 LAB
ALBUMIN SERPL BCP-MCNC: 2.1 G/DL
ALBUMIN SERPL BCP-MCNC: 2.3 G/DL
ALBUMIN SERPL BCP-MCNC: 2.3 G/DL
ALBUMIN SERPL BCP-MCNC: 2.4 G/DL
ANION GAP SERPL CALC-SCNC: 5 MMOL/L
ANION GAP SERPL CALC-SCNC: 5 MMOL/L
ANION GAP SERPL CALC-SCNC: 6 MMOL/L
ANION GAP SERPL CALC-SCNC: 7 MMOL/L
BASOPHILS # BLD AUTO: 0.02 K/UL
BASOPHILS NFR BLD: 0.3 %
BUN SERPL-MCNC: 43 MG/DL
BUN SERPL-MCNC: 43 MG/DL
BUN SERPL-MCNC: 45 MG/DL
BUN SERPL-MCNC: 46 MG/DL
CALCIUM SERPL-MCNC: 8.8 MG/DL
CALCIUM SERPL-MCNC: 8.9 MG/DL
CALCIUM SERPL-MCNC: 9.3 MG/DL
CALCIUM SERPL-MCNC: 9.3 MG/DL
CHLORIDE SERPL-SCNC: 111 MMOL/L
CHLORIDE SERPL-SCNC: 111 MMOL/L
CHLORIDE SERPL-SCNC: 116 MMOL/L
CHLORIDE SERPL-SCNC: 119 MMOL/L
CO2 SERPL-SCNC: 19 MMOL/L
CO2 SERPL-SCNC: 23 MMOL/L
CO2 SERPL-SCNC: 23 MMOL/L
CO2 SERPL-SCNC: 25 MMOL/L
CREAT SERPL-MCNC: 2.1 MG/DL
CREAT SERPL-MCNC: 2.3 MG/DL
CREAT SERPL-MCNC: 2.3 MG/DL
CREAT SERPL-MCNC: 3.9 MG/DL
DIFFERENTIAL METHOD: ABNORMAL
EOSINOPHIL # BLD AUTO: 0.2 K/UL
EOSINOPHIL NFR BLD: 2.1 %
ERYTHROCYTE [DISTWIDTH] IN BLOOD BY AUTOMATED COUNT: 18.6 %
EST. GFR  (AFRICAN AMERICAN): 11.2 ML/MIN/1.73 M^2
EST. GFR  (AFRICAN AMERICAN): 21.2 ML/MIN/1.73 M^2
EST. GFR  (AFRICAN AMERICAN): 21.2 ML/MIN/1.73 M^2
EST. GFR  (AFRICAN AMERICAN): 23.7 ML/MIN/1.73 M^2
EST. GFR  (NON AFRICAN AMERICAN): 18.4 ML/MIN/1.73 M^2
EST. GFR  (NON AFRICAN AMERICAN): 18.4 ML/MIN/1.73 M^2
EST. GFR  (NON AFRICAN AMERICAN): 20.6 ML/MIN/1.73 M^2
EST. GFR  (NON AFRICAN AMERICAN): 9.7 ML/MIN/1.73 M^2
GLUCOSE SERPL-MCNC: 116 MG/DL
GLUCOSE SERPL-MCNC: 118 MG/DL
GLUCOSE SERPL-MCNC: 123 MG/DL
GLUCOSE SERPL-MCNC: 127 MG/DL
HCT VFR BLD AUTO: 28.3 %
HGB BLD-MCNC: 9.3 G/DL
IMM GRANULOCYTES # BLD AUTO: 0.03 K/UL
IMM GRANULOCYTES NFR BLD AUTO: 0.4 %
LYMPHOCYTES # BLD AUTO: 1.1 K/UL
LYMPHOCYTES NFR BLD: 14.9 %
MAGNESIUM SERPL-MCNC: 1.8 MG/DL
MCH RBC QN AUTO: 24.9 PG
MCHC RBC AUTO-ENTMCNC: 32.9 G/DL
MCV RBC AUTO: 76 FL
MONOCYTES # BLD AUTO: 0.5 K/UL
MONOCYTES NFR BLD: 7.2 %
NEUTROPHILS # BLD AUTO: 5.5 K/UL
NEUTROPHILS NFR BLD: 75.1 %
NRBC BLD-RTO: 1 /100 WBC
PHOSPHATE SERPL-MCNC: 2.2 MG/DL
PHOSPHATE SERPL-MCNC: 2.5 MG/DL
PHOSPHATE SERPL-MCNC: 2.6 MG/DL
PHOSPHATE SERPL-MCNC: 2.7 MG/DL
PHOSPHATE SERPL-MCNC: 3.1 MG/DL
PLATELET # BLD AUTO: 139 K/UL
PMV BLD AUTO: ABNORMAL FL
POCT GLUCOSE: 117 MG/DL (ref 70–110)
POCT GLUCOSE: 143 MG/DL (ref 70–110)
POTASSIUM SERPL-SCNC: 3.7 MMOL/L
POTASSIUM SERPL-SCNC: 3.8 MMOL/L
POTASSIUM SERPL-SCNC: 3.9 MMOL/L
POTASSIUM SERPL-SCNC: 4.1 MMOL/L
RBC # BLD AUTO: 3.74 M/UL
SODIUM SERPL-SCNC: 140 MMOL/L
SODIUM SERPL-SCNC: 141 MMOL/L
SODIUM SERPL-SCNC: 144 MMOL/L
SODIUM SERPL-SCNC: 145 MMOL/L
WBC # BLD AUTO: 7.26 K/UL

## 2018-12-27 PROCEDURE — 83735 ASSAY OF MAGNESIUM: CPT

## 2018-12-27 PROCEDURE — 99232 PR SUBSEQUENT HOSPITAL CARE,LEVL II: ICD-10-PCS | Mod: ,,, | Performed by: HOSPITALIST

## 2018-12-27 PROCEDURE — 63600175 PHARM REV CODE 636 W HCPCS: Performed by: STUDENT IN AN ORGANIZED HEALTH CARE EDUCATION/TRAINING PROGRAM

## 2018-12-27 PROCEDURE — 97110 THERAPEUTIC EXERCISES: CPT

## 2018-12-27 PROCEDURE — 97530 THERAPEUTIC ACTIVITIES: CPT

## 2018-12-27 PROCEDURE — 92526 ORAL FUNCTION THERAPY: CPT

## 2018-12-27 PROCEDURE — G8987 SELF CARE CURRENT STATUS: HCPCS | Mod: CN

## 2018-12-27 PROCEDURE — G8988 SELF CARE GOAL STATUS: HCPCS | Mod: CL

## 2018-12-27 PROCEDURE — 25000003 PHARM REV CODE 250: Performed by: STUDENT IN AN ORGANIZED HEALTH CARE EDUCATION/TRAINING PROGRAM

## 2018-12-27 PROCEDURE — 85025 COMPLETE CBC W/AUTO DIFF WBC: CPT

## 2018-12-27 PROCEDURE — 80069 RENAL FUNCTION PANEL: CPT | Mod: 91

## 2018-12-27 PROCEDURE — 84100 ASSAY OF PHOSPHORUS: CPT

## 2018-12-27 PROCEDURE — 11000001 HC ACUTE MED/SURG PRIVATE ROOM

## 2018-12-27 PROCEDURE — 99232 SBSQ HOSP IP/OBS MODERATE 35: CPT | Mod: ,,, | Performed by: HOSPITALIST

## 2018-12-27 PROCEDURE — 97535 SELF CARE MNGMENT TRAINING: CPT

## 2018-12-27 PROCEDURE — 36415 COLL VENOUS BLD VENIPUNCTURE: CPT

## 2018-12-27 RX ADMIN — TRAZODONE HYDROCHLORIDE 50 MG: 50 TABLET ORAL at 11:12

## 2018-12-27 RX ADMIN — HEPARIN SODIUM 5000 UNITS: 5000 INJECTION, SOLUTION INTRAVENOUS; SUBCUTANEOUS at 05:12

## 2018-12-27 RX ADMIN — HEPARIN SODIUM 5000 UNITS: 5000 INJECTION, SOLUTION INTRAVENOUS; SUBCUTANEOUS at 02:12

## 2018-12-27 RX ADMIN — FOLIC ACID 1 MG: 5 INJECTION, SOLUTION INTRAMUSCULAR; INTRAVENOUS; SUBCUTANEOUS at 10:12

## 2018-12-27 RX ADMIN — HEPARIN SODIUM 5000 UNITS: 5000 INJECTION, SOLUTION INTRAVENOUS; SUBCUTANEOUS at 11:12

## 2018-12-27 RX ADMIN — AMLODIPINE BESYLATE 10 MG: 10 TABLET ORAL at 10:12

## 2018-12-27 RX ADMIN — THIAMINE HYDROCHLORIDE 100 MG: 100 INJECTION, SOLUTION INTRAMUSCULAR; INTRAVENOUS at 11:12

## 2018-12-27 NOTE — PT/OT/SLP PROGRESS
Speech Language Pathology Treatment    Patient Name:  Cesilia Delgado   MRN:  [id number]  Admitting Diagnosis: Hypernatremia    Recommendations:                 General Recommendations:  Dysphagia therapy  Diet recommendations:  Puree, Liquid Diet Level: Thin   Aspiration Precautions: 1 bite/sip at a time, Assistance with all PO intake, Continue alternate means of nutrition, Feed only when awake/alert, HOB to 90 degrees, Monitor for s/s of aspiration, Monitor for pocketing, Remain upright 30 minutes post all PO inake, Small sips/bites and Standard aspiration precautions   General Precautions: Standard, fall, pureed diet, aspiration  Communication strategies:  provide increased time to answer and go to room if call light pushed    Subjective     Patient awake and cooperative during session  Communicated with nurse prior to session    Pain/Comfort:  Pain Rating 1: 0/10  Pain Rating Post-Intervention 1: 0/10    Objective:     Has the patient been evaluated by SLP for swallowing?   Yes  Keep patient NPO? No   Current Respiratory Status: nasal cannula      Patient seen for continued swallow assessment. She was sitting up in bed with NG tube in place during session. Patient more interactive and responsive to commands during this session than prior sessions. She produced a strong volitional cough and throat clear prior to PO trials. She tolerated thin liquids x6 (via straw) with no overt signs of aspiration. She exhibited mild oral holding with thin liquids, but swallow was timely and she displayed no coughing/throat clearing. Puree tirals x5 (via tsp) resulted in no overt signs of aspiration. She did exhibit extended oral holding (~3-4 sec) with these trials, but she initiated swallow independently. Patient with good oral clearance of puree trials. Patient appropriate for diet upgrade to puree with 1:1assistance with meals and monitoring for pocketing. SLP educated patient regarding diet recs, but she did not exhibit  understanding. Patient left in bed with call light within reach. Diet recs communicated to treatment team.     Assessment:     Cesilia Delgado is a 88 y.o. female with an SLP diagnosis of Dysphagia.     Goals:   Multidisciplinary Problems     SLP Goals        Problem: SLP Goal    Goal Priority Disciplines Outcome   SLP Goal     SLP Ongoing (interventions implemented as appropriate)   Description:  Goals expected to be met by 12/31/18:  1. Pt will participate in BSS to determine least restrictive diet.-  MET 12/24/18  2. Pt will tolerate puree diet with no overt s/s of aspiration.  3. Pt will participate in advanced diet trials with no overt s/s of aspiration to determine safest/least restrictive PO diet                         Plan:     · Patient to be seen:  3 x/week   · Plan of Care expires:  01/23/19  · Plan of Care reviewed with:  patient   · SLP Follow-Up:  Yes       Discharge recommendations:  nursing facility, skilled   Barriers to Discharge:  Level of Skilled Assistance Needed     Time Tracking:     SLP Treatment Date:   12/27/18  Speech Start Time:  1104  Speech Stop Time:  1114     Speech Total Time (min):  10 min    Billable Minutes: Treatment Swallowing Dysfunction 10    PRABHJOT Barth-SLP  Speech-Language Pathology  Pager: [phone]   12/27/2018

## 2018-12-27 NOTE — PT/OT/SLP PROGRESS
Physical Therapy Treatment    Patient Name:  Cesilia Delgado   MRN:  [id number]    Recommendations:     Discharge Recommendations:  nursing facility, skilled(long term)   Discharge Equipment Recommendations: none   Barriers to discharge: Decreased caregiver support (due to current LOF)    Assessment:     Cesilia Delgado is a 88 y.o. female admitted with a medical diagnosis of Hypernatremia.  She presents with the following impairments/functional limitations:  weakness, impaired endurance, impaired self care skills, impaired functional mobilty, gait instability, impaired balance, impaired cognition, decreased coordination, decreased upper extremity function, decreased lower extremity function, decreased safety awareness . Pt marty session well w/ good participation. She continues to require MaxA for bed mobility and transfers. She also remains unable to transfer OOB due to the above mentioned deficits. Pt will continue PT POC. Pt will require 24hour assistance upon D/C.    Rehab Prognosis: Fair; patient would benefit from acute skilled PT services to address these deficits and reach maximum level of function.    Recent Surgery: * No surgery found *      Plan:     During this hospitalization, patient to be seen 4 x/week to address the identified rehab impairments via gait training, therapeutic activities, therapeutic exercises, neuromuscular re-education and progress toward the following goals:    · Plan of Care Expires:  01/25/19    Subjective     Chief Complaint: none  Patient/Family Comments/goals: return to PLOF  Pain/Comfort:  · Pain Rating 1: 0/10      Objective:     Communicated with nursing prior to session.  Patient found all lines intact and call button in reach pressure relief boots, NG tube, peripheral IV, telemetry  upon PT entry to room.     General Precautions: Standard, fall, aspiration, pureed diet   Orthopedic Precautions:N/A   Braces: N/A     Functional Mobility:  · Bed Mobility:    · Supine<>sit on bed w/  MaxA for trunk and BLE, inc'd time and encouragement required  · HOB elevated and w/ side rail  · Transfers:   · Sit<>stand to/from EOB x2 trials  · Trial 1 w/ RW and MaxA(X2) to rise, pt unable to fully rise  · Trial 2 w/o AD w/ MaxA(x1)  · Cueing for hand/foot placement and forward lean  · Gait:   · Unable at this time  · Balance:   · Sitting balance EOB x15min  · SBA for static sit  · CGA for dynamic activity including reaching/high five x5 trials each hand w/ crossing midline      AM-PAC 6 CLICK MOBILITY  Turning over in bed (including adjusting bedclothes, sheets and blankets)?: 2  Sitting down on and standing up from a chair with arms (e.g., wheelchair, bedside commode, etc.): 2  Moving from lying on back to sitting on the side of the bed?: 2  Moving to and from a bed to a chair (including a wheelchair)?: 1  Need to walk in hospital room?: 1  Climbing 3-5 steps with a railing?: 1  Basic Mobility Total Score: 9       Therapeutic Activities and Exercises:   Supine BLE therex x10 reps (SLR, ABd/ADd, AP) w/ AAROM as needed    Patient left with bed in chair position with all lines intact, call button in reach and chair alarm on..    GOALS:   Multidisciplinary Problems     Physical Therapy Goals        Problem: Physical Therapy Goal    Goal Priority Disciplines Outcome Goal Variances Interventions   Physical Therapy Goal     PT, PT/OT Ongoing (interventions implemented as appropriate)     Description:  Goals to be met by: 2019      Patient will increase functional independence with mobility by performin. Supine to sit with Stand-by Assistance  2. Sit to supine with Stand-by Assistance  3. Sit to stand transfer with Minimum assistance   4. Bed to chair transfer with Minimal Assistance using LRAD or no AD   5. Gait  x 10 feet with Minimal Assistance using LRAD or no AD.                       Time Tracking:     PT Received On: 18  PT Start Time: 957     PT Stop Time:   PT Total Time (min): 30 min      Billable Minutes: Therapeutic Activity 20, Therapeutic Exercise 10 and Total Time 30    Treatment Type: Treatment  PT/PTA: PT     PTA Visit Number: 0     Susannah Marvin, MATTHEW  12/27/2018

## 2018-12-27 NOTE — PLAN OF CARE
Problem: SLP Goal  Goal: SLP Goal  Goals expected to be met by 12/31/18:  1. Pt will participate in BSS to determine least restrictive diet.-  MET 12/24/18  2. Pt will tolerate puree diet with no overt s/s of aspiration.  3. Pt will participate in advanced diet trials with no overt s/s of aspiration to determine safest/least restrictive PO diet         Patient seen for continued swallow assessment. SLP recommending PUREE DIET/THIN LIQUIDS.    Shaun Reyna -SLP  Speech-Language Pathology  Pager: [phone]

## 2018-12-27 NOTE — PLAN OF CARE
KYLER called [facility] and spoke to Fátima in admissions ([phone]). She informed this SW that the pt has been accepted and requested updated clinicals.     KYLER sent referral to N for auth.     Joseph Mosqueda, LMSW [facility]  [phone]

## 2018-12-27 NOTE — PLAN OF CARE
Problem: Adult Inpatient Plan of Care  Goal: Plan of Care Review  Outcome: Ongoing (interventions implemented as appropriate)  Alert to self only.Pt shows no s/s of distress or discomfort. Safety measures met. Free from falls and injury. Denies any pain. Able to verbalize all needs. Turned every two hours.Incontinence care provided. Has adequate food and fluid intake. Bed locked and placed in lowest position. Call light within reach.

## 2018-12-27 NOTE — PLAN OF CARE
Problem: Occupational Therapy Goal  Goal: Occupational Therapy Goal  Goals to be met by: 1/15     Patient will increase functional independence with ADLs by performing:    UE Dressing with Set-up Assistance.  LE Dressing with Contact Guard Assistance.  Grooming while seated with Set-up Assistance.  Toileting from toilet with Contact Guard Assistance for hygiene and clothing management.      Outcome: Ongoing (interventions implemented as appropriate)  Goals are still appropriate, continue w/ OT POC.

## 2018-12-27 NOTE — PLAN OF CARE
Problem: Physical Therapy Goal  Goal: Physical Therapy Goal  Goals to be met by: 2019      Patient will increase functional independence with mobility by performin. Supine to sit with Stand-by Assistance  2. Sit to supine with Stand-by Assistance  3. Sit to stand transfer with Minimum assistance   4. Bed to chair transfer with Minimal Assistance using LRAD or no AD   5. Gait  x 10 feet with Minimal Assistance using LRAD or no AD.      Outcome: Ongoing (interventions implemented as appropriate)  LTGs remain appropriate. Pt will continue PT POC.    Susannah Marvin, MATTHEW  2018

## 2018-12-27 NOTE — PROGRESS NOTES
Progress Note  Hospital Medicine    Admit Date: 12/22/2018  Length of Stay:  LOS: 4 days     SUBJECTIVE:         Follow-up For:  Hypernatremia    HPI/Interval history (See H&P for complete P,F,SHx) :     AAOX 2. sodium at 149--> 145 on D5 W. poor oral intake. advanced to puree diet . needs assistance with feeding    Review of Systems: List if applicable  Unable to perform ROS: encephalpathy  improving - AAOX 2 today  denies pain        OBJECTIVE:     Vital Signs Range (Last 24H):  Pulse:  [82-94]   Resp:  [11-15]   BP: (124-155)/(70-87)   SpO2:  [97 %-100 %]     Physical Exam:  Physical Exam   Constitutional: No distress.   Severely frail & thin appearing elderly woman   HENT:   Head: Normocephalic and atraumatic.   Facial muscle waisting    Eyes: No scleral icterus.    Neck: Normal range of motion. Neck supple. No JVD present. No thyromegaly present. NGT insitu   Cardiovascular: Normal rate, regular rhythm and normal heart sounds.   Pulmonary/Chest: Effort normal and breath sounds normal. No stridor. No respiratory distress. She has no wheezes. She has no rales.   Abdominal: Soft. Bowel sounds are normal. She exhibits no distension. There is no tenderness. There is no guarding.   Musculoskeletal: She exhibits no edema, tenderness or deformity.   L. Elbow healing bruise    Neurological: She is alert.   encephalpathy  improving - AAOX 2 today  Skin: Skin is warm and dry. She is not diaphoretic.   Psychiatric: She has a normal mood and affect.   Nursing note and vitals reviewed.            Medications:  Medication list was reviewed and changes noted under Assessment/Plan.      Current Facility-Administered Medications:     amLODIPine tablet 10 mg, 10 mg, Oral, Daily, MATTHEW Mcgraw, 10 mg at 12/26/18 0812    bisacodyl suppository 10 mg, 10 mg, Rectal, Daily PRN, Neda Snider, NP    calcium-vitamin D3 500 mg(1,250mg) -200 unit per tablet 1 tablet, 1 tablet, Per NG tube, Once, MATTHEW Mcgraw     dextrose 5 % infusion, , Intravenous, Continuous, Nabil Brandt MD, Last Rate: 50 mL/hr at 12/26/18 2050    folic acid 1 mg in dextrose 5 % 100 mL IVPB, 1 mg, Intravenous, Daily, Zuleika Oneal MD, Last Rate: 100 mL/hr at 12/26/18 0811, 1 mg at 12/26/18 0811    heparin (porcine) injection 5,000 Units, 5,000 Units, Subcutaneous, Q8H, Keri Bangura MD, 5,000 Units at 12/26/18 2051    sodium chloride 0.9% flush 3 mL, 3 mL, Intravenous, PRN, Keri Bangura MD    thiamine (B-1) 100 mg in dextrose 5 % 50 mL IVPB, 100 mg, Intravenous, Daily, Zuleika Oneal MD, Last Rate: 12.5 mL/hr at 12/26/18 0811, 100 mg at 12/26/18 0811    traZODone tablet 50 mg, 50 mg, Oral, QHS, Keri Bangura MD, 50 mg at 12/26/18 2051    bisacodyl, sodium chloride 0.9%    Laboratory/Diagnostic Data:  Reviewed and noted in plan where applicable- Please see chart for full lab data.    Recent Labs   Lab 12/24/18  0451 12/25/18  0455 12/26/18  0423   WBC 10.67 11.22 7.83   HGB 10.8* 10.4* 9.5*   HCT 33.7* 31.0* 28.4*    114* 145*       Recent Labs   Lab 12/22/18  2301 12/23/18  0118  12/24/18  0934  12/25/18  0455  12/26/18  0423 12/26/18  1157 12/26/18  1740 12/26/18  2336   * 174*   < >  --    < > 155*   < > 149* 148* 149* 145   K 5.8* 5.0   < >  --    < > 3.6   < > 3.5 3.7 3.6 3.8   CL >130* >130*   < >  --    < > 128*   < > 121* 119* 120* 119*   CO2 19* 20*   < >  --    < > 20*   < > 21* 22* 23 19*   * 112*   < >  --    < > 56*   < > 44* 47* 45* 45*   CREATININE 5.6* 5.6*   < >  --    < > 2.8*   < > 2.5* 2.4* 2.5* 2.3*   * 108   < >  --    < > 88   < > 92 111* 127* 118*   CALCIUM 11.0* 11.0*   < >  --    < > 8.9   < > 9.0 9.3 9.2 8.8   MG 3.9* 3.4*   < > 2.0  --  1.8  --  1.9  --   --   --    PHOS  --   --    < >  --    < > 3.0  3.0   < > 3.0  2.9 2.8 2.7 2.7   LIPASE 212* 198*  --   --   --   --   --   --   --   --   --     < > = values in this interval not displayed.       Recent Labs   Lab  12/22/18  2301 12/23/18  0118  12/26/18  1157 12/26/18  1740 12/26/18  2336   ALKPHOS 66 63  --   --   --   --    ALT 9* 7*  --   --   --   --    AST 22 12  --   --   --   --    ALBUMIN 3.8 3.7   < > 2.5* 2.4* 2.1*   PROT 8.8* 8.1  --   --   --   --    BILITOT 0.8 0.8  --   --   --   --     < > = values in this interval not displayed.        Microbiology labs for the last week  Microbiology Results (last 7 days)     Procedure Component Value Units Date/Time    Urine culture [[id number]] Collected:  12/23/18 1731    Order Status:  Sent Specimen:  Urine, Catheterized Updated:  12/23/18 1732           Imaging Results          X-Ray Abdomen Flat And Erect (Final result)  Result time 12/23/18 00:15:32    Final result by Zay Hernadez MD (12/23/18 00:15:32)                 Impression:      Nonobstructed bowel-gas pattern.      Electronically signed by: Zay Hernadez MD  Date:    12/23/2018  Time:    00:15             Narrative:    EXAMINATION:  XR ABDOMEN FLAT AND ERECT    CLINICAL HISTORY:  Constipation, unspecified    TECHNIQUE:  Flat and erect AP views of the abdomen were preformed.    COMPARISON:  None    FINDINGS:  Costochondral calcifications project over the upper abdomen.  Bowel gas pattern is non-obstructive.  No evidence for pneumoperitoneum.  Partially visualized fixation hardware at the right hip.                               CT Head Without Contrast (Final result)  Result time 12/23/18 00:26:39    Final result by Zay Hernadez MD (12/23/18 00:26:39)                 Impression:      1. No evidence of acute intracranial hemorrhage or major vascular distribution infarct.  2. Remote right occipital infarct.  Remote lacunar infarct right basal ganglia.  3. Chronic microvascular ischemic change.  4. Generalized cerebral volume loss.    Electronically signed by resident: Shaun Juan  Date:    12/22/2018  Time:    23:59    Electronically signed by: Zay Hernadez MD  Date:    12/23/2018  Time:    00:26  "            Narrative:    EXAMINATION:  CT HEAD WITHOUT CONTRAST    CLINICAL HISTORY:  Confusion/delirium, altered LOC, unexplained    TECHNIQUE:  Low dose axial images were obtained through the head.  Coronal and sagittal reformations were also performed. Contrast was not administered.    COMPARISON:  MRI brain 06/28/2016, CT head 06/27/2016    FINDINGS:  Age-appropriate generalized cerebral volume loss with compensatory widening of the sulci and ventricular system.  No evidence of hydrocephalus.  No extra-axial blood or fluid collections.    Encephalomalacia of the right paramedian occipital lobe compatible with remote infarct.  Remote lacunar infarct right basal ganglia, unchanged.  Patchy and confluent hypoattenuation of the supratentorial white matter consistent with chronic microvascular ischemic change.    No evidence of acute intracranial hemorrhage or major vascular distribution infarct.  No parenchymal mass or edema.    No calvarial fracture.  Mastoid air cells and paranasal sinuses are clear.                                Estimated body mass index is 13.99 kg/m² as calculated from the following:    Height as of this encounter: 5' 2" (1.575 m).    Weight as of this encounter: 34.7 kg (76 lb 8 oz).    I & O (Last 24H):    Intake/Output Summary (Last 24 hours) at 12/27/2018 0529  Last data filed at 12/26/2018 1915  Gross per 24 hour   Intake 2010 ml   Output 80 ml   Net 1930 ml       Estimated Creatinine Clearance: 9.3 mL/min (A) (based on SCr of 2.3 mg/dL (H)).    ASSESSMENT/PLAN:     Active Problems:    Active Hospital Problems    Diagnosis  POA    *Hypernatremia [E87.0]    Na of 174 upon admission  -serial BMPs q 6hrly  -suspect 2/2 dehydration from advanced dementia  -patient down to Na of 152 on d5w at 75 cc/hr   will transition to 400 cc free water boluses   TID(equivalent to 50 cc/hr x 24hrs)    sodium at 149--> 144 on D5 W. poor oral intake. continued on clear liquids. needs assistance with " feeding       Yes    Goals of care, counseling/discussion [Z71.89]full code for now   Not Applicable    Insomnia [G47.00]on trazodone  Yes    Dysphagia [R13.10]s/p SLP Evaluation, likely encephalopathy. started clear liquids   NGT placed  and started trickle feeds  - NGT feeds advanced as tolerated   - SLP recommended liquid diet and keeping NGT for now. TF rate (of Isosource 1.5) to 35 mL/hr   - poor oral intake. advanced to puree diet  needs assistance with feeding     Unknown    Electrolyte disturbance [E87.8]as above  Unknown    Caregiver burden [Z63.6]Patient's son  four months ago and family noticed a decline in her mental status since then. NH options for placement  Not Applicable    Mood disturbance [R45.86] R/o depression post sons's demise  Unknown    Acute encephalopathy [G93.40]encephalpathy  improving - AAOX 2 today. likely metabolic enecophalopathy. improving .on thiamine, folate and vitamin D for malnutrition, dietitian consulted for diet, currently on tricklefeeding by NGT, vitamins levels in process.afebrile, non elevated WBC, no UTI on UA . CT head - No evidence of acute intracranial hemorrhage or major vascular distribution infarct. Remote right occipital infarct    Unknown    Malnutrition [E46]Low pre-albumin/ SLP eval. nutrition consulted On NGT feeding Isosource at 35ml  Yes     Chronic    Stage 4 chronic kidney disease [N18.4]  Yes     Chronic    ODILIA (acute kidney injury) [N17.9]on CKD - Cr 5.6--> 2.5 -->2.3 improving with IV hydration. monitor    anemia - Hb 13--> 9.5 microcytic.  likely hemoconcentration. ferrokinetics   Thrombocytopenia 139 monitor  Falls - 2 episodes at home no repoted LOC. obtain CPK - normal   Unknown    Essential hypertension [I10]controlled on amlodipine  Yes     Chronic      Resolved Hospital Problems   No resolved problems to display.         Disposition- SNF. Daughter in law -POA wants to try SNF    DVT prophylaxis addressed with:  SCD            Nabil Brandt MD  Attending Staff Physician  [facility]  pager- [phone]  [employer] - [phone]

## 2018-12-27 NOTE — PLAN OF CARE
Problem: Adult Inpatient Plan of Care  Goal: Plan of Care Review  Outcome: Ongoing (interventions implemented as appropriate)  Pt alert X 1; unable to express needs.  No s/s of pain/discomfort noted.  400 mL free h2o provided as ordered via NGT.  TF @ 35 mL via NGT.  IVF infusing per order.  Pt turned and repositioned q2h for PI prevention.  Incont of B&B care provided as indicated.  SR on tele; HR 80s.  No acute distress noted over night.  Call light within reach; bed in lowest position with wheels locked.  Will monitor.

## 2018-12-27 NOTE — PT/OT/SLP PROGRESS
Occupational Therapy   Treatment    Name: Cesilia Delgado  MRN: [id number]  Admitting Diagnosis:  Hypernatremia       Recommendations:     Discharge Recommendations: nursing facility, skilled  Discharge Equipment Recommendations:  none  Barriers to discharge:  None    Subjective     Pain/Comfort:  · Pain Rating 1: 0/10  · Pain Rating Post-Intervention 1: 0/10    Objective:     Communicated with: rn prior to session.  Patient found with all lines intact, call button in reach and Peg tube on hold and pressure relief boots, NG tube, peripheral IV, telemetry upon OT entry to room.    General Precautions: Standard, fall   Orthopedic Precautions:N/A   Braces: N/A     Occupational Performance:    Bed Mobility:    · Patient completed Rolling/Turning to Left with  maximal assistance  · Patient completed Rolling/Turning to Right with maximal assistance  · Patient completed Scooting/Bridging with moderate assistance  · Patient completed Supine to Sit with moderate assistance  · Patient completed Sit to Supine with moderate assistance     Functional Mobility/Transfers:  · Patient completed Sit <> Stand Transfer with maximal assistance  with  hand-held assist   · Functional Mobility: Able to take 2-3 steps HOB w/ max assist for foot sliding and balance.     Activities of Daily Living:  · N/A      [facility] 6 Click ADL: 6    Treatment & Education:  -Pt edu on OT role/POC  -Importance of OOB activity with staff assistance  -Safety during functional t/f and mobility  - White board updated  - Multiple self care tasks/functional mobility completed-- assistance level noted above  - All questions/concerns answered within OT scope of practice      Patient left HOB elevated with all lines intact, call button in reach, RN notified and Peg feed restarted.  Education:    Assessment:     Cesilia Delgado is a 88 y.o. female with a medical diagnosis of Hypernatremia.  She presents with the following performance deficits affecting function are weakness,  impaired endurance, impaired self care skills, impaired functional mobilty, impaired balance, visual deficits, impaired cognition, decreased upper extremity function, decreased lower extremity function, decreased safety awareness, impaired cardiopulmonary response to activity.     Rehab Prognosis:  Fair; patient would benefit from acute skilled OT services to address these deficits and reach maximum level of function.       Plan:     Patient to be seen 3 x/week to address the above listed problems via self-care/home management, therapeutic activities, therapeutic exercises  · Plan of Care Expires: 01/25/19  · Plan of Care Reviewed with: patient    This Plan of care has been discussed with the patient who was involved in its development and understands and is in agreement with the identified goals and treatment plan    GOALS:   Multidisciplinary Problems     Occupational Therapy Goals        Problem: Occupational Therapy Goal    Goal Priority Disciplines Outcome Interventions   Occupational Therapy Goal     OT, PT/OT Ongoing (interventions implemented as appropriate)    Description:  Goals to be met by: 1/15     Patient will increase functional independence with ADLs by performing:    UE Dressing with Set-up Assistance.  LE Dressing with Contact Guard Assistance.  Grooming while seated with Set-up Assistance.  Toileting from toilet with Contact Guard Assistance for hygiene and clothing management.                       Time Tracking:     OT Date of Treatment: 12/27/18  OT Start Time: 1440  OT Stop Time: 1509  OT Total Time (min): 29 min    Billable Minutes:Therapeutic Activity 29 Mins    Jovan Montes, OT  12/27/2018

## 2018-12-28 PROBLEM — G93.40 ACUTE ENCEPHALOPATHY: Status: RESOLVED | Noted: 2018-12-23 | Resolved: 2018-12-28

## 2018-12-28 PROBLEM — E87.0 HYPERNATREMIA: Status: RESOLVED | Noted: 2018-12-23 | Resolved: 2018-12-28

## 2018-12-28 LAB
ALBUMIN SERPL BCP-MCNC: 2.2 G/DL
ANION GAP SERPL CALC-SCNC: 5 MMOL/L
BASOPHILS # BLD AUTO: 0.02 K/UL
BASOPHILS # BLD AUTO: 0.05 K/UL
BASOPHILS NFR BLD: 0.3 %
BASOPHILS NFR BLD: 0.7 %
BUN SERPL-MCNC: 41 MG/DL
CALCIUM SERPL-MCNC: 9.3 MG/DL
CHLORIDE SERPL-SCNC: 112 MMOL/L
CO2 SERPL-SCNC: 23 MMOL/L
CREAT SERPL-MCNC: 2 MG/DL
DIFFERENTIAL METHOD: ABNORMAL
DIFFERENTIAL METHOD: ABNORMAL
EOSINOPHIL # BLD AUTO: 0.1 K/UL
EOSINOPHIL # BLD AUTO: 0.2 K/UL
EOSINOPHIL NFR BLD: 1.9 %
EOSINOPHIL NFR BLD: 3 %
ERYTHROCYTE [DISTWIDTH] IN BLOOD BY AUTOMATED COUNT: 18.3 %
ERYTHROCYTE [DISTWIDTH] IN BLOOD BY AUTOMATED COUNT: 19.9 %
EST. GFR  (AFRICAN AMERICAN): 25.1 ML/MIN/1.73 M^2
EST. GFR  (NON AFRICAN AMERICAN): 21.8 ML/MIN/1.73 M^2
GLUCOSE SERPL-MCNC: 109 MG/DL
HCT VFR BLD AUTO: 27.1 %
HCT VFR BLD AUTO: 28 %
HGB BLD-MCNC: 9.1 G/DL
HGB BLD-MCNC: 9.3 G/DL
IMM GRANULOCYTES # BLD AUTO: 0.03 K/UL
IMM GRANULOCYTES # BLD AUTO: 0.05 K/UL
IMM GRANULOCYTES NFR BLD AUTO: 0.4 %
IMM GRANULOCYTES NFR BLD AUTO: 0.7 %
LYMPHOCYTES # BLD AUTO: 0.9 K/UL
LYMPHOCYTES # BLD AUTO: 1 K/UL
LYMPHOCYTES NFR BLD: 13.3 %
LYMPHOCYTES NFR BLD: 13.7 %
MAGNESIUM SERPL-MCNC: 1.9 MG/DL
MCH RBC QN AUTO: 25.1 PG
MCH RBC QN AUTO: 25.3 PG
MCHC RBC AUTO-ENTMCNC: 33.2 G/DL
MCHC RBC AUTO-ENTMCNC: 33.6 G/DL
MCV RBC AUTO: 75 FL
MCV RBC AUTO: 76 FL
MONOCYTES # BLD AUTO: 0.5 K/UL
MONOCYTES # BLD AUTO: 0.7 K/UL
MONOCYTES NFR BLD: 7.3 %
MONOCYTES NFR BLD: 9 %
NEUTROPHILS # BLD AUTO: 5 K/UL
NEUTROPHILS # BLD AUTO: 5.5 K/UL
NEUTROPHILS NFR BLD: 74.7 %
NEUTROPHILS NFR BLD: 75 %
NRBC BLD-RTO: 0 /100 WBC
NRBC BLD-RTO: 0 /100 WBC
PHOSPHATE SERPL-MCNC: 2.5 MG/DL
PHOSPHATE SERPL-MCNC: 2.8 MG/DL
PHYTONADIONE SERPL-MCNC: 0.25 NMOL/L (ref 0.22–4.88)
PLATELET # BLD AUTO: 140 K/UL
PLATELET # BLD AUTO: ABNORMAL K/UL
PMV BLD AUTO: ABNORMAL FL
PMV BLD AUTO: ABNORMAL FL
POCT GLUCOSE: 106 MG/DL (ref 70–110)
POCT GLUCOSE: 107 MG/DL (ref 70–110)
POCT GLUCOSE: 96 MG/DL (ref 70–110)
POCT GLUCOSE: 96 MG/DL (ref 70–110)
POTASSIUM SERPL-SCNC: 4 MMOL/L
RBC # BLD AUTO: 3.63 M/UL
RBC # BLD AUTO: 3.68 M/UL
SODIUM SERPL-SCNC: 140 MMOL/L
WBC # BLD AUTO: 6.7 K/UL
WBC # BLD AUTO: 7.36 K/UL

## 2018-12-28 PROCEDURE — 99232 SBSQ HOSP IP/OBS MODERATE 35: CPT | Mod: ,,, | Performed by: HOSPITALIST

## 2018-12-28 PROCEDURE — 36415 COLL VENOUS BLD VENIPUNCTURE: CPT

## 2018-12-28 PROCEDURE — 25000003 PHARM REV CODE 250: Performed by: STUDENT IN AN ORGANIZED HEALTH CARE EDUCATION/TRAINING PROGRAM

## 2018-12-28 PROCEDURE — 99232 PR SUBSEQUENT HOSPITAL CARE,LEVL II: ICD-10-PCS | Mod: ,,, | Performed by: HOSPITALIST

## 2018-12-28 PROCEDURE — 11000001 HC ACUTE MED/SURG PRIVATE ROOM

## 2018-12-28 PROCEDURE — 25000003 PHARM REV CODE 250: Performed by: HOSPITALIST

## 2018-12-28 PROCEDURE — 97530 THERAPEUTIC ACTIVITIES: CPT

## 2018-12-28 PROCEDURE — 63600175 PHARM REV CODE 636 W HCPCS: Performed by: STUDENT IN AN ORGANIZED HEALTH CARE EDUCATION/TRAINING PROGRAM

## 2018-12-28 PROCEDURE — 84100 ASSAY OF PHOSPHORUS: CPT

## 2018-12-28 PROCEDURE — 80069 RENAL FUNCTION PANEL: CPT

## 2018-12-28 PROCEDURE — 84425 ASSAY OF VITAMIN B-1: CPT

## 2018-12-28 PROCEDURE — 83735 ASSAY OF MAGNESIUM: CPT

## 2018-12-28 PROCEDURE — 85025 COMPLETE CBC W/AUTO DIFF WBC: CPT | Mod: 91

## 2018-12-28 RX ORDER — FOLIC ACID 1 MG/1
1 TABLET ORAL DAILY
Status: DISCONTINUED | OUTPATIENT
Start: 2018-12-28 | End: 2019-01-08

## 2018-12-28 RX ORDER — FOLIC ACID 1 MG/1
1 TABLET ORAL DAILY
Refills: 0
Start: 2018-12-28 | End: 2019-01-08 | Stop reason: HOSPADM

## 2018-12-28 RX ORDER — CHOLECALCIFEROL (VITAMIN D3) 25 MCG
1000 TABLET ORAL DAILY
Status: DISCONTINUED | OUTPATIENT
Start: 2018-12-28 | End: 2019-01-08

## 2018-12-28 RX ORDER — CHOLECALCIFEROL (VITAMIN D3) 25 MCG
1000 TABLET ORAL DAILY
COMMUNITY
Start: 2018-12-29 | End: 2019-01-08 | Stop reason: HOSPADM

## 2018-12-28 RX ADMIN — FOLIC ACID 1 MG: 1 TABLET ORAL at 03:12

## 2018-12-28 RX ADMIN — AMLODIPINE BESYLATE 10 MG: 10 TABLET ORAL at 08:12

## 2018-12-28 RX ADMIN — FOLIC ACID 1 MG: 5 INJECTION, SOLUTION INTRAMUSCULAR; INTRAVENOUS; SUBCUTANEOUS at 08:12

## 2018-12-28 RX ADMIN — THERA TABS 1 TABLET: TAB at 08:12

## 2018-12-28 RX ADMIN — HEPARIN SODIUM 5000 UNITS: 5000 INJECTION, SOLUTION INTRAVENOUS; SUBCUTANEOUS at 03:12

## 2018-12-28 RX ADMIN — THIAMINE HYDROCHLORIDE 100 MG: 100 INJECTION, SOLUTION INTRAMUSCULAR; INTRAVENOUS at 10:12

## 2018-12-28 RX ADMIN — HEPARIN SODIUM 5000 UNITS: 5000 INJECTION, SOLUTION INTRAVENOUS; SUBCUTANEOUS at 11:12

## 2018-12-28 RX ADMIN — VITAMIN D, TAB 1000IU (100/BT) 1000 UNITS: 25 TAB at 08:12

## 2018-12-28 RX ADMIN — TRAZODONE HYDROCHLORIDE 50 MG: 50 TABLET ORAL at 08:12

## 2018-12-28 RX ADMIN — HEPARIN SODIUM 5000 UNITS: 5000 INJECTION, SOLUTION INTRAVENOUS; SUBCUTANEOUS at 06:12

## 2018-12-28 NOTE — PROGRESS NOTES
Progress Note  Hospital Medicine    Admit Date: 12/22/2018  Length of Stay:  LOS: 5 days     SUBJECTIVE:         Follow-up For:  Hypernatremia    HPI/Interval history (See H&P for complete P,F,SHx) :     AAOX 2. encephalopathy improving.  sodium at 149--> 140 poor oral intake around 25% of meals, on pureed diet. NGT discontinued . discussed with POA this AM. POA to assess patient this PM     Review of Systems: List if applicable  Unable to perform ROS: encephalpathy  improving - AAOX 2 today  denies pain        OBJECTIVE:     Vital Signs Range (Last 24H):  Temp:  [97.3 °F (36.3 °C)-98.1 °F (36.7 °C)]   Pulse:  [72-83]   Resp:  [12-20]   BP: (118-129)/(71-78)   SpO2:  [98 %-100 %]     Physical Exam:  Physical Exam   Constitutional: No distress.   Severely frail & thin appearing elderly woman   HENT:   Head: Normocephalic and atraumatic.   Facial muscle waisting    Eyes: No scleral icterus.    Neck: Normal range of motion. Neck supple. No JVD present. No thyromegaly present. NGT insitu   Cardiovascular: Normal rate, regular rhythm and normal heart sounds.   Pulmonary/Chest: Effort normal and breath sounds normal. No stridor. No respiratory distress. She has no wheezes. She has no rales.   Abdominal: Soft. Bowel sounds are normal. She exhibits no distension. There is no tenderness. There is no guarding.   Musculoskeletal: She exhibits no edema, tenderness or deformity.   L. Elbow healing bruise    Neurological: She is alert.   encephalpathy  improving - AAOX 2 today  Skin: Skin is warm and dry. She is not diaphoretic.   Psychiatric: She has a normal mood and affect.   Nursing note and vitals reviewed.            Medications:  Medication list was reviewed and changes noted under Assessment/Plan.      Current Facility-Administered Medications:     amLODIPine tablet 10 mg, 10 mg, Oral, Daily, MATTHEW Mcgraw, 10 mg at 12/28/18 0839    bisacodyl suppository 10 mg, 10 mg, Rectal, Daily PRN, Neda Snider NP     calcium-vitamin D3 500 mg(1,250mg) -200 unit per tablet 1 tablet, 1 tablet, Per NG tube, Once, MATTHEW Mcgraw    folic acid 1 mg in dextrose 5 % 100 mL IVPB, 1 mg, Intravenous, Daily, Zuleika Oneal MD, Last Rate: 100 mL/hr at 12/28/18 0831, 1 mg at 12/28/18 0831    folic acid tablet 1 mg, 1 mg, Oral, Daily, Nabil Brandt MD    heparin (porcine) injection 5,000 Units, 5,000 Units, Subcutaneous, Q8H, Keri Bangura MD, 5,000 Units at 12/28/18 0652    multivitamin tablet 1 tablet, 1 tablet, Oral, Daily, Nabil Brandt MD, 1 tablet at 12/28/18 0839    sodium chloride 0.9% flush 3 mL, 3 mL, Intravenous, PRN, Keri Bangura MD    thiamine (B-1) 100 mg in dextrose 5 % 50 mL IVPB, 100 mg, Intravenous, Daily, Zuleika Oneal MD, Last Rate: 12.5 mL/hr at 12/28/18 1011, 100 mg at 12/28/18 1011    traZODone tablet 50 mg, 50 mg, Oral, QHS, Keri Bangura MD, 50 mg at 12/27/18 2319    vitamin D 1000 units tablet 1,000 Units, 1,000 Units, Oral, Daily, Nabil Brandt MD, 1,000 Units at 12/28/18 0839    bisacodyl, sodium chloride 0.9%    Laboratory/Diagnostic Data:  Reviewed and noted in plan where applicable- Please see chart for full lab data.    Recent Labs   Lab 12/26/18  0423 12/27/18  0634 12/28/18  0824   WBC 7.83 7.26 6.70   HGB 9.5* 9.3* 9.3*   HCT 28.4* 28.3* 28.0*   * 139* SEE COMMENT       Recent Labs   Lab 12/22/18  2301 12/23/18  0118  12/26/18  0423  12/27/18  0634 12/27/18  1235 12/27/18  1737 12/28/18  0045 12/28/18  0824   * 174*   < > 149*   < > 144 140 141 140  --    K 5.8* 5.0   < > 3.5   < > 3.7 4.1 3.9 4.0  --    CL >130* >130*   < > 121*   < > 116* 111* 111* 112*  --    CO2 19* 20*   < > 21*   < > 23 23 25 23  --    * 112*   < > 44*   < > 43* 46* 43* 41*  --    CREATININE 5.6* 5.6*   < > 2.5*   < > 2.3* 3.9* 2.1* 2.0*  --    * 108   < > 92   < > 123* 116* 127* 109  --    CALCIUM 11.0* 11.0*   < > 9.0   < > 8.9 9.3 9.3 9.3  --    MG 3.9* 3.4*   < > 1.9   --  1.8  --   --   --  1.9   PHOS  --   --    < > 3.0  2.9   < > 2.5*  2.6* 3.1 2.2* 2.5* 2.8   LIPASE 212* 198*  --   --   --   --   --   --   --   --     < > = values in this interval not displayed.       Recent Labs   Lab 12/22/18  2301 12/23/18  0118  12/27/18  1235 12/27/18  1737 12/28/18  0045   ALKPHOS 66 63  --   --   --   --    ALT 9* 7*  --   --   --   --    AST 22 12  --   --   --   --    ALBUMIN 3.8 3.7   < > 2.3* 2.4* 2.2*   PROT 8.8* 8.1  --   --   --   --    BILITOT 0.8 0.8  --   --   --   --     < > = values in this interval not displayed.        Microbiology labs for the last week  Microbiology Results (last 7 days)     Procedure Component Value Units Date/Time    Urine culture [[id number]] Collected:  12/23/18 1731    Order Status:  Sent Specimen:  Urine, Catheterized Updated:  12/23/18 1732           Imaging Results          X-Ray Abdomen Flat And Erect (Final result)  Result time 12/23/18 00:15:32    Final result by Zay Hernadez MD (12/23/18 00:15:32)                 Impression:      Nonobstructed bowel-gas pattern.      Electronically signed by: Zay Hernadez MD  Date:    12/23/2018  Time:    00:15             Narrative:    EXAMINATION:  XR ABDOMEN FLAT AND ERECT    CLINICAL HISTORY:  Constipation, unspecified    TECHNIQUE:  Flat and erect AP views of the abdomen were preformed.    COMPARISON:  None    FINDINGS:  Costochondral calcifications project over the upper abdomen.  Bowel gas pattern is non-obstructive.  No evidence for pneumoperitoneum.  Partially visualized fixation hardware at the right hip.                               CT Head Without Contrast (Final result)  Result time 12/23/18 00:26:39    Final result by Zay Hernadez MD (12/23/18 00:26:39)                 Impression:      1. No evidence of acute intracranial hemorrhage or major vascular distribution infarct.  2. Remote right occipital infarct.  Remote lacunar infarct right basal ganglia.  3. Chronic microvascular  "ischemic change.  4. Generalized cerebral volume loss.    Electronically signed by resident: Shaun Juan  Date:    12/22/2018  Time:    23:59    Electronically signed by: Zay Hernadez MD  Date:    12/23/2018  Time:    00:26             Narrative:    EXAMINATION:  CT HEAD WITHOUT CONTRAST    CLINICAL HISTORY:  Confusion/delirium, altered LOC, unexplained    TECHNIQUE:  Low dose axial images were obtained through the head.  Coronal and sagittal reformations were also performed. Contrast was not administered.    COMPARISON:  MRI brain 06/28/2016, CT head 06/27/2016    FINDINGS:  Age-appropriate generalized cerebral volume loss with compensatory widening of the sulci and ventricular system.  No evidence of hydrocephalus.  No extra-axial blood or fluid collections.    Encephalomalacia of the right paramedian occipital lobe compatible with remote infarct.  Remote lacunar infarct right basal ganglia, unchanged.  Patchy and confluent hypoattenuation of the supratentorial white matter consistent with chronic microvascular ischemic change.    No evidence of acute intracranial hemorrhage or major vascular distribution infarct.  No parenchymal mass or edema.    No calvarial fracture.  Mastoid air cells and paranasal sinuses are clear.                                Estimated body mass index is 13.99 kg/m² as calculated from the following:    Height as of this encounter: 5' 2" (1.575 m).    Weight as of this encounter: 34.7 kg (76 lb 8 oz).    I & O (Last 24H):    Intake/Output Summary (Last 24 hours) at 12/28/2018 1346  Last data filed at 12/28/2018 0900  Gross per 24 hour   Intake 530 ml   Output 125 ml   Net 405 ml       Estimated Creatinine Clearance: 10.7 mL/min (A) (based on SCr of 2 mg/dL (H)).    ASSESSMENT/PLAN:     Active Problems:    Active Hospital Problems    Diagnosis  POA    *Hypernatremia [E87.0]    Na of 174 upon admission  -serial BMPs q 6hrly  -suspect 2/2 dehydration from advanced dementia  -patient " down to Na of 152 on d5w at 75 cc/hr   will transition to 400 cc free water boluses   TID(equivalent to 50 cc/hr x 24hrs)    AAOX 2. encephalopathy improving.  sodium at 149--> 140 poor oral intake around 25% of meals, on pureed diet. NGT discontinued . discussed with POA this AM. POA to assess patient this PM      Yes    Goals of care, counseling/discussion [Z71.89]full code for now   Not Applicable    Insomnia [G47.00]on trazodone  Yes    Dysphagia [R13.10]s/p SLP Evaluation, likely encephalopathy. started clear liquids   NGT placed  and started trickle feeds  - NGT feeds advanced as tolerated   - SLP recommended liquid diet and keeping NGT for now. TF rate (of Isosource 1.5) to 35 mL/hr   - poor oral intake. advanced to puree diet  needs assistance with feeding     Unknown    Electrolyte disturbance [E87.8]as above  Unknown    Caregiver burden [Z63.6]Patient's son  four months ago and family noticed a decline in her mental status since then. NH options for placement  Not Applicable    Mood disturbance [R45.86] R/o depression post sons's demise  Unknown    Acute encephalopathy [G93.40]encephalpathy  improving - AAOX 2 today. likely metabolic enecophalopathy. improving .on thiamine, folate and vitamin D for malnutrition, dietitian consulted for diet, currently on tricklefeeding by NGT, vitamins levels in process.afebrile, non elevated WBC, no UTI on UA . CT head - No evidence of acute intracranial hemorrhage or major vascular distribution infarct. Remote right occipital infarct    Unknown    Malnutrition [E46]Low pre-albumin/ SLP eval. nutrition consulted On NGT feeding Isosource at 35ml  Yes     Chronic    Stage 4 chronic kidney disease [N18.4]  Yes     Chronic    ODILIA (acute kidney injury) [N17.9]on CKD - Cr 5.6--> 2.5 -->2 improving with IV hydration. monitor    anemia - Hb 13--> 9.5 microcytic.  likely hemoconcentration. ferrokinetics   Thrombocytopenia 139 monitor  Falls - 2 episodes at  home no repoted LOC. obtain CPK - normal   Unknown    Essential hypertension [I10]controlled on amlodipine  Yes     Chronic      Resolved Hospital Problems   No resolved problems to display.         Disposition- SNF. Daughter in law -POA wants to try SNF. possible Discharge on monday    DVT prophylaxis addressed with: AMBER Brandt MD  Attending Staff Physician  [facility]  pager- [phone] Aidafxfomrf - [phone]

## 2018-12-28 NOTE — PHYSICIAN QUERY
PT Name: Cesilia Delgado  MR #: [id number]     CDS/: Anh Crouch RN              Contact information: [phone]  This form is a permanent document in the medical record.     Query Date: December 28, 2018    Physician Query - Neurological Condition Clarification    By submitting this query, we are merely seeking further clarification of documentation to reflect the severity of illness of your patient. Please utilize your independent clinical judgment when addressing the question(s) below.    The Medical record reflects the following:     Indicators   Supporting Clinical Findings Location in Medical Record   x AMS, Confusion,  LOC, etc.  1-2 weeks onset of altered mental status.      CC Med H&P 12/23   x Acute / Chronic Illness Acute encephalopathy, mood disturbance, ODILIA, CKD, malnutrtion CC Med H&P 12/23   x Radiology Findings Head CT: No hemorrhage.  No skull fracture.  No acute stroke     Ed Provider note 12/22   x Electrolyte Imbalance Hypernatremia     CC Med H&P 12/23    Medication     x Treatment         will plan to replace with 1/2 NS at 100cc/hr for 20 hours for goal decrease in Na of 10meq/ day    serial BMPs, Mg & Ph q2hr   -free water deficit of 4L    CC Med h&P 12/23   x Other encephalopathy improving    Acute Encephalopathy  -afebrile, non elevated WBC, no UTI on UA   -most likely 2/2 hypernatremia & worsening dementia  -would consider depression as well    suspect 2/2 dehydration from advanced dementia    PN 12/28    CC Med PN 12/28                  CC Med H&P 12/23     Encephalopathy- is a general term for any diffuse disease of the brain that alters brain function or structure. Treatment of the cognitive dysfunction varies but is ultimately dependent on the treatment of the underlying condition.    Major Symptoms of Encephalopathy - Decreased level of consciousness, fluctuating alertness/concentration, confusion, agitation, lethargy, somnolence, drowsiness, obtundation, stupor, or coma.          References: National Institutes of Healths (NIH) National [place] of Neurological Disorders and Strokes;  HCPro 2016; Advisory Board     Clinical Guidelines:   These guidelines will set system standards to assist providers in managing, documentation, and coding of encephalopathy. The intent of this document is to serve as a system guideline, not replace the providers clinical judgment:  Provider, please specify the diagnosis or diagnoses associated with above clinical findings.  [ x  ] Metabolic Encephalopathy - Due to electrolye imbalance, metabolic derangements, or infections processes, includes Septic Encephalopathy   [   ] Other Encephalopathy - Includes uremic encephalopathy   [   ] Unspecified Encephalopathy      [   ] Other Neurological Condition-  Includes Post-ictal altered mental status. (please specify condition): _________   [   ]  Clinically Undetermined     Please document in your progress notes daily for the duration of treatment until resolved, and include in your discharge summary.

## 2018-12-28 NOTE — PT/OT/SLP PROGRESS
Physical Therapy Treatment    Patient Name:  Cesilia Delgado   MRN:  [id number]    Recommendations:     Discharge Recommendations:  nursing facility, skilled   Discharge Equipment Recommendations: none   Barriers to discharge: Inaccessible home and Decreased caregiver support    Assessment:     Cesilia Delgado is a 88 y.o. female admitted with a medical diagnosis of Hypernatremia.  She presents with the following impairments/functional limitations:  weakness, impaired functional mobilty, impaired balance, impaired cognition, impaired self care skills, gait instability, decreased lower extremity function, impaired cardiopulmonary response to activity .Mrs. Delgado requires assistance with all mobility and transfers.  Limited progression with therapy secondary to patient trying to return supine.  She was  unable to come to complete stand.  She demonstrated fair sitting balance.  Mrs. Delgado will require 24 hour assistance upon discharge from hospital.     Rehab Prognosis: Poor; patient would benefit from acute skilled PT services to address these deficits and reach maximum level of function.    Recent Surgery: * No surgery found *      Plan:     During this hospitalization, patient to be seen 4 x/week to address the identified rehab impairments via gait training, therapeutic activities, therapeutic exercises, neuromuscular re-education and progress toward the following goals:    · Plan of Care Expires:  01/25/19    Subjective     Chief Complaint: Mrs. Delgado denied any pain  Patient/Family Comments/goals: I just want to lie down  Pain/Comfort:  · Pain Rating 1: 0/10      Objective:     Communicated with NSG prior to session.  Patient found supine pressure relief boots, NG tube, peripheral IV, telemetry  upon PT entry to room.     General Precautions: Standard, fall   Orthopedic Precautions:N/A   Braces:       Functional Mobility:  · Bed Mobility:     · Rolling Left:  moderate assistance  · Rolling Right: moderate  assistance  · Scooting: maximal assistance  · Supine to Sit: moderate assistance  · Sit to Supine: moderate assistance  · Transfers:     · Sit to Stand:  total assistance with no AD. Patient able to clear hips off bed but unable to come to a complete stand.  Attempted second attempt but Mrs. Delgado resisted.  · Balance: fair sitting balance      AM-PAC 6 CLICK MOBILITY  Turning over in bed (including adjusting bedclothes, sheets and blankets)?: 2  Sitting down on and standing up from a chair with arms (e.g., wheelchair, bedside commode, etc.): 2  Moving from lying on back to sitting on the side of the bed?: 2  Moving to and from a bed to a chair (including a wheelchair)?: 1  Need to walk in hospital room?: 1  Climbing 3-5 steps with a railing?: 1  Basic Mobility Total Score: 9       Therapeutic Activities and Exercises:   Mrs. Delgado tolerated sitting EOB 5-6 min with SBA/CGA.  Patient kept trying to lie back down and required V/T cues for distraction      Patient left supine with all lines intact and NSG present..    GOALS:   Multidisciplinary Problems     Physical Therapy Goals        Problem: Physical Therapy Goal    Goal Priority Disciplines Outcome Goal Variances Interventions   Physical Therapy Goal     PT, PT/OT Ongoing (interventions implemented as appropriate)     Description:  Goals to be met by: 2019      Patient will increase functional independence with mobility by performin. Supine to sit with Stand-by Assistance  2. Sit to supine with Stand-by Assistance  3. Sit to stand transfer with Minimum assistance   4. Bed to chair transfer with Minimal Assistance using LRAD or no AD   5. Gait  x 10 feet with Minimal Assistance using LRAD or no AD.                       Time Tracking:     PT Received On: 18  PT Start Time: 1000     PT Stop Time: 1015  PT Total Time (min): 15 min     Billable Minutes: Therapeutic Activity 15    Treatment Type: Treatment  PT/PTA: PTA     PTA Visit Number: 1      Haris Brantley II, PTA  12/28/2018

## 2018-12-28 NOTE — PLAN OF CARE
Problem: Physical Therapy Goal  Goal: Physical Therapy Goal  Goals to be met by: 2019      Patient will increase functional independence with mobility by performin. Supine to sit with Stand-by Assistance  2. Sit to supine with Stand-by Assistance  3. Sit to stand transfer with Minimum assistance   4. Bed to chair transfer with Minimal Assistance using LRAD or no AD   5. Gait  x 10 feet with Minimal Assistance using LRAD or no AD.      Patient goals remain appropriate.  Patient will continue to benefit from further PT services.  Haris Brantley PTA\

## 2018-12-28 NOTE — NURSING
Nurse was notified by PCT that patient vomited during incontinence care. Tube feeding was held by nurse before incontinence care started. Per PCT, large amount of vomit was on patient's gown. PCT cleaned patient. Patient did not appear to be in distress. AAO to self only. Daughter in law present at bedside. Hob elevated.No residual./72 HR 83 T 97.6 RR 20 O2sat 100% on rm air. Dr. Brandt notified. Per Md stop tube feeding,check blood sugars a3kcgys. Will cont to monitor

## 2018-12-28 NOTE — PHYSICIAN QUERY
PT Name: Cesilia Delgado  MR #: [id number]    Physician Query Form - Nutrition Clarification     CDS/: Anh Crouch RN              Contact information:  [phone]    This form is a permanent document in the medical record.     Query Date: December 28, 2018    By submitting this query, we are merely seeking further clarification of documentation.. Please utilize your independent clinical judgment when addressing the question(s) below.    The Medical record contains the following:   Indicators  Supporting Clinical Findings Location in Medical Record   x % of Estimated Energy Intake over a time frame from p.o., TF, or TPN Energy Calories Required: not meeting needs  Protein Required: not meeting needs     Nutrition note 12/24   x Weight Status over a time frame Per chart review, weight ranges from 76-83 pounds x 2 years.      Nutrition note 12/24   x Subcutaneous Fat and/or Muscle Loss Body Fat Depletion: severe depletion of orbitals and triceps   Muscle Mass Depletion: severe depletion of temples, clavicle region, scapular region and lower extremities  Nutrition note 12/24    Fluid Accumulation or Edema      Reduced  Strength     x Wt / BMI / Usual Body Weight Wt 34.7kg  BMI 14  Ht 5'2    BMI Grade: less than 16 protein-energy malnutrition grade III   Nutrition note 12/24    Delayed Wound Healing / Failure to Thrive     x Acute or Chronic Illness CKD, HTN, Dementia  Encephalopathy, ODILIA, dementia    Decreased appetite, dysphagia H&P 12/23        Nutrition note 12/24    Medication     x Treatment receiving TFs at trickle rate via NGT     Nutrition note 12/24   x Other Severely frail & thin appearing elderly woman  Dry mucous membranes   Poor skin turgor   Skin tenting      Malnutrition    NFPE complete, severe malnutrition noted.    CC Med H&P 12/23                Nutrition note 12/24       AND / ASPEN Clinical Characteristics (October 2011)  A minimum of two characteristics is recommended for diagnosing either  moderate or severe malnutrition   Mild Malnutrition Moderate Malnutrition Severe Malnutrition   Energy Intake from p.o., TF or TPN. < 75% intake of estimated energy needs for less than 7 days < 75% intake of estimated energy needs for greater than 7 days < 50% intake of estimated energy needs for > 5 days   Weight Loss 1-2% in 1 month  5% in 3 months  7.5% in 6 months  10% in 1 year 1-2 % in 1 week  5% in 1 month  7.5% in 3 months  10% in 6 months  20% in 1 year > 2% in 1 week  > 5% in 1 month  > 7.5% in 3 months  > 10% in 6 months  > 20% in 1 year   Physical Findings     None *Mild subcutaneous fat and/or muscle loss  *Mild fluid accumulation  *Stage II decubitus  *Surgical wound or non-healing wound *Mod/severe subcutaneous fat and/or muscle loss  *Mod/severe fluid accumulation  *Stage III or IV decubitus  *Non-healing surgical wound     Provider, please specify diagnosis or diagnoses associated with above clinical findings.    [  ] Moderate Protein-Calorie Malnutrition   [  ] Severe Protein-Calorie Malnutrition   [  ] Other Nutritional Diagnosis (please specify):    [  ] Other:    [  ] Clinically Undetermined       Please document in your progress notes daily for the duration of treatment until resolved and include in your discharge summary.

## 2018-12-28 NOTE — PROGRESS NOTES
Pt remains free from and injury of falls during shift. Awake, alert, and oriented to self only. Vital signs stable. No signs of acute distress noted at this time. One episode on nausea and vomiting occured during shift. Bed in lowest position, wheels locked, and bed alarm on. Call light in reach. Will continue to monitor.

## 2018-12-28 NOTE — PLAN OF CARE
Pt was accepted by [facility].     12/28/18 1356   Post-Acute Status   Post-Acute Authorization Placement   Post-Acute Placement Status Authorization Obtained

## 2018-12-28 NOTE — PLAN OF CARE
SW completed pt's LOCET and is waiting to receive 142 to send to facility. SW also spoke to Rhonda at [facility], who said the pt had received auth. SW spoke to Joy at [facility] and told her the patient is ready today. Joy told this SW that she had to see if they could receive the pt today. SW is expecting call back and will continue to follow up.     SW received 142 and spoke to the facility. The facility does not accept pt's after 2:00pm and will be ready to accept the pt Monday. SW received [facility] transportation auth (#[id number]) for [facility] for Monday.     Joseph Mosqueda, HECTOR  [facility]  [phone]

## 2018-12-28 NOTE — PLAN OF CARE
[facility]     Department of Hospital Medicine     [address] [place] [address]     [phone] after hours  [phone] fax       NURSING HOME ORDERS    Patient Name: Cesilia Delgado  YOB: 1930 12/28/2018    Admit to Nursing Home: Skilled Bed      Diagnoses:  Active Hospital Problems    Diagnosis  POA    *Hypernatremia [E87.0]  Yes    Goals of care, counseling/discussion [Z71.89]  Not Applicable    Insomnia [G47.00]  Yes    Dysphagia [R13.10]  Unknown    Electrolyte disturbance [E87.8]  Unknown    Caregiver burden [Z63.6]  Not Applicable    Mood disturbance [R45.86]  Unknown    Acute encephalopathy [G93.40]  Unknown    Malnutrition [E46]  Yes     Chronic    Stage 4 chronic kidney disease [N18.4]  Yes     Chronic    ODILIA (acute kidney injury) [N17.9]  Unknown    Essential hypertension [I10]  Yes     Chronic      Resolved Hospital Problems   No resolved problems to display.       Patient is homebound due to:  Hypernatremia    Allergies:Review of patient's allergies indicates:  No Known Allergies    Vitals:      Every shift (Skilled Nursing patients)    Diet: Puree, Liquid Diet Level: Thin   Aspiration Precautions: 1 bite/sip at a time, Assistance with all PO intake, Continue alternate means of nutrition, Feed only when awake/alert, HOB to 90 degrees, Monitor for s/s of aspiration, Monitor for pocketing, Remain upright 30 minutes post all PO inake, Small sips/bites and Standard aspiration precautions   General Precautions: Standard, fall, pureed diet, aspiration        Acitivities:    - Up in a chair each morning as tolerated  - Ambulate with assistance to bathroom  - Scheduled walks once each shift (every 8 hours)  - May ambulate independently  - May use walker, cane, or self-propelled wheelchair       - Weight bearing: as tolerated     LABS:  Per facility protocol  CMP daily x 3 days    Nursing Precautions:   - Aspiration  precautions:             - Total assistance with meals            -  Upright 90 degrees befor during and after meals             -  Suction at bedside          - Fall precautions per nursing home protocol   - Decubitus precautions:        -  for positioning   - Pressure reducing foam mattress   - Turn patient every two hours. Use wedge pillows to anchor patient    CONSULTS:    Physical Therapy to evaluate and treat     Occupational Therapy to evaluate and treat     Speech Therapy  to evaluate and treat     Nutrition to evaluate and recommend diet     Psychiatry to evaluate and follow patients for delirium        MISCELLANEOUS CARE:     Routine Skin for Bedridden Patients:  Apply moisture barrier cream to all    skin folds and wet areas in perineal area daily and after baths and                           all bowel movements.            DIABETES CARE: N/A    Medications: Discontinue all previous medication orders, if any. See new list below.     Cesilia Delgado   Home Medication Instructions MARY:[id number]    Printed on:12/28/18 1203   Medication Information                      amLODIPine (NORVASC) 5 MG tablet  Take 1 tablet (5 mg total) by mouth once daily.             ferrous sulfate 325 mg (65 mg iron) Tab tablet  Take 1 tablet by mouth once daily.             folic acid (FOLVITE) 1 MG tablet  Take 1 tablet (1 mg total) by mouth once daily.             multivitamin (THERAGRAN) tablet  Take 1 tablet by mouth once daily.             nut.tx.impaired renal fxn,soy (NEPRO) 0.08-1.80 gram-kcal/mL Liqd  Take 1 Can by mouth once daily.             traZODone (DESYREL) 50 MG tablet  Take 1 tablet (50 mg total) by mouth every evening.             vitamin D (VITAMIN D3) 1000 units Tab  Take 1 tablet (1,000 Units total) by mouth once daily.             _________________________________  Nabil Brandt MD  12/28/2018

## 2018-12-28 NOTE — PLAN OF CARE
Discharge planning: Patient not able to discharge to accepting facility today because they are not able to accept patient after 2:00pm. Anticipate discharge Monday.

## 2018-12-29 LAB
ALBUMIN SERPL BCP-MCNC: 2.2 G/DL
ALBUMIN SERPL BCP-MCNC: 2.3 G/DL
ALP SERPL-CCNC: 59 U/L
ALT SERPL W/O P-5'-P-CCNC: 29 U/L
ANION GAP SERPL CALC-SCNC: 5 MMOL/L
ANION GAP SERPL CALC-SCNC: 6 MMOL/L
AST SERPL-CCNC: 34 U/L
BASOPHILS # BLD AUTO: 0.03 K/UL
BASOPHILS NFR BLD: 0.5 %
BILIRUB SERPL-MCNC: 0.4 MG/DL
BUN SERPL-MCNC: 35 MG/DL
BUN SERPL-MCNC: 35 MG/DL
CALCIUM SERPL-MCNC: 10 MG/DL
CALCIUM SERPL-MCNC: 9.3 MG/DL
CHLORIDE SERPL-SCNC: 113 MMOL/L
CHLORIDE SERPL-SCNC: 114 MMOL/L
CO2 SERPL-SCNC: 24 MMOL/L
CO2 SERPL-SCNC: 25 MMOL/L
CREAT SERPL-MCNC: 1.8 MG/DL
CREAT SERPL-MCNC: 1.9 MG/DL
DIFFERENTIAL METHOD: ABNORMAL
EOSINOPHIL # BLD AUTO: 0.2 K/UL
EOSINOPHIL NFR BLD: 2.5 %
ERYTHROCYTE [DISTWIDTH] IN BLOOD BY AUTOMATED COUNT: 18.4 %
EST. GFR  (AFRICAN AMERICAN): 26.8 ML/MIN/1.73 M^2
EST. GFR  (AFRICAN AMERICAN): 28.6 ML/MIN/1.73 M^2
EST. GFR  (NON AFRICAN AMERICAN): 23.2 ML/MIN/1.73 M^2
EST. GFR  (NON AFRICAN AMERICAN): 24.8 ML/MIN/1.73 M^2
GLUCOSE SERPL-MCNC: 80 MG/DL
GLUCOSE SERPL-MCNC: 85 MG/DL
HCT VFR BLD AUTO: 25.9 %
HGB BLD-MCNC: 8.5 G/DL
IMM GRANULOCYTES # BLD AUTO: 0.03 K/UL
IMM GRANULOCYTES NFR BLD AUTO: 0.5 %
LYMPHOCYTES # BLD AUTO: 1 K/UL
LYMPHOCYTES NFR BLD: 16.1 %
MAGNESIUM SERPL-MCNC: 1.8 MG/DL
MAGNESIUM SERPL-MCNC: 1.9 MG/DL
MCH RBC QN AUTO: 24.3 PG
MCHC RBC AUTO-ENTMCNC: 32.8 G/DL
MCV RBC AUTO: 74 FL
MONOCYTES # BLD AUTO: 0.6 K/UL
MONOCYTES NFR BLD: 9.5 %
NEUTROPHILS # BLD AUTO: 4.3 K/UL
NEUTROPHILS NFR BLD: 70.9 %
NRBC BLD-RTO: 0 /100 WBC
PHOSPHATE SERPL-MCNC: 3 MG/DL
PHOSPHATE SERPL-MCNC: 3.1 MG/DL
PHOSPHATE SERPL-MCNC: 3.4 MG/DL
PLATELET # BLD AUTO: 145 K/UL
PMV BLD AUTO: ABNORMAL FL
POCT GLUCOSE: 104 MG/DL (ref 70–110)
POCT GLUCOSE: 112 MG/DL (ref 70–110)
POCT GLUCOSE: 95 MG/DL (ref 70–110)
POCT GLUCOSE: 95 MG/DL (ref 70–110)
POTASSIUM SERPL-SCNC: 3.8 MMOL/L
POTASSIUM SERPL-SCNC: 4.1 MMOL/L
PROT SERPL-MCNC: 5.8 G/DL
RBC # BLD AUTO: 3.5 M/UL
SODIUM SERPL-SCNC: 142 MMOL/L
SODIUM SERPL-SCNC: 145 MMOL/L
WBC # BLD AUTO: 6.02 K/UL

## 2018-12-29 PROCEDURE — 84100 ASSAY OF PHOSPHORUS: CPT

## 2018-12-29 PROCEDURE — 99232 PR SUBSEQUENT HOSPITAL CARE,LEVL II: ICD-10-PCS | Mod: ,,, | Performed by: HOSPITALIST

## 2018-12-29 PROCEDURE — 83735 ASSAY OF MAGNESIUM: CPT

## 2018-12-29 PROCEDURE — 25000003 PHARM REV CODE 250: Performed by: HOSPITALIST

## 2018-12-29 PROCEDURE — 80053 COMPREHEN METABOLIC PANEL: CPT

## 2018-12-29 PROCEDURE — 36415 COLL VENOUS BLD VENIPUNCTURE: CPT

## 2018-12-29 PROCEDURE — 63600175 PHARM REV CODE 636 W HCPCS: Performed by: STUDENT IN AN ORGANIZED HEALTH CARE EDUCATION/TRAINING PROGRAM

## 2018-12-29 PROCEDURE — 80069 RENAL FUNCTION PANEL: CPT

## 2018-12-29 PROCEDURE — 25000003 PHARM REV CODE 250: Performed by: STUDENT IN AN ORGANIZED HEALTH CARE EDUCATION/TRAINING PROGRAM

## 2018-12-29 PROCEDURE — 11000001 HC ACUTE MED/SURG PRIVATE ROOM

## 2018-12-29 PROCEDURE — 84100 ASSAY OF PHOSPHORUS: CPT | Mod: 91

## 2018-12-29 PROCEDURE — 85025 COMPLETE CBC W/AUTO DIFF WBC: CPT

## 2018-12-29 PROCEDURE — 83735 ASSAY OF MAGNESIUM: CPT | Mod: 91

## 2018-12-29 PROCEDURE — 99232 SBSQ HOSP IP/OBS MODERATE 35: CPT | Mod: ,,, | Performed by: HOSPITALIST

## 2018-12-29 RX ORDER — DEXTROSE MONOHYDRATE 50 MG/ML
INJECTION, SOLUTION INTRAVENOUS CONTINUOUS
Status: ACTIVE | OUTPATIENT
Start: 2018-12-29 | End: 2018-12-30

## 2018-12-29 RX ADMIN — FOLIC ACID 1 MG: 5 INJECTION, SOLUTION INTRAMUSCULAR; INTRAVENOUS; SUBCUTANEOUS at 02:12

## 2018-12-29 RX ADMIN — THIAMINE HYDROCHLORIDE 100 MG: 100 INJECTION, SOLUTION INTRAMUSCULAR; INTRAVENOUS at 02:12

## 2018-12-29 RX ADMIN — TRAZODONE HYDROCHLORIDE 50 MG: 50 TABLET ORAL at 09:12

## 2018-12-29 RX ADMIN — HEPARIN SODIUM 5000 UNITS: 5000 INJECTION, SOLUTION INTRAVENOUS; SUBCUTANEOUS at 01:12

## 2018-12-29 RX ADMIN — THERA TABS 1 TABLET: TAB at 08:12

## 2018-12-29 RX ADMIN — VITAMIN D, TAB 1000IU (100/BT) 1000 UNITS: 25 TAB at 08:12

## 2018-12-29 RX ADMIN — AMLODIPINE BESYLATE 10 MG: 10 TABLET ORAL at 08:12

## 2018-12-29 RX ADMIN — DEXTROSE: 5 SOLUTION INTRAVENOUS at 04:12

## 2018-12-29 RX ADMIN — HEPARIN SODIUM 5000 UNITS: 5000 INJECTION, SOLUTION INTRAVENOUS; SUBCUTANEOUS at 09:12

## 2018-12-29 RX ADMIN — HEPARIN SODIUM 5000 UNITS: 5000 INJECTION, SOLUTION INTRAVENOUS; SUBCUTANEOUS at 06:12

## 2018-12-29 RX ADMIN — FOLIC ACID 1 MG: 1 TABLET ORAL at 08:12

## 2018-12-29 NOTE — PROGRESS NOTES
Progress Note  Hospital Medicine    Admit Date: 12/22/2018  Length of Stay:  LOS: 6 days     SUBJECTIVE:         Follow-up For:  Hypernatremia    HPI/Interval history (See H&P for complete P,F,SHx) :     AAOX 2. encephalopathy improving.  sodium at 149--> 142-->145 poor oral intake around 25% of meals, on pureed diet. started on D5w. 8beats of NSVT.  discussed with POA this AM. started on boost     Review of Systems: List if applicable  Unable to perform ROS: encephalpathy  improving - AAOX 2 today  denies pain        OBJECTIVE:     Vital Signs Range (Last 24H):  Temp:  [96.9 °F (36.1 °C)-98.9 °F (37.2 °C)]   Pulse:  [62-75]   Resp:  [10-16]   BP: (121-129)/(67-77)   SpO2:  [97 %-99 %]     Physical Exam:  Physical Exam   Constitutional: No distress.   Severely frail & thin appearing elderly woman   HENT:   Head: Normocephalic and atraumatic.   Facial muscle waisting    Eyes: No scleral icterus.    Neck: Normal range of motion. Neck supple. No JVD present. No thyromegaly present. NGT insitu   Cardiovascular: Normal rate, regular rhythm and normal heart sounds.   Pulmonary/Chest: Effort normal and breath sounds normal. No stridor. No respiratory distress. She has no wheezes. She has no rales.   Abdominal: Soft. Bowel sounds are normal. She exhibits no distension. There is no tenderness. There is no guarding.   Musculoskeletal: She exhibits no edema, tenderness or deformity.   L. Elbow healing bruise    Neurological: She is alert.   encephalpathy  improving - AAOX 2 today  Skin: Skin is warm and dry. She is not diaphoretic.   Psychiatric: She has a normal mood and affect.   Nursing note and vitals reviewed.            Medications:  Medication list was reviewed and changes noted under Assessment/Plan.      Current Facility-Administered Medications:     amLODIPine tablet 10 mg, 10 mg, Oral, Daily, MATTHEW Mcgraw, 10 mg at 12/29/18 0867    bisacodyl suppository 10 mg, 10 mg, Rectal, Daily PRN, Neda Snider,  NP    calcium-vitamin D3 500 mg(1,250mg) -200 unit per tablet 1 tablet, 1 tablet, Per NG tube, Once, Rohan Sweet, MATTHEW    folic acid 1 mg in dextrose 5 % 100 mL IVPB, 1 mg, Intravenous, Daily, Zuleika Oneal MD, Stopped at 12/29/18 0900    folic acid tablet 1 mg, 1 mg, Oral, Daily, Nabil Brandt MD, 1 mg at 12/29/18 0852    heparin (porcine) injection 5,000 Units, 5,000 Units, Subcutaneous, Q8H, Keri Bangura MD, 5,000 Units at 12/29/18 0614    multivitamin tablet 1 tablet, 1 tablet, Oral, Daily, Nabil Brandt MD, 1 tablet at 12/29/18 0852    sodium chloride 0.9% flush 3 mL, 3 mL, Intravenous, PRN, Keri Bangura MD    thiamine (B-1) 100 mg in dextrose 5 % 50 mL IVPB, 100 mg, Intravenous, Daily, Zuleika Oneal MD, Stopped at 12/29/18 0900    traZODone tablet 50 mg, 50 mg, Oral, QHS, Keri Bangura MD, 50 mg at 12/28/18 2035    vitamin D 1000 units tablet 1,000 Units, 1,000 Units, Oral, Daily, Nabil Brandt MD, 1,000 Units at 12/29/18 0853    bisacodyl, sodium chloride 0.9%    Laboratory/Diagnostic Data:  Reviewed and noted in plan where applicable- Please see chart for full lab data.    Recent Labs   Lab 12/28/18  0824 12/28/18  1635 12/29/18  0557   WBC 6.70 7.36 6.02   HGB 9.3* 9.1* 8.5*   HCT 28.0* 27.1* 25.9*   PLT SEE COMMENT 140* 145*       Recent Labs   Lab 12/22/18  2301 12/23/18  0118  12/27/18  0634  12/27/18  1737 12/28/18  0045 12/28/18  0824 12/29/18  0557   * 174*   < > 144   < > 141 140  --  142   K 5.8* 5.0   < > 3.7   < > 3.9 4.0  --  3.8   CL >130* >130*   < > 116*   < > 111* 112*  --  113*   CO2 19* 20*   < > 23   < > 25 23  --  24   * 112*   < > 43*   < > 43* 41*  --  35*   CREATININE 5.6* 5.6*   < > 2.3*   < > 2.1* 2.0*  --  1.8*   * 108   < > 123*   < > 127* 109  --  80   CALCIUM 11.0* 11.0*   < > 8.9   < > 9.3 9.3  --  9.3   MG 3.9* 3.4*   < > 1.8  --   --   --  1.9 1.8   PHOS  --   --    < > 2.5*  2.6*   < > 2.2* 2.5* 2.8 3.0  3.1   LIPASE  212* 198*  --   --   --   --   --   --   --     < > = values in this interval not displayed.       Recent Labs   Lab 12/22/18  2301 12/23/18  0118  12/27/18  1737 12/28/18  0045 12/29/18  0557   ALKPHOS 66 63  --   --   --   --    ALT 9* 7*  --   --   --   --    AST 22 12  --   --   --   --    ALBUMIN 3.8 3.7   < > 2.4* 2.2* 2.2*   PROT 8.8* 8.1  --   --   --   --    BILITOT 0.8 0.8  --   --   --   --     < > = values in this interval not displayed.        Microbiology labs for the last week  Microbiology Results (last 7 days)     Procedure Component Value Units Date/Time    Urine culture [[id number]] Collected:  12/23/18 1731    Order Status:  Sent Specimen:  Urine, Catheterized Updated:  12/23/18 1732           Imaging Results          X-Ray Abdomen Flat And Erect (Final result)  Result time 12/23/18 00:15:32    Final result by Zay Hernadez MD (12/23/18 00:15:32)                 Impression:      Nonobstructed bowel-gas pattern.      Electronically signed by: Zay Hernadez MD  Date:    12/23/2018  Time:    00:15             Narrative:    EXAMINATION:  XR ABDOMEN FLAT AND ERECT    CLINICAL HISTORY:  Constipation, unspecified    TECHNIQUE:  Flat and erect AP views of the abdomen were preformed.    COMPARISON:  None    FINDINGS:  Costochondral calcifications project over the upper abdomen.  Bowel gas pattern is non-obstructive.  No evidence for pneumoperitoneum.  Partially visualized fixation hardware at the right hip.                               CT Head Without Contrast (Final result)  Result time 12/23/18 00:26:39    Final result by Zay Hernadez MD (12/23/18 00:26:39)                 Impression:      1. No evidence of acute intracranial hemorrhage or major vascular distribution infarct.  2. Remote right occipital infarct.  Remote lacunar infarct right basal ganglia.  3. Chronic microvascular ischemic change.  4. Generalized cerebral volume loss.    Electronically signed by resident: Shaun  "Foto  Date:    12/22/2018  Time:    23:59    Electronically signed by: Zay Hernadez MD  Date:    12/23/2018  Time:    00:26             Narrative:    EXAMINATION:  CT HEAD WITHOUT CONTRAST    CLINICAL HISTORY:  Confusion/delirium, altered LOC, unexplained    TECHNIQUE:  Low dose axial images were obtained through the head.  Coronal and sagittal reformations were also performed. Contrast was not administered.    COMPARISON:  MRI brain 06/28/2016, CT head 06/27/2016    FINDINGS:  Age-appropriate generalized cerebral volume loss with compensatory widening of the sulci and ventricular system.  No evidence of hydrocephalus.  No extra-axial blood or fluid collections.    Encephalomalacia of the right paramedian occipital lobe compatible with remote infarct.  Remote lacunar infarct right basal ganglia, unchanged.  Patchy and confluent hypoattenuation of the supratentorial white matter consistent with chronic microvascular ischemic change.    No evidence of acute intracranial hemorrhage or major vascular distribution infarct.  No parenchymal mass or edema.    No calvarial fracture.  Mastoid air cells and paranasal sinuses are clear.                                Estimated body mass index is 13.99 kg/m² as calculated from the following:    Height as of this encounter: 5' 2" (1.575 m).    Weight as of this encounter: 34.7 kg (76 lb 8 oz).    I & O (Last 24H):    Intake/Output Summary (Last 24 hours) at 12/29/2018 0951  Last data filed at 12/29/2018 0600  Gross per 24 hour   Intake 100 ml   Output 250 ml   Net -150 ml       Estimated Creatinine Clearance: 11.8 mL/min (A) (based on SCr of 1.8 mg/dL (H)).    ASSESSMENT/PLAN:     Active Problems:    Active Hospital Problems    Diagnosis  POA    *Hypernatremia [E87.0]    Na of 174 upon admission  -serial BMPs q 6hrly  -suspect 2/2 dehydration from advanced dementia  -patient down to Na of 152 on d5w at 75 cc/hr   will transition to 400 cc free water boluses   TID(equivalent " to 50 cc/hr x 24hrs)    AAOX 2. encephalopathy improving. sodium at 149--> 142-->145 poor oral intake around 25% of meals, on pureed diet. started on D5w.discussed with POA this AM. started on boost       Yes    Goals of care, counseling/discussion [Z71.89]full code for now   Not Applicable    Insomnia [G47.00]on trazodone  Yes    Dysphagia [R13.10]s/p SLP Evaluation, likely encephalopathy. started clear liquids   NGT placed  and started trickle feeds  - NGT feeds advanced as tolerated   - SLP recommended liquid diet and keeping NGT for now. TF rate (of Isosource 1.5) to 35 mL/hr   - poor oral intake. advanced to puree diet  needs assistance with feeding     Unknown    Electrolyte disturbance [E87.8]as above  Unknown    Caregiver burden [Z63.6]Patient's son  four months ago and family noticed a decline in her mental status since then. NH options for placement  Not Applicable    Mood disturbance [R45.86] R/o depression post sons's demise  Unknown    Acute encephalopathy [G93.40]encephalpathy  improving - AAOX 2 today. likely metabolic enecophalopathy. improving .on thiamine, folate and vitamin D for malnutrition, dietitian consulted for diet, currently on tricklefeeding by NGT, vitamins levels in process.afebrile, non elevated WBC, no UTI on UA . CT head - No evidence of acute intracranial hemorrhage or major vascular distribution infarct. Remote right occipital infarct    Unknown    Malnutrition [E46]Low pre-albumin/ SLP eval. nutrition consulted On NGT feeding Isosource at 35ml  Yes     Chronic    Stage 4 chronic kidney disease [N18.4]  Yes     Chronic    ODILIA (acute kidney injury) [N17.9]on CKD - Cr 5.6--> 2.5 -->2 improving with IV hydration. monitor    anemia - Hb 13--> 9.5 microcytic.  likely hemoconcentration. ferrokinetics   Thrombocytopenia 139 monitor  Falls - 2 episodes at home no repoted LOC. obtain CPK - normal   Unknown    Essential hypertension [I10]controlled on amlodipine  Yes      Chronic      Resolved Hospital Problems   No resolved problems to display.         Disposition- SNF. Daughter in law -POA wants to try SNF. possible Discharge on monday    DVT prophylaxis addressed with: AMBER Brandt MD  Attending Staff Physician  [facility]  pager- [phone] [place] - [phone]

## 2018-12-29 NOTE — PLAN OF CARE
Problem: Adult Inpatient Plan of Care  Goal: Plan of Care Review  Outcome: Ongoing (interventions implemented as appropriate)  Fall precaution maintained this shift call bell in reach bed in low position no acute distress noted resp even and unlabored pt reposition every 2hrs and as needed.  Vs stable no new breakdown noted. No c/o over night

## 2018-12-29 NOTE — NURSING
Patient had 8 beats of vtachy at bedside  , asymptomatic 126/68 HR80 , Lungs clear ,refused dietary supplement . Dr Brandt notified

## 2018-12-29 NOTE — PLAN OF CARE
Problem: Adult Inpatient Plan of Care  Goal: Plan of Care Review  Outcome: Ongoing (interventions implemented as appropriate)  Pt shows no s/s of distress or discomfort. Safety measures met. Free from falls and injury. Denies any pain. Able to verbalize all needs.Bedrest.Poor appetite. Bed locked and placed in lowest position. Call light within reach. Bed alarm set.

## 2018-12-30 LAB
ALBUMIN SERPL BCP-MCNC: 2.2 G/DL
ALBUMIN SERPL BCP-MCNC: 2.3 G/DL
ALP SERPL-CCNC: 56 U/L
ALT SERPL W/O P-5'-P-CCNC: 35 U/L
ANION GAP SERPL CALC-SCNC: 5 MMOL/L
ANION GAP SERPL CALC-SCNC: 6 MMOL/L
AST SERPL-CCNC: 41 U/L
BASOPHILS # BLD AUTO: 0.02 K/UL
BASOPHILS NFR BLD: 0.4 %
BILIRUB SERPL-MCNC: 0.3 MG/DL
BUN SERPL-MCNC: 28 MG/DL
BUN SERPL-MCNC: 28 MG/DL
CALCIUM SERPL-MCNC: 9.4 MG/DL
CALCIUM SERPL-MCNC: 9.9 MG/DL
CHLORIDE SERPL-SCNC: 109 MMOL/L
CHLORIDE SERPL-SCNC: 110 MMOL/L
CO2 SERPL-SCNC: 23 MMOL/L
CO2 SERPL-SCNC: 25 MMOL/L
CREAT SERPL-MCNC: 1.7 MG/DL
CREAT SERPL-MCNC: 1.8 MG/DL
DIFFERENTIAL METHOD: ABNORMAL
EOSINOPHIL # BLD AUTO: 0.1 K/UL
EOSINOPHIL NFR BLD: 1.9 %
ERYTHROCYTE [DISTWIDTH] IN BLOOD BY AUTOMATED COUNT: 18.2 %
EST. GFR  (AFRICAN AMERICAN): 28.6 ML/MIN/1.73 M^2
EST. GFR  (AFRICAN AMERICAN): 30.6 ML/MIN/1.73 M^2
EST. GFR  (NON AFRICAN AMERICAN): 24.8 ML/MIN/1.73 M^2
EST. GFR  (NON AFRICAN AMERICAN): 26.5 ML/MIN/1.73 M^2
GLUCOSE SERPL-MCNC: 102 MG/DL
GLUCOSE SERPL-MCNC: 78 MG/DL
HCT VFR BLD AUTO: 25.4 %
HGB BLD-MCNC: 8.5 G/DL
IMM GRANULOCYTES # BLD AUTO: 0.02 K/UL
IMM GRANULOCYTES NFR BLD AUTO: 0.4 %
LYMPHOCYTES # BLD AUTO: 1.1 K/UL
LYMPHOCYTES NFR BLD: 20.6 %
MAGNESIUM SERPL-MCNC: 1.8 MG/DL
MCH RBC QN AUTO: 25.2 PG
MCHC RBC AUTO-ENTMCNC: 33.5 G/DL
MCV RBC AUTO: 75 FL
MONOCYTES # BLD AUTO: 0.6 K/UL
MONOCYTES NFR BLD: 12 %
NEUTROPHILS # BLD AUTO: 3.3 K/UL
NEUTROPHILS NFR BLD: 64.7 %
NRBC BLD-RTO: 0 /100 WBC
PHOSPHATE SERPL-MCNC: 3.1 MG/DL
PHOSPHATE SERPL-MCNC: 3.1 MG/DL
PLATELET # BLD AUTO: 131 K/UL
PMV BLD AUTO: ABNORMAL FL
POCT GLUCOSE: 101 MG/DL (ref 70–110)
POCT GLUCOSE: 106 MG/DL (ref 70–110)
POCT GLUCOSE: 119 MG/DL (ref 70–110)
POCT GLUCOSE: 120 MG/DL (ref 70–110)
POTASSIUM SERPL-SCNC: 3.7 MMOL/L
POTASSIUM SERPL-SCNC: 4.5 MMOL/L
PROT SERPL-MCNC: 5.9 G/DL
RBC # BLD AUTO: 3.37 M/UL
SODIUM SERPL-SCNC: 139 MMOL/L
SODIUM SERPL-SCNC: 139 MMOL/L
WBC # BLD AUTO: 5.15 K/UL

## 2018-12-30 PROCEDURE — 36415 COLL VENOUS BLD VENIPUNCTURE: CPT

## 2018-12-30 PROCEDURE — 80069 RENAL FUNCTION PANEL: CPT

## 2018-12-30 PROCEDURE — 85025 COMPLETE CBC W/AUTO DIFF WBC: CPT

## 2018-12-30 PROCEDURE — 25000003 PHARM REV CODE 250: Performed by: STUDENT IN AN ORGANIZED HEALTH CARE EDUCATION/TRAINING PROGRAM

## 2018-12-30 PROCEDURE — 63600175 PHARM REV CODE 636 W HCPCS: Performed by: STUDENT IN AN ORGANIZED HEALTH CARE EDUCATION/TRAINING PROGRAM

## 2018-12-30 PROCEDURE — 99232 SBSQ HOSP IP/OBS MODERATE 35: CPT | Mod: ,,, | Performed by: HOSPITALIST

## 2018-12-30 PROCEDURE — 84100 ASSAY OF PHOSPHORUS: CPT

## 2018-12-30 PROCEDURE — 99232 PR SUBSEQUENT HOSPITAL CARE,LEVL II: ICD-10-PCS | Mod: ,,, | Performed by: HOSPITALIST

## 2018-12-30 PROCEDURE — 83735 ASSAY OF MAGNESIUM: CPT

## 2018-12-30 PROCEDURE — 80053 COMPREHEN METABOLIC PANEL: CPT

## 2018-12-30 PROCEDURE — 11000001 HC ACUTE MED/SURG PRIVATE ROOM

## 2018-12-30 PROCEDURE — 25000003 PHARM REV CODE 250: Performed by: HOSPITALIST

## 2018-12-30 RX ORDER — THIAMINE HCL 100 MG
100 TABLET ORAL DAILY
Status: DISCONTINUED | OUTPATIENT
Start: 2018-12-31 | End: 2019-01-08

## 2018-12-30 RX ADMIN — HEPARIN SODIUM 5000 UNITS: 5000 INJECTION, SOLUTION INTRAVENOUS; SUBCUTANEOUS at 06:12

## 2018-12-30 RX ADMIN — FOLIC ACID 1 MG: 5 INJECTION, SOLUTION INTRAMUSCULAR; INTRAVENOUS; SUBCUTANEOUS at 09:12

## 2018-12-30 RX ADMIN — VITAMIN D, TAB 1000IU (100/BT) 1000 UNITS: 25 TAB at 09:12

## 2018-12-30 RX ADMIN — AMLODIPINE BESYLATE 10 MG: 10 TABLET ORAL at 09:12

## 2018-12-30 RX ADMIN — FOLIC ACID 1 MG: 1 TABLET ORAL at 09:12

## 2018-12-30 RX ADMIN — TRAZODONE HYDROCHLORIDE 50 MG: 50 TABLET ORAL at 10:12

## 2018-12-30 RX ADMIN — HEPARIN SODIUM 5000 UNITS: 5000 INJECTION, SOLUTION INTRAVENOUS; SUBCUTANEOUS at 02:12

## 2018-12-30 RX ADMIN — THIAMINE HYDROCHLORIDE 100 MG: 100 INJECTION, SOLUTION INTRAMUSCULAR; INTRAVENOUS at 09:12

## 2018-12-30 RX ADMIN — HEPARIN SODIUM 5000 UNITS: 5000 INJECTION, SOLUTION INTRAVENOUS; SUBCUTANEOUS at 10:12

## 2018-12-30 RX ADMIN — THERA TABS 1 TABLET: TAB at 09:12

## 2018-12-30 NOTE — PLAN OF CARE
Problem: Adult Inpatient Plan of Care  Goal: Absence of Hospital-Acquired Illness or Injury    Intervention: Identify and Manage Fall Risk  Patient alert and orientated , follows commands , bed in low position , call light within reach and patient demonstrated proper usage. Bed locked with brake , Room clutter free and personal items with reach, addressed care plan for today , all questions answered and allow patient time for clarification. Medications and diet fluid balance  instructions with signs and symptoms and the importance to continue once discharged .Reviewed discharged , next  Follow up appointment. Replaced purwike ,canister emptied ,Patient able voiced , does experienced periods of lucid conversation.

## 2018-12-30 NOTE — PLAN OF CARE
Problem: Adult Inpatient Plan of Care  Goal: Plan of Care Review  Outcome: Ongoing (interventions implemented as appropriate)  Pt remains free from and injury of falls during shift. Awake, alert, and oriented to self only. Vital signs stable. No signs of acute distress noted at this time. Bed in lowest position, wheels locked, and bed alarm on. Call light in reach. Will continue to monitor.

## 2018-12-30 NOTE — PROGRESS NOTES
Progress Note  Hospital Medicine    Admit Date: 12/22/2018  Length of Stay:  LOS: 7 days     SUBJECTIVE:         Follow-up For:  Hypernatremia    HPI/Interval history (See H&P for complete P,F,SHx) :     AAOX 2. encephalopathy improving.  sodium at 149--> 142-->145 -0-> 139poor oral intake around 25% of meals, on pureed diet. started on D5w. 8beats of NSVT.  discussed goals of care with POA this AM , who is requesting PEG. palliative care consulted.  started on boost     Review of Systems: List if applicable  Unable to perform ROS: encephalpathy  improving - AAOX 1 today  denies pain        OBJECTIVE:     Vital Signs Range (Last 24H):  Temp:  [96 °F (35.6 °C)-98.6 °F (37 °C)]   Pulse:  [54-78]   Resp:  [16-18]   BP: (106-134)/(58-82)   SpO2:  [87 %-98 %]     Physical Exam:  Physical Exam   Constitutional: No distress.   Severely frail & thin appearing elderly woman   HENT:   Head: Normocephalic and atraumatic.   Facial muscle waisting    Eyes: No scleral icterus.    Neck: Normal range of motion. Neck supple. No JVD present. No thyromegaly present. NGT insitu   Cardiovascular: Normal rate, regular rhythm and normal heart sounds.   Pulmonary/Chest: Effort normal and breath sounds normal. No stridor. No respiratory distress. She has no wheezes. She has no rales.   Abdominal: Soft. Bowel sounds are normal. She exhibits no distension. There is no tenderness. There is no guarding.   Musculoskeletal: She exhibits no edema, tenderness or deformity.   L. Elbow healing bruise    Neurological: She is alert.   encephalpathy  improving - AAOX 1 today  Skin: Skin is warm and dry. She is not diaphoretic.   Psychiatric: She has a normal mood and affect.   Nursing note and vitals reviewed.            Medications:  Medication list was reviewed and changes noted under Assessment/Plan.      Current Facility-Administered Medications:     amLODIPine tablet 10 mg, 10 mg, Oral, Daily, MATTHEW Mcgraw, 10 mg at 12/30/18 0977     bisacodyl suppository 10 mg, 10 mg, Rectal, Daily PRN, Neda Snider, NP    calcium-vitamin D3 500 mg(1,250mg) -200 unit per tablet 1 tablet, 1 tablet, Per NG tube, Once, MATTHEW Mcgraw    folic acid 1 mg in dextrose 5 % 100 mL IVPB, 1 mg, Intravenous, Daily, Zuleika Oneal MD, Last Rate: 100 mL/hr at 12/30/18 0936, 1 mg at 12/30/18 0936    folic acid tablet 1 mg, 1 mg, Oral, Daily, Nabil Brandt MD, 1 mg at 12/30/18 0936    heparin (porcine) injection 5,000 Units, 5,000 Units, Subcutaneous, Q8H, Keri Bangura MD, 5,000 Units at 12/30/18 0635    multivitamin tablet 1 tablet, 1 tablet, Oral, Daily, Nabil Brandt MD, 1 tablet at 12/30/18 0936    sodium chloride 0.9% flush 3 mL, 3 mL, Intravenous, PRN, Keri Bangura MD    thiamine (B-1) 100 mg in dextrose 5 % 50 mL IVPB, 100 mg, Intravenous, Daily, Zuleika Oneal MD, Last Rate: 12.5 mL/hr at 12/30/18 0935, 100 mg at 12/30/18 0935    traZODone tablet 50 mg, 50 mg, Oral, QHS, Keri Bangura MD, 50 mg at 12/29/18 2120    vitamin D 1000 units tablet 1,000 Units, 1,000 Units, Oral, Daily, Nabil Brandt MD, 1,000 Units at 12/30/18 0936    bisacodyl, sodium chloride 0.9%    Laboratory/Diagnostic Data:  Reviewed and noted in plan where applicable- Please see chart for full lab data.    Recent Labs   Lab 12/28/18  1635 12/29/18  0557 12/30/18  0540   WBC 7.36 6.02 5.15   HGB 9.1* 8.5* 8.5*   HCT 27.1* 25.9* 25.4*   * 145* 131*       Recent Labs   Lab 12/29/18  0557 12/29/18  1405 12/30/18  0540    145 139   K 3.8 4.1 3.7   * 114* 109   CO2 24 25 25   BUN 35* 35* 28*   CREATININE 1.8* 1.9* 1.7*   GLU 80 85 102   CALCIUM 9.3 10.0 9.4   MG 1.8 1.9 1.8   PHOS 3.0  3.1 3.4 3.1  3.1       Recent Labs   Lab 12/29/18  0557 12/29/18  1405 12/30/18  0540   ALKPHOS  --  59  --    ALT  --  29  --    AST  --  34  --    ALBUMIN 2.2* 2.3* 2.2*   PROT  --  5.8*  --    BILITOT  --  0.4  --         Microbiology labs for the last  week  Microbiology Results (last 7 days)     Procedure Component Value Units Date/Time    Urine culture [[id number]] Collected:  12/23/18 1731    Order Status:  Sent Specimen:  Urine, Catheterized Updated:  12/23/18 1732           Imaging Results          X-Ray Abdomen Flat And Erect (Final result)  Result time 12/23/18 00:15:32    Final result by Zay Hernadez MD (12/23/18 00:15:32)                 Impression:      Nonobstructed bowel-gas pattern.      Electronically signed by: Zay Hernadez MD  Date:    12/23/2018  Time:    00:15             Narrative:    EXAMINATION:  XR ABDOMEN FLAT AND ERECT    CLINICAL HISTORY:  Constipation, unspecified    TECHNIQUE:  Flat and erect AP views of the abdomen were preformed.    COMPARISON:  None    FINDINGS:  Costochondral calcifications project over the upper abdomen.  Bowel gas pattern is non-obstructive.  No evidence for pneumoperitoneum.  Partially visualized fixation hardware at the right hip.                               CT Head Without Contrast (Final result)  Result time 12/23/18 00:26:39    Final result by Zay Hernadez MD (12/23/18 00:26:39)                 Impression:      1. No evidence of acute intracranial hemorrhage or major vascular distribution infarct.  2. Remote right occipital infarct.  Remote lacunar infarct right basal ganglia.  3. Chronic microvascular ischemic change.  4. Generalized cerebral volume loss.    Electronically signed by resident: Shaun Juan  Date:    12/22/2018  Time:    23:59    Electronically signed by: Zay Hernadez MD  Date:    12/23/2018  Time:    00:26             Narrative:    EXAMINATION:  CT HEAD WITHOUT CONTRAST    CLINICAL HISTORY:  Confusion/delirium, altered LOC, unexplained    TECHNIQUE:  Low dose axial images were obtained through the head.  Coronal and sagittal reformations were also performed. Contrast was not administered.    COMPARISON:  MRI brain 06/28/2016, CT head 06/27/2016    FINDINGS:  Age-appropriate  "generalized cerebral volume loss with compensatory widening of the sulci and ventricular system.  No evidence of hydrocephalus.  No extra-axial blood or fluid collections.    Encephalomalacia of the right paramedian occipital lobe compatible with remote infarct.  Remote lacunar infarct right basal ganglia, unchanged.  Patchy and confluent hypoattenuation of the supratentorial white matter consistent with chronic microvascular ischemic change.    No evidence of acute intracranial hemorrhage or major vascular distribution infarct.  No parenchymal mass or edema.    No calvarial fracture.  Mastoid air cells and paranasal sinuses are clear.                                Estimated body mass index is 13.99 kg/m² as calculated from the following:    Height as of this encounter: 5' 2" (1.575 m).    Weight as of this encounter: 34.7 kg (76 lb 8 oz).    I & O (Last 24H):    Intake/Output Summary (Last 24 hours) at 12/30/2018 1154  Last data filed at 12/30/2018 0500  Gross per 24 hour   Intake 20 ml   Output 325 ml   Net -305 ml       Estimated Creatinine Clearance: 12.5 mL/min (A) (based on SCr of 1.7 mg/dL (H)).    ASSESSMENT/PLAN:     Active Problems:    Active Hospital Problems    Diagnosis  POA    *Hypernatremia [E87.0]    Na of 174 upon admission  -serial BMPs q 6hrly  -suspect 2/2 dehydration from advanced dementia  -patient down to Na of 152 on d5w at 75 cc/hr   will transition to 400 cc free water boluses   TID(equivalent to 50 cc/hr x 24hrs)    AAOX 2. encephalopathy improving. sodium at 149--> 142-->145 poor oral intake around 25% of meals, on pureed diet. started on D5w.discussed goals of care with POA this AM , who is requesting PEG. palliative care consulted.     Yes    Goals of care, counseling/discussion [Z71.89]full code for now   Not Applicable    Insomnia [G47.00]on trazodone  Yes    Dysphagia [R13.10]s/p SLP Evaluation, likely encephalopathy. started clear liquids   NGT placed 12/23 and started trickle " feeds  - NGT feeds advanced as tolerated   - SLP recommended liquid diet and keeping NGT for now. TF rate (of Isosource 1.5) to 35 mL/hr   - poor oral intake. advanced to puree diet  needs assistance with feeding     Unknown    Electrolyte disturbance [E87.8]as above  Unknown    Caregiver burden [Z63.6]Patient's son  four months ago and family noticed a decline in her mental status since then. NH options for placement  Not Applicable    Mood disturbance [R45.86] R/o depression post sons's demise  Unknown    Acute encephalopathy [G93.40]encephalpathy  improving - AAOX 2 today. likely metabolic enecophalopathy. improving .on thiamine, folate and vitamin D for malnutrition, dietitian consulted for diet, currently on tricklefeeding by NGT, vitamins levels in process.afebrile, non elevated WBC, no UTI on UA . CT head - No evidence of acute intracranial hemorrhage or major vascular distribution infarct. Remote right occipital infarct    Unknown    Malnutrition [E46]Low pre-albumin/ SLP eval. nutrition consulted On NGT feeding Isosource at 35ml  Yes     Chronic    Stage 4 chronic kidney disease [N18.4]  Yes     Chronic    ODILIA (acute kidney injury) [N17.9]on CKD - Cr 5.6--> 2.5 --1.9 improving with IV hydration. monitor    anemia - Hb 13--> 9.5 microcytic.  likely hemoconcentration. ferrokinetics   Thrombocytopenia 139 monitor  Falls - 2 episodes at home no repoted LOC. obtain CPK - normal   Unknown    Essential hypertension [I10]controlled on amlodipine  Yes     Chronic      Resolved Hospital Problems   No resolved problems to display.         Disposition- SNF. Daughter in law -POA wants to try SNF. palliative care consulted    DVT prophylaxis addressed with: AMBER Brandt MD  Attending Staff Physician  [facility]  pager- [phone]  Spectralwxt - [phone]

## 2018-12-31 PROBLEM — Z51.5 PALLIATIVE CARE ENCOUNTER: Status: ACTIVE | Noted: 2018-12-31

## 2018-12-31 LAB
ALBUMIN SERPL BCP-MCNC: 2.1 G/DL
ANION GAP SERPL CALC-SCNC: 5 MMOL/L
ANISOCYTOSIS BLD QL SMEAR: SLIGHT
ASCENDING AORTA: 2.74 CM
AV INDEX (PROSTH): 0.54
AV MEAN GRADIENT: 6.38 MMHG
AV PEAK GRADIENT: 10.5 MMHG
AV VALVE AREA: 1.63 CM2
BACTERIA #/AREA URNS AUTO: ABNORMAL /HPF
BASOPHILS # BLD AUTO: 0.02 K/UL
BASOPHILS NFR BLD: 0.5 %
BILIRUB UR QL STRIP: NEGATIVE
BSA FOR ECHO PROCEDURE: 1.23 M2
BUN SERPL-MCNC: 23 MG/DL
CALCIUM SERPL-MCNC: 9.7 MG/DL
CHLORIDE SERPL-SCNC: 109 MMOL/L
CLARITY UR REFRACT.AUTO: ABNORMAL
CO2 SERPL-SCNC: 26 MMOL/L
COLOR UR AUTO: YELLOW
CREAT SERPL-MCNC: 1.6 MG/DL
CV ECHO LV RWT: 0.33 CM
DIFFERENTIAL METHOD: ABNORMAL
DOP CALC AO PEAK VEL: 1.62 M/S
DOP CALC AO VTI: 36.61 CM
DOP CALC LVOT AREA: 3.02 CM2
DOP CALC LVOT DIAMETER: 1.96 CM
DOP CALC LVOT STROKE VOLUME: 59.77 CM3
DOP CALCLVOT PEAK VEL VTI: 19.82 CM
E WAVE DECELERATION TIME: 307.46 MSEC
E/A RATIO: 0.57
E/E' RATIO: 14.5
ECHO LV POSTERIOR WALL: 0.65 CM (ref 0.6–1.1)
EOSINOPHIL # BLD AUTO: 0.1 K/UL
EOSINOPHIL NFR BLD: 2.3 %
ERYTHROCYTE [DISTWIDTH] IN BLOOD BY AUTOMATED COUNT: 19.1 %
EST. GFR  (AFRICAN AMERICAN): 32.9 ML/MIN/1.73 M^2
EST. GFR  (NON AFRICAN AMERICAN): 28.6 ML/MIN/1.73 M^2
FRACTIONAL SHORTENING: 25 % (ref 28–44)
GLUCOSE SERPL-MCNC: 77 MG/DL
GLUCOSE UR QL STRIP: NEGATIVE
HCT VFR BLD AUTO: 25.5 %
HGB BLD-MCNC: 8.7 G/DL
HGB UR QL STRIP: NEGATIVE
HYPOCHROMIA BLD QL SMEAR: ABNORMAL
IMM GRANULOCYTES # BLD AUTO: 0.08 K/UL
IMM GRANULOCYTES NFR BLD AUTO: 2 %
INTERVENTRICULAR SEPTUM: 0.61 CM (ref 0.6–1.1)
IVRT: 0.13 MSEC
KETONES UR QL STRIP: NEGATIVE
LA MAJOR: 4.45 CM
LA MINOR: 4.42 CM
LA WIDTH: 3.51 CM
LEFT ATRIUM SIZE: 2.72 CM
LEFT ATRIUM VOLUME INDEX: 28.4 ML/M2
LEFT ATRIUM VOLUME: 35.99 CM3
LEFT INTERNAL DIMENSION IN SYSTOLE: 2.96 CM (ref 2.1–4)
LEFT VENTRICLE DIASTOLIC VOLUME INDEX: 53.57 ML/M2
LEFT VENTRICLE DIASTOLIC VOLUME: 67.88 ML
LEFT VENTRICLE MASS INDEX: 52.9 G/M2
LEFT VENTRICLE SYSTOLIC VOLUME INDEX: 26.8 ML/M2
LEFT VENTRICLE SYSTOLIC VOLUME: 33.98 ML
LEFT VENTRICULAR INTERNAL DIMENSION IN DIASTOLE: 3.95 CM (ref 3.5–6)
LEFT VENTRICULAR MASS: 66.99 G
LEUKOCYTE ESTERASE UR QL STRIP: ABNORMAL
LV LATERAL E/E' RATIO: 14.5
LV SEPTAL E/E' RATIO: 14.5
LYMPHOCYTES # BLD AUTO: 0.8 K/UL
LYMPHOCYTES NFR BLD: 21 %
MAGNESIUM SERPL-MCNC: 1.7 MG/DL
MCH RBC QN AUTO: 24.8 PG
MCHC RBC AUTO-ENTMCNC: 34.1 G/DL
MCV RBC AUTO: 73 FL
MICROSCOPIC COMMENT: ABNORMAL
MONOCYTES # BLD AUTO: 0.5 K/UL
MONOCYTES NFR BLD: 12.6 %
MV PEAK A VEL: 1.01 M/S
MV PEAK E VEL: 0.58 M/S
NEUTROPHILS # BLD AUTO: 2.4 K/UL
NEUTROPHILS NFR BLD: 61.6 %
NITRITE UR QL STRIP: NEGATIVE
NON-SQ EPI CELLS #/AREA URNS AUTO: 1 /HPF
NRBC BLD-RTO: 0 /100 WBC
PH UR STRIP: 5 [PH] (ref 5–8)
PHOSPHATE SERPL-MCNC: 3.3 MG/DL
PHOSPHATE SERPL-MCNC: 3.4 MG/DL
PLATELET # BLD AUTO: 137 K/UL
PLATELET BLD QL SMEAR: ABNORMAL
PMV BLD AUTO: ABNORMAL FL
POCT GLUCOSE: 112 MG/DL (ref 70–110)
POCT GLUCOSE: 117 MG/DL (ref 70–110)
POCT GLUCOSE: 67 MG/DL (ref 70–110)
POCT GLUCOSE: 91 MG/DL (ref 70–110)
POLYCHROMASIA BLD QL SMEAR: ABNORMAL
POTASSIUM SERPL-SCNC: 3.8 MMOL/L
PROT UR QL STRIP: NEGATIVE
PULM VEIN S/D RATIO: 1.14
PV PEAK D VEL: 0.21 M/S
PV PEAK S VEL: 0.24 M/S
RA MAJOR: 3.95 CM
RA PRESSURE: 3 MMHG
RA WIDTH: 2.68 CM
RBC # BLD AUTO: 3.51 M/UL
RBC #/AREA URNS AUTO: 1 /HPF (ref 0–4)
RIGHT VENTRICULAR END-DIASTOLIC DIMENSION: 2.19 CM
RV TISSUE DOPPLER FREE WALL SYSTOLIC VELOCITY 1 (APICAL 4 CHAMBER VIEW): 9.03 M/S
SINUS: 2.86 CM
SODIUM SERPL-SCNC: 140 MMOL/L
SP GR UR STRIP: 1.01 (ref 1–1.03)
SQUAMOUS #/AREA URNS AUTO: 10 /HPF
STJ: 2.14 CM
TDI LATERAL: 0.04
TDI SEPTAL: 0.04
TDI: 0.04
TRICUSPID ANNULAR PLANE SYSTOLIC EXCURSION: 1.5 CM
URN SPEC COLLECT METH UR: ABNORMAL
WBC # BLD AUTO: 3.96 K/UL
WBC #/AREA URNS AUTO: 8 /HPF (ref 0–5)

## 2018-12-31 PROCEDURE — 87040 BLOOD CULTURE FOR BACTERIA: CPT

## 2018-12-31 PROCEDURE — 80069 RENAL FUNCTION PANEL: CPT

## 2018-12-31 PROCEDURE — 11000001 HC ACUTE MED/SURG PRIVATE ROOM

## 2018-12-31 PROCEDURE — 81001 URINALYSIS AUTO W/SCOPE: CPT

## 2018-12-31 PROCEDURE — 99233 SBSQ HOSP IP/OBS HIGH 50: CPT | Mod: ,,, | Performed by: HOSPITALIST

## 2018-12-31 PROCEDURE — 25000003 PHARM REV CODE 250: Performed by: HOSPITALIST

## 2018-12-31 PROCEDURE — 92526 ORAL FUNCTION THERAPY: CPT

## 2018-12-31 PROCEDURE — 36415 COLL VENOUS BLD VENIPUNCTURE: CPT

## 2018-12-31 PROCEDURE — 25000003 PHARM REV CODE 250: Performed by: STUDENT IN AN ORGANIZED HEALTH CARE EDUCATION/TRAINING PROGRAM

## 2018-12-31 PROCEDURE — 83735 ASSAY OF MAGNESIUM: CPT

## 2018-12-31 PROCEDURE — 99223 PR INITIAL HOSPITAL CARE,LEVL III: ICD-10-PCS | Mod: ,,, | Performed by: INTERNAL MEDICINE

## 2018-12-31 PROCEDURE — 85025 COMPLETE CBC W/AUTO DIFF WBC: CPT

## 2018-12-31 PROCEDURE — 63600175 PHARM REV CODE 636 W HCPCS: Performed by: STUDENT IN AN ORGANIZED HEALTH CARE EDUCATION/TRAINING PROGRAM

## 2018-12-31 PROCEDURE — 99233 PR SUBSEQUENT HOSPITAL CARE,LEVL III: ICD-10-PCS | Mod: ,,, | Performed by: HOSPITALIST

## 2018-12-31 PROCEDURE — 99223 1ST HOSP IP/OBS HIGH 75: CPT | Mod: ,,, | Performed by: INTERNAL MEDICINE

## 2018-12-31 PROCEDURE — 84100 ASSAY OF PHOSPHORUS: CPT

## 2018-12-31 RX ADMIN — THERA TABS 1 TABLET: TAB at 10:12

## 2018-12-31 RX ADMIN — Medication 100 MG: at 10:12

## 2018-12-31 RX ADMIN — AMLODIPINE BESYLATE 10 MG: 10 TABLET ORAL at 10:12

## 2018-12-31 RX ADMIN — FOLIC ACID 1 MG: 1 TABLET ORAL at 10:12

## 2018-12-31 RX ADMIN — VITAMIN D, TAB 1000IU (100/BT) 1000 UNITS: 25 TAB at 10:12

## 2018-12-31 RX ADMIN — TRAZODONE HYDROCHLORIDE 50 MG: 50 TABLET ORAL at 11:12

## 2018-12-31 RX ADMIN — HEPARIN SODIUM 5000 UNITS: 5000 INJECTION, SOLUTION INTRAVENOUS; SUBCUTANEOUS at 03:12

## 2018-12-31 RX ADMIN — HEPARIN SODIUM 5000 UNITS: 5000 INJECTION, SOLUTION INTRAVENOUS; SUBCUTANEOUS at 05:12

## 2018-12-31 RX ADMIN — HEPARIN SODIUM 5000 UNITS: 5000 INJECTION, SOLUTION INTRAVENOUS; SUBCUTANEOUS at 11:12

## 2018-12-31 NOTE — PROGRESS NOTES
"[facility]  Adult Nutrition  Progress Note    SUMMARY       Recommendations    Recommendation/Intervention:   1. Continue current pureed diet per SLP recs with Boost Plus.   2. If TF required, resume previous order of Isosource at a goal rate of 35 ml/hr.   3. RD following.    Goals: Meet % EEN, EPN  Nutrition Goal Status: goal not met  Communication of RD Recs: reviewed with RN    Reason for Assessment    Reason For Assessment: new tube feeding  Diagnosis: other (see comments)(Hypernatremia)  Relevant Medical History: CKD, HTN, Dementia    General Information Comments: Diet advanced to pureed, but pt with poor PO intake. Receiving assistance with feeding and eating ~25% of meals. Boost ordered. No family members at bedside to aid in determining diet/weight history.    Nutrition Discharge Planning: Unable to determine    Nutrition Risk Screen    Nutrition Risk Screen: (refusal to eat)    Nutrition/Diet History    Patient Reported Diet/Restrictions/Preferences: other (see comments)(KRISTI)  Spiritual, Cultural Beliefs, Adventism Practices, Values that Affect Care: no  Factors Affecting Nutritional Intake: decreased appetite, impaired cognitive status/motor control    Anthropometrics    Temp: 98.3 °F (36.8 °C)  Height Method: Stated  Height: 5' 2" (157.5 cm)  Height (inches): 62 in  Weight Method: Bed Scale  Weight: 34.5 kg (76 lb)  Weight (lb): 76 lb  Ideal Body Weight (IBW), Female: 110 lb  % Ideal Body Weight, Female (lb): 69.09 lb  BMI (Calculated): 13.9  BMI Grade: less than 16 protein-energy malnutrition grade III  Usual Body Weight (UBW), kg: (76-83# x 2 years)       Lab/Procedures/Meds    Pertinent Labs Reviewed: reviewed  Pertinent Labs Comments: Crt 1.6, GFR 32.9, Alb 2.1  Pertinent Medications Reviewed: reviewed  Pertinent Medications Comments: Ca-vit D, folic acid, MVI, thiamine, vit D    Estimated/Assessed Needs    Weight Used For Calorie Calculations: 34.7 kg (76 lb 8 oz)  Energy " Calorie Requirements (kcal): 6807-1485 kcal/d  Energy Need Method: Kcal/kg, other (see comments)(35-40 kcal/kg)  Protein Requirements: 52 g/d (1.5 g/kg)  Weight Used For Protein Calculations: 34.7 kg (76 lb 8 oz)     Estimated Fluid Requirement Method: other (see comments)(Per MD or 1 mL/kcal)  RDA Method (mL): 1215       Nutrition Prescription Ordered    Current Diet Order: Puree  Current Nutrition Support Formula Ordered: (Discontinued)  Oral Nutrition Supplement: Boost Plus    Evaluation of Received Nutrient/Fluid Intake    Energy Calories Required: not meeting needs  Protein Required: not meeting needs  Fluid Required: other (see comments)(Per MD or 1 mL/kcal)  Comments: LBM 12/30  Tolerance: tolerating  % Intake of Estimated Energy Needs: 0 - 25 %  % Meal Intake: 0 - 25 %    Nutrition Risk    Level of Risk/Frequency of Follow-up: (2x/week)     Assessment and Plan    Malnutrition    Malnutrition in the context of Acute Illness/Injury    Related to (etiology):  Decreased appetite, dysphagia     Signs and Symptoms (as evidenced by):  Energy Intake: <75% of estimated energy requirement for Unsure   Body Fat Depletion: severe depletion of orbitals and triceps   Muscle Mass Depletion: severe depletion of temples, clavicle region, scapular region and lower extremities     Nutrition Diagnosis Status:  Continues        Monitor and Evaluation    Food and Nutrient Intake: energy intake, food and beverage intake, enteral nutrition intake  Food and Nutrient Adminstration: diet order, enteral and parenteral nutrition administration  Physical Activity and Function: nutrition-related ADLs and IADLs  Anthropometric Measurements: weight, weight change  Biochemical Data, Medical Tests and Procedures: lipid profile, inflammatory profile, glucose/endocrine profile, gastrointestinal profile, electrolyte and renal panel  Nutrition-Focused Physical Findings: overall appearance     Nutrition Follow-Up    RD Follow-up?: Yes

## 2018-12-31 NOTE — PT/OT/SLP PROGRESS
Speech Language Pathology Treatment    Patient Name:  Cesilia Delgado   MRN:  [id number]  Admitting Diagnosis: Hypernatremia    Recommendations:                 General Recommendations:  Dysphagia therapy  Diet recommendations:  Puree, Liquid Diet Level: Thin   Aspiration Precautions: 1 bite/sip at a time, Assistance with all PO intake, Feed only when awake/alert, HOB to 90 degrees, Monitor for s/s of aspiration, Monitor for pocketing/holding in oral cavity, Remain upright 30 minutes post all PO inake, Small sips/bites and Standard aspiration precautions  General Precautions: Standard, aspiration, fall  Communication strategies:  provide increased time to answer and go to room if call light pushed    Subjective     Pt with minimal verbal output during session.   Daughter-in-law present and encouraging patient appropriately.    Pain/Comfort:  Pain Rating 1: 0/10  Pain Rating Post-Intervention 1: 0/10    Objective:     Has the patient been evaluated by SLP for swallowing?   Yes  Keep patient NPO? No   Current Respiratory Status: nasal cannula      Pt seen this afternoon following nursing approval.     Pt was lethargic, but roused to participate with stimulation. Ms. Delgado refused trials initially presented by ST.  Daugther-in-law attempted to present boluses with more success.  Pt accepted small sips of Boost via straw x4 and pudding x2.  Oral phase was c/b increased bolus transit time with liquids and brief holding of puree.  Given cues and increased time, pt was able to successfully perform transit.  Initiation of pharyngeal phase was delayed and laryngeal elevation appears somewhat reduced.  Double swallows were inconsistently noted.  No overt s/s of aspiration observed.  Lethargy, cognition, and swallow delay contribute to risk.  Education provided to pt/family regarding ST role, precautions, compensatory strategies to reduce risk and maximize caloric intake, s/s aspiration to monitor for, need for assistance,  monitoring for bolus holding/pocketing and continued ST poc.   Whiteboard update.  MD arrived post  ST session.    Assessment:     Cesilia Delgado is a 88 y.o. female with an SLP diagnosis of Dysphagia.      Goals:   Multidisciplinary Problems     SLP Goals        Problem: SLP Goal    Goal Priority Disciplines Outcome   SLP Goal     SLP Ongoing (interventions implemented as appropriate)   Description:  Goals expected to be met by 1/7/19  1. Pt will tolerate puree diet with no overt s/s of aspiration.  2. Pt will participate in advanced diet trials with no overt s/s of aspiration to determine safest/least restrictive PO diet      Goals expected to be met by 12/31/18:  1. Pt will participate in BSS to determine least restrictive diet.-  MET 12/24/18  2. Pt will tolerate puree diet with no overt s/s of aspiration.- ongoing.  3. Pt will participate in advanced diet trials with no overt s/s of aspiration to determine safest/least restrictive PO diet- ongoing.                          Plan:     · Patient to be seen:  3 x/week   · Plan of Care expires:  01/23/19  · Plan of Care reviewed with:  patient, family   · SLP Follow-Up:  Yes       Discharge recommendations:  nursing facility, skilled (long term) . Palliative care also consulted.       Time Tracking:     SLP Treatment Date:   12/31/18  Speech Start Time:  1300  Speech Stop Time:  1313     Speech Total Time (min):  13 min    Billable Minutes: Treatment Swallowing Dysfunction 13    KIA Barros, CCC-SLP  Speech Language Pathologist  [phone]  12/31/2018

## 2018-12-31 NOTE — PROGRESS NOTES
Progress Note  Hospital Medicine    Admit Date: 12/22/2018  Length of Stay:  LOS: 8 days     SUBJECTIVE:         Follow-up For:  Hypernatremia    HPI/Interval history (See H&P for complete P,F,SHx) :     AAOX 1.  sodium at 149--> 142-->145 -->140poor oral intake around 25% of meals, on pureed diet.  discussed goals of care with POA this AM , who is requesting PEG. palliative care consulted-- POA feels it would be in there best interest of the patient to have PEG place, not agreeing for comfort care .  started on boost  . Hypothermia today.  Rectal temp today 93.3. Bear hugger ordered. Pancultures oredered          Review of Systems: List if applicable  Unable to perform ROS: encephalpathy  improving - AAOX 1 today  denies pain        OBJECTIVE:     Vital Signs Range (Last 24H):  Temp:  [93 °F (33.9 °C)-98.6 °F (37 °C)]   Pulse:  [51-72]   Resp:  [16-17]   BP: (106-163)/()   SpO2:  [95 %-99 %]     Physical Exam:  Physical Exam   Constitutional: No distress.   Severely frail & thin appearing elderly woman   HENT:   Head: Normocephalic and atraumatic.   Facial muscle waisting    Eyes: No scleral icterus.    Neck: Normal range of motion. Neck supple. No JVD present. No thyromegaly present. NGT insitu   Cardiovascular: Normal rate, regular rhythm and normal heart sounds.   Pulmonary/Chest: Effort normal and breath sounds normal. No stridor. No respiratory distress. She has no wheezes. She has no rales.   Abdominal: Soft. Bowel sounds are normal. She exhibits no distension. There is no tenderness. There is no guarding.   Musculoskeletal: She exhibits no edema, tenderness or deformity.   L. Elbow healing bruise    Neurological: She is alert.   encephalpathy  improving - AAOX 1 today  Skin: Skin is warm and dry. She is not diaphoretic.   Psychiatric: She has a normal mood and affect.   Nursing note and vitals reviewed.            Medications:  Medication list was reviewed and changes noted under  Assessment/Plan.      Current Facility-Administered Medications:     amLODIPine tablet 10 mg, 10 mg, Oral, Daily, MATTHEW Mcgraw, 10 mg at 12/30/18 0936    bisacodyl suppository 10 mg, 10 mg, Rectal, Daily PRN, Neda Snider, NP    calcium-vitamin D3 500 mg(1,250mg) -200 unit per tablet 1 tablet, 1 tablet, Per NG tube, Once, MATTHEW Mcgraw    folic acid tablet 1 mg, 1 mg, Oral, Daily, Nabil Brandt MD, 1 mg at 12/30/18 0936    heparin (porcine) injection 5,000 Units, 5,000 Units, Subcutaneous, Q8H, Keri Bangura MD, 5,000 Units at 12/31/18 0517    multivitamin tablet 1 tablet, 1 tablet, Oral, Daily, Nabil Brandt MD, 1 tablet at 12/30/18 0936    sodium chloride 0.9% flush 3 mL, 3 mL, Intravenous, PRN, Keri Bangura MD    thiamine tablet 100 mg, 100 mg, Oral, Daily, Nabil Brandt MD    traZODone tablet 50 mg, 50 mg, Oral, QHS, Keri Bangura MD, 50 mg at 12/30/18 2258    vitamin D 1000 units tablet 1,000 Units, 1,000 Units, Oral, Daily, Nabil Brandt MD, 1,000 Units at 12/30/18 0936    bisacodyl, sodium chloride 0.9%    Laboratory/Diagnostic Data:  Reviewed and noted in plan where applicable- Please see chart for full lab data.    Recent Labs   Lab 12/29/18  0557 12/30/18  0540 12/31/18  0811   WBC 6.02 5.15 3.96   HGB 8.5* 8.5* 8.7*   HCT 25.9* 25.4* 25.5*   * 131* 137*       Recent Labs   Lab 12/29/18  1405 12/30/18  0540 12/30/18  1832 12/31/18  0732 12/31/18  0811    139 139 140  --    K 4.1 3.7 4.5 3.8  --    * 109 110 109  --    CO2 25 25 23 26  --    BUN 35* 28* 28* 23  --    CREATININE 1.9* 1.7* 1.8* 1.6*  --    GLU 85 102 78 77  --    CALCIUM 10.0 9.4 9.9 9.7  --    MG 1.9 1.8  --   --  1.7   PHOS 3.4 3.1  3.1  --  3.3 3.4       Recent Labs   Lab 12/29/18  1405 12/30/18  0540 12/30/18  1832 12/31/18  0732   ALKPHOS 59  --  56  --    ALT 29  --  35  --    AST 34  --  41*  --    ALBUMIN 2.3* 2.2* 2.3* 2.1*   PROT 5.8*  --  5.9*  --    BILITOT 0.4  --   0.3  --         Microbiology labs for the last week  Microbiology Results (last 7 days)     Procedure Component Value Units Date/Time    Blood culture [[id number]] Collected:  12/31/18 0811    Order Status:  Sent Specimen:  Blood Updated:  12/31/18 0829    Narrative:       Collection has been rescheduled by AT3 at 12/31/2018 07:34 Reason:   was able to get one set of blood culture and her renal before her   veins started to blow  Collection has been rescheduled by AT3 at 12/31/2018 07:34 Reason:   was able to get one set of blood culture and her renal before her   veins started to blow    Blood culture [[id number]] Collected:  12/31/18 0732    Order Status:  Sent Specimen:  Blood Updated:  12/31/18 0750           Imaging Results          X-Ray Abdomen Flat And Erect (Final result)  Result time 12/23/18 00:15:32    Final result by Zay Hernadez MD (12/23/18 00:15:32)                 Impression:      Nonobstructed bowel-gas pattern.      Electronically signed by: Zay Hernadez MD  Date:    12/23/2018  Time:    00:15             Narrative:    EXAMINATION:  XR ABDOMEN FLAT AND ERECT    CLINICAL HISTORY:  Constipation, unspecified    TECHNIQUE:  Flat and erect AP views of the abdomen were preformed.    COMPARISON:  None    FINDINGS:  Costochondral calcifications project over the upper abdomen.  Bowel gas pattern is non-obstructive.  No evidence for pneumoperitoneum.  Partially visualized fixation hardware at the right hip.                               CT Head Without Contrast (Final result)  Result time 12/23/18 00:26:39    Final result by Zay Hernadez MD (12/23/18 00:26:39)                 Impression:      1. No evidence of acute intracranial hemorrhage or major vascular distribution infarct.  2. Remote right occipital infarct.  Remote lacunar infarct right basal ganglia.  3. Chronic microvascular ischemic change.  4. Generalized cerebral volume loss.    Electronically signed by resident: Shaun  "Foto  Date:    12/22/2018  Time:    23:59    Electronically signed by: Zay Hernadez MD  Date:    12/23/2018  Time:    00:26             Narrative:    EXAMINATION:  CT HEAD WITHOUT CONTRAST    CLINICAL HISTORY:  Confusion/delirium, altered LOC, unexplained    TECHNIQUE:  Low dose axial images were obtained through the head.  Coronal and sagittal reformations were also performed. Contrast was not administered.    COMPARISON:  MRI brain 06/28/2016, CT head 06/27/2016    FINDINGS:  Age-appropriate generalized cerebral volume loss with compensatory widening of the sulci and ventricular system.  No evidence of hydrocephalus.  No extra-axial blood or fluid collections.    Encephalomalacia of the right paramedian occipital lobe compatible with remote infarct.  Remote lacunar infarct right basal ganglia, unchanged.  Patchy and confluent hypoattenuation of the supratentorial white matter consistent with chronic microvascular ischemic change.    No evidence of acute intracranial hemorrhage or major vascular distribution infarct.  No parenchymal mass or edema.    No calvarial fracture.  Mastoid air cells and paranasal sinuses are clear.                                Estimated body mass index is 13.9 kg/m² as calculated from the following:    Height as of this encounter: 5' 2" (1.575 m).    Weight as of this encounter: 34.5 kg (76 lb).    I & O (Last 24H):    Intake/Output Summary (Last 24 hours) at 12/31/2018 0929  Last data filed at 12/30/2018 1800  Gross per 24 hour   Intake 100 ml   Output 400 ml   Net -300 ml       Estimated Creatinine Clearance: 13.2 mL/min (A) (based on SCr of 1.6 mg/dL (H)).    ASSESSMENT/PLAN:     Active Problems:    Active Hospital Problems    Diagnosis  POA    *Hypernatremia [E87.0]    Na of 174 upon admission  -serial BMPs q 6hrly  -suspect 2/2 dehydration from advanced dementia  -patient down to Na of 152 on d5w at 75 cc/hr   will transition to 400 cc free water boluses   TID(equivalent to 50 " cc/hr x 24hrs)     AAOX 1. Enceophalopathy   sodium at 149--> 142-->145 -->140poor oral intake around 25% of meals, on pureed diet.  discussed goals of care with POA this AM , who is requesting PEG. palliative care consulted-- POA feels it would be in there best interest of the patient to have PEG place, not agreeing for comfort care .  started on boost  . Hypothermia today.  Rectal temp today 93.3. Bear hugger ordered. Pancultures oredered        Hypothermia today.  Rectal temp today 93.3. Bear hugger ordered. Pancultures oredered      Yes    Goals of care, counseling/discussion [Z71.89]full code for now . palliative care consulted   Not Applicable    Insomnia [G47.00]on trazodone  Yes    Dysphagia [R13.10]s/p SLP Evaluation, likely encephalopathy. started clear liquids   NGT placed  and started trickle feeds  - NGT feeds advanced as tolerated   - SLP recommended liquid diet and keeping NGT for now. TF rate (of Isosource 1.5) to 35 mL/hr   - poor oral intake. advanced to puree diet  needs assistance with feeding     Unknown    Electrolyte disturbance [E87.8]as above  Unknown    Caregiver burden [Z63.6]Patient's son  four months ago and family noticed a decline in her mental status since then. NH options for placement  Not Applicable    Mood disturbance [R45.86] R/o depression post sons's demise  Unknown    Acute encephalopathy [G93.40]encephalpathy  improving - AAOX 1 today. likely metabolic enecophalopathy. improving .on thiamine, folate and vitamin D for malnutrition, dietitian consulted for diet, currently on tricklefeeding by NGT, vitamins levels in process.afebrile, non elevated WBC, no UTI on UA . CT head - No evidence of acute intracranial hemorrhage or major vascular distribution infarct. Remote right occipital infarct    Unknown    Malnutrition [E46]Low pre-albumin/ SLP eval. nutrition consulted On NGT feeding Isosource at 35ml  Yes     Chronic    Stage 4 chronic kidney disease  [N18.4]  Yes     Chronic    ODILIA (acute kidney injury) [N17.9]on CKD - Cr 5.6--> 2.5 --1.6 improving with IV hydration. monitor    anemia - Hb 13--> 9.5 microcytic.  likely hemoconcentration. ferrokinetics   Thrombocytopenia 139 monitor  Falls - 2 episodes at home no repoted LOC. obtain CPK - normal   Unknown    Essential hypertension [I10]controlled on amlodipine  Yes     Chronic      Resolved Hospital Problems   No resolved problems to display.         Disposition- SNF. Daughter in law -POA wants to try SNF. palliative care consulted for goals of care     DVT prophylaxis addressed with: SCD            Nabil Brandt MD  Attending Staff Physician  [facility]  pager- [phone] Fwmcxewfbip - [phone]

## 2018-12-31 NOTE — PHYSICIAN QUERY
PT Name: Cesilia Delgado  MR #: [id number]    Physician Query Form - Nutrition Clarification     CDS/: Anh Crouch RN               Contact information:  [phone]    This form is a permanent document in the medical record.     Query Date: December 31, 2018    By submitting this query, we are merely seeking further clarification of documentation.. Please utilize your independent clinical judgment when addressing the question(s) below.    The Medical record contains the following:   Indicators  Supporting Clinical Findings Location in Medical Record   x % of Estimated Energy Intake over a time frame from p.o., TF, or TPN Energy Calories Required: not meeting needs  Protein Required: not meeting needs Nutrition note 12/24   x Weight Status  over a time frame Per chart review, weight ranges from 76-83 pounds x 2 years.  Nutrition note 12/24   x Subcutaneous Fat and/or Muscle Loss Body Fat Depletion: severe depletion of orbitals and triceps   Muscle Mass Depletion: severe depletion of temples, clavicle region, scapular region and lower extremities  Nutrition note 12/24    Fluid Accumulation or Edema      Reduced  Strength     x Wt / BMI / Usual Body Weight Wt 34.7kg  BMI 14  Ht 5'2     BMI Grade: less than 16 protein-energy malnutrition grade III    Nutrition note 12/24    Delayed Wound Healing / Failure to Thrive     x Acute or Chronic Illness CKD, HTN, Dementia  Encephalopathy, ODILIA, dementia     Decreased appetite, dysphagia H&P 12/23        Nutrition note 12/24    Medication     x Treatment receiving TFs at trickle rate via NGT Nutrition note 12/24   x Other  Severely frail & thin appearing elderly woman  Dry mucous membranes   Poor skin turgor   Skin tenting       Malnutrition     NFPE complete, severe malnutrition noted.       CC Med H&P 12/23            Nutrition note 12/24     AND / ASPEN Clinical Characteristics (October 2011)  A minimum of two characteristics is recommended for diagnosing either moderate  or severe malnutrition   Mild Malnutrition Moderate Malnutrition Severe Malnutrition   Energy Intake from p.o., TF or TPN. < 75% intake of estimated energy needs for less than 7 days < 75% intake of estimated energy needs for greater than 7 days < 50% intake of estimated energy needs for > 5 days   Weight Loss 1-2% in 1 month  5% in 3 months  7.5% in 6 months  10% in 1 year 1-2 % in 1 week  5% in 1 month  7.5% in 3 months  10% in 6 months  20% in 1 year > 2% in 1 week  > 5% in 1 month  > 7.5% in 3 months  > 10% in 6 months  > 20% in 1 year   Physical Findings     None *Mild subcutaneous fat and/or muscle loss  *Mild fluid accumulation  *Stage II decubitus  *Surgical wound or non-healing wound *Mod/severe subcutaneous fat and/or muscle loss  *Mod/severe fluid accumulation  *Stage III or IV decubitus  *Non-healing surgical wound     Provider, please specify diagnosis or diagnoses associated with above clinical findings.    [  ] Moderate Protein-Calorie Malnutrition   [ x] Severe Protein-Calorie Malnutrition   [  ] Other Nutritional Diagnosis (please specify):    [  ] Other:    [  ] Clinically Undetermined       Please document in your progress notes daily for the duration of treatment until resolved and include in your discharge summary.

## 2018-12-31 NOTE — PLAN OF CARE
Problem: SLP Goal  Goal: SLP Goal  Goals expected to be met by 12/31/18:  1. Pt will participate in BSS to determine least restrictive diet.-  MET 12/24/18  2. Pt will tolerate puree diet with no overt s/s of aspiration.  3. Pt will participate in advanced diet trials with no overt s/s of aspiration to determine safest/least restrictive PO diet        Pt accepting of minimal po trials with daughter-in-law encouragement.  No overt s/s of aspiration observed.   Recommend continue puree textures with thin liquids and aspiration precautions.  Pt requires assistance with intake. Sit fully upright.  Monitor for holding / pocketing of boluses.  Only feed when fully awake/alert.   Full session note to follow.     KIA Barros, CCC-SLP  Speech Language Pathologist  [phone]  12/31/2018

## 2018-12-31 NOTE — SUBJECTIVE & OBJECTIVE
Interval History:     Past Medical History:   Diagnosis Date    Anemia     Asthma     CKD (chronic kidney disease), stage III     Dementia     Hypertension 5/28/2013    Pelvic mass in female     Pulmonary hypertension        Past Surgical History:   Procedure Laterality Date    ESOPHAGOGASTRODUODENOSCOPY      ESOPHAGOGASTRODUODENOSCOPY (EGD) N/A 12/20/2016    Performed by Eitan Coles MD at [facility] (2ND FLR)    HIP SURGERY Right        Review of patient's allergies indicates:  No Known Allergies    Medications:  Continuous Infusions:  Scheduled Meds:   amLODIPine  10 mg Oral Daily    calcium-vitamin D3  1 tablet Per NG tube Once    folic acid  1 mg Oral Daily    heparin (porcine)  5,000 Units Subcutaneous Q8H    multivitamin  1 tablet Oral Daily    thiamine  100 mg Oral Daily    traZODone  50 mg Oral QHS    vitamin D  1,000 Units Oral Daily     PRN Meds:bisacodyl, sodium chloride 0.9%    Family History     Problem Relation (Age of Onset)    Cancer Sister    Colon polyps Son    Diabetes Sister, Son    Heart disease Sister    Hypertension Mother        Tobacco Use    Smoking status: Former Smoker     Start date: 7/1/2007    Smokeless tobacco: Never Used   Substance and Sexual Activity    Alcohol use: No     Alcohol/week: 0.0 oz    Drug use: No    Sexual activity: Yes     Partners: Male       Review of Systems   Unable to perform ROS: Dementia   Constitutional: Positive for activity change, appetite change and unexpected weight change.   Respiratory: Negative for cough and shortness of breath.    Cardiovascular: Negative for chest pain.   Gastrointestinal: Negative for abdominal distention.   Genitourinary: Negative for difficulty urinating.     Objective:     Vital Signs (Most Recent):  Temp: 98.3 °F (36.8 °C) (12/31/18 1113)  Pulse: 62 (12/31/18 1113)  Resp: 17 (12/31/18 1113)  BP: 116/61 (12/31/18 1113)  SpO2: 95 % (12/31/18 1113) Vital Signs (24h Range):  Temp:  [93 °F (33.9 °C)-98.3  °F (36.8 °C)] 98.3 °F (36.8 °C)  Pulse:  [51-72] 62  Resp:  [16-17] 17  SpO2:  [95 %-99 %] 95 %  BP: (115-163)/() 116/61     Weight: 34.5 kg (76 lb)  Body mass index is 13.9 kg/m².    Review of Symptoms  Symptom Assessment (ESAS 0-10 scale)   ESAS 0 1 2 3 4 5 6 7 8 9 10   Pain x             Dyspnea x             Anxiety x             Nausea x             Depression               Anorexia              Fatigue x             Insomnia              Restlessness               Agitation              CAM / Delirium _x_ --  ___+   Constipation     _x_ --  ___+   Diarrhea           _x_ --  ___+  Bowel Management Plan (BMP): No    Pain Assessment: denies    OME in 24 hours:     Performance Status: 60    ECOG Performance Status Grade: 3 - Confined to bed or chair 50% of waking hours    Physical Exam   Constitutional: She appears well-developed.   Thin female at stated age   Neck: No JVD present.   Cardiovascular: Normal rate.   No murmur heard.  Pulmonary/Chest: Effort normal.   Abdominal: Soft. She exhibits no distension.       Significant Labs: All pertinent labs within the past 24 hours have been reviewed.  CBC:   Recent Labs   Lab 12/31/18  0811   WBC 3.96   HGB 8.7*   HCT 25.5*   MCV 73*   *     BMP:  Recent Labs   Lab 12/31/18  0732 12/31/18  0811   GLU 77  --      --    K 3.8  --      --    CO2 26  --    BUN 23  --    CREATININE 1.6*  --    CALCIUM 9.7  --    MG  --  1.7     LFT:  Lab Results   Component Value Date    AST 41 (H) 12/30/2018    ALKPHOS 56 12/30/2018    BILITOT 0.3 12/30/2018     Albumin:   Albumin   Date Value Ref Range Status   12/31/2018 2.1 (L) 3.5 - 5.2 g/dL Final     Protein:   Total Protein   Date Value Ref Range Status   12/30/2018 5.9 (L) 6.0 - 8.4 g/dL Final     Lactic acid:   Lab Results   Component Value Date    LACTATE 0.9 12/23/2018    LACTATE 1.2 12/23/2018       Significant Imaging: I have reviewed all pertinent imaging results/findings within the past 24  hours.    Advanced Directives::  Living Will: No  LaPOST: No  Do Not Resuscitate Status: No  Medical Power of : Yes. Agent's Name: Chela Barbi    Decision-Making Capacity: Family answered questions    Living Arrangements: Lives with family    Psychosocial/Cultural:  Patient's most important priorities:  Unable to assess    Patient's biggest concerns/fears:  Unable to assess    Previous death/end of life care history:  Unable to assess    Patient's goals/hopes:  Unable to assess    Spiritual:     F- Ivy and Belief: Unable to assess    I - Importance: Unable to assess  .  C - Community: Unable to assess    A - Address in Care: Unable to assess

## 2018-12-31 NOTE — HPI
Cesilia Delgado is a 87 yo lady with a past history of dementia, CKD, HTN who presents with weight loss, fatigue and altered mental status over the past 2-3 weeks, maybe longer.   She has been living with her son and daughter in law for ~ 13 years now and had been quite functional until earlier this year when she started eating less and loosing weight.   Her son had a sudden cardiac event just a few months ago and was found dead in the bed by her daughter in law. Her other siblings and her  had  over the past years as well.     Upon evaluation the pt. Was found to have profound hypernatremia in the 170's. Her severe dehydration was corrected in the ICU, the pt. Was stepped down.     Discussion regarding PEG tube placement have been had and I am asked to discuss goals of care with her and POA.     I met with the pt. And Ms. York, her daughter in law at the bedside today.

## 2018-12-31 NOTE — PLAN OF CARE
KYLER sent updated clinicals to [facility], via [employer]. KYLER also informed the facility that the pt would not be discharging today. KYLER notified Rhonda at [facility] of discharge status and cancelled pt's trip auth through [facility]. KYLER will continue following for discharge needs.    Joseph Mosqueda, HECTOR  [facility]  [phone]

## 2018-12-31 NOTE — ASSESSMENT & PLAN NOTE
"Palliative Care Encounter / Goals of care discussion:     Narrative:   Cesilia Delgado is a 88 y.o. female patient with a recent decline in functional capacity, significant weight loss and poor oral intake with underlying dementia who presents with profound dehycration.     1: Pain / Physical symptom Assessment:   - pain assesment: denies   - dyspnea assessment: denies   - anxiety assessment: denies   - depression assessment: denies    2: Social Background;   - Lives with POA daughter in law. Son (POA )  recently suddenly of a heart attack   - pt. Siblings and  are .    - was performing her ADL several months ago, now is essentially bed bound    3: Palliative care meeting participants:    Met with POA at the bedside       4: Goals of care Discussion details:   Discussed goals and hopes   She tells me she could not "let her starve to death" she is "not ready for hospice to let her die"   We had a long discussion about natural course of dementia and stages of dying   I expressed that her not eating and drinking can be a normal process of aging and is not abnormal at the end of life   I explained that feeding tubes are not known to extend life, improve quality and in fact can shorten life and cause complications like decubitus and aspiration   POA feels that she wants to do everything she can for her mother in law. She promised her late    Even though she does understand the concerns we discussed she would like to pursue PEG placement if possible.    She will however use the holidays to discuss our thoughts and comments with her family and try to "read up on it".    She is not ready to consider comfort care for the pt.        5: Goals of care Decisions / Recommendations / Plan:   Risks and benefits of PEG discussed in detail   POA feels it would be in there best interest of the patient to have PEG placed (she may consider never using it if the pt. Eats enough).    She will "think about it" " until after the new year    6: Follow up plans:    Will follow on Wednesday    Thank you for your consult. I will follow along with you. Please call [phone] with questions.

## 2018-12-31 NOTE — PT/OT/SLP PROGRESS
"Physical Therapy      Patient Name:  Cesilia Delgado   MRN:  [id number]    Patient not seen today secondary to patient lethargic and kept falling back to sleep. Patient opened her eyes and stated " I'm sleeping".  Will follow-up next scheduled visit.    Haris Brantley II, PTA    "

## 2018-12-31 NOTE — CONSULTS
"[facility]  Palliative Medicine  Consult Note    Patient Name: Cesilia Delgado  MRN: [id number]  Admission Date: 2018  Hospital Length of Stay: 8 days  Code Status: Full Code   Attending Provider: Nabil Brandt MD  Consulting Provider: Jerry August MD  Primary Care Physician: Jena Alvarez MD  Principal Problem:Hypernatremia    Patient information was obtained from patient and ER records.      Consults  Assessment/Plan:     Palliative care encounter    Palliative Care Encounter / Goals of care discussion:     Narrative:   Cesilia Delgado is a 88 y.o. female patient with a recent decline in functional capacity, significant weight loss and poor oral intake with underlying dementia who presents with profound dehycration.     1: Pain / Physical symptom Assessment:   - pain assesment: denies   - dyspnea assessment: denies   - anxiety assessment: denies   - depression assessment: denies    2: Social Background;   - Lives with POA daughter in law. Son (POA )  recently suddenly of a heart attack   - pt. Siblings and  are .    - was performing her ADL several months ago, now is essentially bed bound    3: Palliative care meeting participants:    Met with POA at the bedside       4: Goals of care Discussion details:   Discussed goals and hopes   She tells me she could not "let her starve to death" she is "not ready for hospice to let her die"   We had a long discussion about natural course of dementia and stages of dying   I expressed that her not eating and drinking can be a normal process of aging and is not abnormal at the end of life   I explained that feeding tubes are not known to extend life, improve quality and in fact can shorten life and cause complications like decubitus and aspiration   POA feels that she wants to do everything she can for her mother in law. She promised her late    Even though she does understand the concerns we discussed she would " "like to pursue PEG placement if possible.    She will however use the holidays to discuss our thoughts and comments with her family and try to "read up on it".    She is not ready to consider comfort care for the pt.        5: Goals of care Decisions / Recommendations / Plan:   Risks and benefits of PEG discussed in detail   POA feels it would be in there best interest of the patient to have PEG placed (she may consider never using it if the pt. Eats enough).    She will "think about it" until after the new year    6: Follow up plans:    Will follow on Wednesday    Thank you for your consult. I will follow along with you. Please call [phone] with questions.            Thank you for your consult. I will follow-up with patient. Please contact us if you have any additional questions.    Subjective:     HPI:   Cesilia Delgado is a 89 yo lady with a past history of dementia, CKD, HTN who presents with weight loss, fatigue and altered mental status over the past 2-3 weeks, maybe longer.   She has been living with her son and daughter in law for ~ 13 years now and had been quite functional until earlier this year when she started eating less and loosing weight.   Her son had a sudden cardiac event just a few months ago and was found dead in the bed by her daughter in law. Her other siblings and her  had  over the past years as well.     Upon evaluation the pt. Was found to have profound hypernatremia in the 170's. Her severe dehydration was corrected in the ICU, the pt. Was stepped down.     Discussion regarding PEG tube placement have been had and I am asked to discuss goals of care with her and POA.     I met with the pt. And Ms. York, her daughter in law at the bedside today.         Hospital Course:  No notes on file    Interval History:     Past Medical History:   Diagnosis Date    Anemia     Asthma     CKD (chronic kidney disease), stage III     Dementia     Hypertension 2013    Pelvic " mass in female     Pulmonary hypertension        Past Surgical History:   Procedure Laterality Date    ESOPHAGOGASTRODUODENOSCOPY      ESOPHAGOGASTRODUODENOSCOPY (EGD) N/A 12/20/2016    Performed by Eitan Coles MD at [facility] ([address] [place] [address])    HIP SURGERY Right        Review of patient's allergies indicates:  No Known Allergies    Medications:  Continuous Infusions:  Scheduled Meds:   amLODIPine  10 mg Oral Daily    calcium-vitamin D3  1 tablet Per NG tube Once    folic acid  1 mg Oral Daily    heparin (porcine)  5,000 Units Subcutaneous Q8H    multivitamin  1 tablet Oral Daily    thiamine  100 mg Oral Daily    traZODone  50 mg Oral QHS    vitamin D  1,000 Units Oral Daily     PRN Meds:bisacodyl, sodium chloride 0.9%    Family History     Problem Relation (Age of Onset)    Cancer Sister    Colon polyps Son    Diabetes Sister, Son    Heart disease Sister    Hypertension Mother        Tobacco Use    Smoking status: Former Smoker     Start date: 7/1/2007    Smokeless tobacco: Never Used   Substance and Sexual Activity    Alcohol use: No     Alcohol/week: 0.0 oz    Drug use: No    Sexual activity: Yes     Partners: Male       Review of Systems   Unable to perform ROS: Dementia   Constitutional: Positive for activity change, appetite change and unexpected weight change.   Respiratory: Negative for cough and shortness of breath.    Cardiovascular: Negative for chest pain.   Gastrointestinal: Negative for abdominal distention.   Genitourinary: Negative for difficulty urinating.     Objective:     Vital Signs (Most Recent):  Temp: 98.3 °F (36.8 °C) (12/31/18 1113)  Pulse: 62 (12/31/18 1113)  Resp: 17 (12/31/18 1113)  BP: 116/61 (12/31/18 1113)  SpO2: 95 % (12/31/18 1113) Vital Signs (24h Range):  Temp:  [93 °F (33.9 °C)-98.3 °F (36.8 °C)] 98.3 °F (36.8 °C)  Pulse:  [51-72] 62  Resp:  [16-17] 17  SpO2:  [95 %-99 %] 95 %  BP: (115-163)/() 116/61     Weight: 34.5 kg (76 lb)  Body mass index is 13.9  kg/m².    Review of Symptoms  Symptom Assessment (ESAS 0-10 scale)   ESAS 0 1 2 3 4 5 6 7 8 9 10   Pain x             Dyspnea x             Anxiety x             Nausea x             Depression               Anorexia              Fatigue x             Insomnia              Restlessness               Agitation              CAM / Delirium _x_ --  ___+   Constipation     _x_ --  ___+   Diarrhea           _x_ --  ___+  Bowel Management Plan (BMP): No    Pain Assessment: denies    OME in 24 hours:     Performance Status: 60    ECOG Performance Status Grade: 3 - Confined to bed or chair 50% of waking hours    Physical Exam   Constitutional: She appears well-developed.   Thin female at stated age   Neck: No JVD present.   Cardiovascular: Normal rate.   No murmur heard.  Pulmonary/Chest: Effort normal.   Abdominal: Soft. She exhibits no distension.       Significant Labs: All pertinent labs within the past 24 hours have been reviewed.  CBC:   Recent Labs   Lab 12/31/18  0811   WBC 3.96   HGB 8.7*   HCT 25.5*   MCV 73*   *     BMP:  Recent Labs   Lab 12/31/18  0732 12/31/18  0811   GLU 77  --      --    K 3.8  --      --    CO2 26  --    BUN 23  --    CREATININE 1.6*  --    CALCIUM 9.7  --    MG  --  1.7     LFT:  Lab Results   Component Value Date    AST 41 (H) 12/30/2018    ALKPHOS 56 12/30/2018    BILITOT 0.3 12/30/2018     Albumin:   Albumin   Date Value Ref Range Status   12/31/2018 2.1 (L) 3.5 - 5.2 g/dL Final     Protein:   Total Protein   Date Value Ref Range Status   12/30/2018 5.9 (L) 6.0 - 8.4 g/dL Final     Lactic acid:   Lab Results   Component Value Date    LACTATE 0.9 12/23/2018    LACTATE 1.2 12/23/2018       Significant Imaging: I have reviewed all pertinent imaging results/findings within the past 24 hours.    Advanced Directives::  Living Will: No  LaPOST: No  Do Not Resuscitate Status: No  Medical Power of : Yes. Agent's Name: Chela Landon    Decision-Making Capacity: Family  answered questions    Living Arrangements: Lives with family    Psychosocial/Cultural:  Patient's most important priorities:  Unable to assess    Patient's biggest concerns/fears:  Unable to assess    Previous death/end of life care history:  Unable to assess    Patient's goals/hopes:  Unable to assess    Spiritual:     F- Ivy and Belief: Unable to assess    I - Importance: Unable to assess  .  C - Community: Unable to assess    A - Address in Care: Unable to assess      > 50% of 65 min visit spent in chart review, face to face discussion of goals of care,  symptom assessment, coordination of care and emotional support.    Jerry August MD  Palliative Medicine  [facility]

## 2019-01-01 LAB
ALBUMIN SERPL BCP-MCNC: 2.1 G/DL
ANION GAP SERPL CALC-SCNC: 5 MMOL/L
BASOPHILS # BLD AUTO: 0.02 K/UL
BASOPHILS NFR BLD: 0.4 %
BUN SERPL-MCNC: 23 MG/DL
CALCIUM SERPL-MCNC: 9.9 MG/DL
CHLORIDE SERPL-SCNC: 110 MMOL/L
CO2 SERPL-SCNC: 26 MMOL/L
CREAT SERPL-MCNC: 1.8 MG/DL
DIFFERENTIAL METHOD: ABNORMAL
EOSINOPHIL # BLD AUTO: 0.1 K/UL
EOSINOPHIL NFR BLD: 1.5 %
ERYTHROCYTE [DISTWIDTH] IN BLOOD BY AUTOMATED COUNT: 18.7 %
EST. GFR  (AFRICAN AMERICAN): 28.6 ML/MIN/1.73 M^2
EST. GFR  (NON AFRICAN AMERICAN): 24.8 ML/MIN/1.73 M^2
GLUCOSE SERPL-MCNC: 73 MG/DL
HCT VFR BLD AUTO: 24.9 %
HGB BLD-MCNC: 8.2 G/DL
IMM GRANULOCYTES # BLD AUTO: 0.03 K/UL
IMM GRANULOCYTES NFR BLD AUTO: 0.6 %
LYMPHOCYTES # BLD AUTO: 1 K/UL
LYMPHOCYTES NFR BLD: 21.2 %
MAGNESIUM SERPL-MCNC: 1.8 MG/DL
MCH RBC QN AUTO: 25.1 PG
MCHC RBC AUTO-ENTMCNC: 32.9 G/DL
MCV RBC AUTO: 76 FL
MONOCYTES # BLD AUTO: 0.6 K/UL
MONOCYTES NFR BLD: 12.4 %
NEUTROPHILS # BLD AUTO: 3.1 K/UL
NEUTROPHILS NFR BLD: 63.9 %
NRBC BLD-RTO: 0 /100 WBC
PHOSPHATE SERPL-MCNC: 3.5 MG/DL
PHOSPHATE SERPL-MCNC: 3.5 MG/DL
PLATELET # BLD AUTO: 158 K/UL
PMV BLD AUTO: ABNORMAL FL
POCT GLUCOSE: 80 MG/DL (ref 70–110)
POCT GLUCOSE: 86 MG/DL (ref 70–110)
POCT GLUCOSE: 90 MG/DL (ref 70–110)
POTASSIUM SERPL-SCNC: 3.9 MMOL/L
RBC # BLD AUTO: 3.27 M/UL
SODIUM SERPL-SCNC: 141 MMOL/L
VIT B1 BLD-MCNC: 80 UG/L (ref 38–122)
WBC # BLD AUTO: 4.77 K/UL

## 2019-01-01 PROCEDURE — 99232 PR SUBSEQUENT HOSPITAL CARE,LEVL II: ICD-10-PCS | Mod: ,,, | Performed by: INTERNAL MEDICINE

## 2019-01-01 PROCEDURE — 11000001 HC ACUTE MED/SURG PRIVATE ROOM

## 2019-01-01 PROCEDURE — 80069 RENAL FUNCTION PANEL: CPT

## 2019-01-01 PROCEDURE — 25000003 PHARM REV CODE 250: Performed by: STUDENT IN AN ORGANIZED HEALTH CARE EDUCATION/TRAINING PROGRAM

## 2019-01-01 PROCEDURE — 25000003 PHARM REV CODE 250: Performed by: HOSPITALIST

## 2019-01-01 PROCEDURE — 84100 ASSAY OF PHOSPHORUS: CPT

## 2019-01-01 PROCEDURE — 99232 SBSQ HOSP IP/OBS MODERATE 35: CPT | Mod: ,,, | Performed by: INTERNAL MEDICINE

## 2019-01-01 PROCEDURE — 63600175 PHARM REV CODE 636 W HCPCS: Performed by: STUDENT IN AN ORGANIZED HEALTH CARE EDUCATION/TRAINING PROGRAM

## 2019-01-01 PROCEDURE — 83735 ASSAY OF MAGNESIUM: CPT

## 2019-01-01 PROCEDURE — 85025 COMPLETE CBC W/AUTO DIFF WBC: CPT

## 2019-01-01 RX ADMIN — VITAMIN D, TAB 1000IU (100/BT) 1000 UNITS: 25 TAB at 10:01

## 2019-01-01 RX ADMIN — FOLIC ACID 1 MG: 1 TABLET ORAL at 10:01

## 2019-01-01 RX ADMIN — HEPARIN SODIUM 5000 UNITS: 5000 INJECTION, SOLUTION INTRAVENOUS; SUBCUTANEOUS at 07:01

## 2019-01-01 RX ADMIN — HEPARIN SODIUM 5000 UNITS: 5000 INJECTION, SOLUTION INTRAVENOUS; SUBCUTANEOUS at 01:01

## 2019-01-01 RX ADMIN — THERA TABS 1 TABLET: TAB at 10:01

## 2019-01-01 RX ADMIN — HEPARIN SODIUM 5000 UNITS: 5000 INJECTION, SOLUTION INTRAVENOUS; SUBCUTANEOUS at 10:01

## 2019-01-01 RX ADMIN — TRAZODONE HYDROCHLORIDE 50 MG: 50 TABLET ORAL at 10:01

## 2019-01-01 RX ADMIN — AMLODIPINE BESYLATE 10 MG: 10 TABLET ORAL at 10:01

## 2019-01-01 RX ADMIN — Medication 100 MG: at 10:01

## 2019-01-01 NOTE — PLAN OF CARE
Problem: Adult Inpatient Plan of Care  Goal: Plan of Care Review  Outcome: Ongoing (interventions implemented as appropriate)  Pt remains free from and injury of falls during shift. Awake, alert, and oriented to self only. Vital signs stable. No signs of acute distress noted at this time. Bed in lowest position, wheels locked, and bed alarm on. Call light in reach. Will continue to monitor

## 2019-01-01 NOTE — PLAN OF CARE
Problem: Adult Inpatient Plan of Care  Goal: Plan of Care Review  Outcome: Ongoing (interventions implemented as appropriate)  Pt free of fall this shift. No skin breakdown noted. No s/s of infection noted. Pt confuse but still follow commands. Afebrile. Vss. Will continue to monitor pt.

## 2019-01-01 NOTE — PROGRESS NOTES
Hospital Medicine  Progress note    I personally performed the history, physical exam and medical decision making: and confirmed the accuracy of the information in the transcribed note.  Simeon Kaye M.D.  Pager: [phone]  Cell Phone: [phone]    Team: [facility]OS[facility] MED B Simeon Kaye MD  Admit Date: 12/22/2018  IESHA 1/3/2019  Code status: Full Code    Principal Problem:  Hypernatremia    Interval hx: POA wants PEG.      ROS   Unable to perform ROS: encephalpathy  improving - AAOX 1 today  denies pain      PEx  Temp:  [97.3 °F (36.3 °C)-98 °F (36.7 °C)]   Pulse:  [66-75]   Resp:  [15-20]   BP: ()/(58-66)   SpO2:  [98 %]     Intake/Output Summary (Last 24 hours) at 1/1/2019 1625  Last data filed at 12/31/2018 1741  Gross per 24 hour   Intake 150 ml   Output 25 ml   Net 125 ml     Constitutional: No distress.   Severely frail & thin appearing elderly woman   HENT:   Head: Normocephalic and atraumatic.   Facial muscle waisting    Eyes: No scleral icterus.    Neck: Normal range of motion. Neck supple. No JVD present. No thyromegaly present. NGT insitu   Cardiovascular: Normal rate, regular rhythm and normal heart sounds.   Pulmonary/Chest: Effort normal and breath sounds normal. No stridor. No respiratory distress. She has no wheezes. She has no rales.   Abdominal: Soft. Bowel sounds are normal. She exhibits no distension. There is no tenderness. There is no guarding.   Musculoskeletal: She exhibits no edema, tenderness or deformity.   L. Elbow healing bruise    Neurological: She is alert.   encephalpathy  improving - AAOX 1 today  Skin: Skin is warm and dry. She is not diaphoretic.   Psychiatric: She has a normal mood and affect.     Recent Labs   Lab 12/30/18  0540 12/31/18  0811 01/01/19  0619   WBC 5.15 3.96 4.77   HGB 8.5* 8.7* 8.2*   HCT 25.4* 25.5* 24.9*   * 137* 158     Recent Labs   Lab 12/30/18  0540 12/30/18  1832 12/31/18  0732 12/31/18  0811 01/01/19  0619    139 140  --  141   K 3.7  4.5 3.8  --  3.9    110 109  --  110   CO2 25 23 26  --  26   BUN 28* 28* 23  --  23   CREATININE 1.7* 1.8* 1.6*  --  1.8*    78 77  --  73   CALCIUM 9.4 9.9 9.7  --  9.9   MG 1.8  --   --  1.7 1.8   PHOS 3.1  3.1  --  3.3 3.4 3.5  3.5     Recent Labs   Lab 12/29/18  1405  12/30/18  1832 12/31/18  0732 01/01/19  0619   ALKPHOS 59  --  56  --   --    ALT 29  --  35  --   --    AST 34  --  41*  --   --    ALBUMIN 2.3*   < > 2.3* 2.1* 2.1*   PROT 5.8*  --  5.9*  --   --    BILITOT 0.4  --  0.3  --   --     < > = values in this interval not displayed.      Recent Labs   Lab 12/31/18  0531 12/31/18  1143 12/31/18  1728 12/31/18  1730 01/01/19  0714 01/01/19  1140   POCTGLUCOSE 91 117* 67* 112* 86 80     No results for input(s): CPK, CPKMB, MB, TROPONINI in the last 72 hours.    Scheduled Meds:   amLODIPine  10 mg Oral Daily    calcium-vitamin D3  1 tablet Per NG tube Once    folic acid  1 mg Oral Daily    heparin (porcine)  5,000 Units Subcutaneous Q8H    multivitamin  1 tablet Oral Daily    thiamine  100 mg Oral Daily    traZODone  50 mg Oral QHS    vitamin D  1,000 Units Oral Daily     Continuous Infusions:  As Needed:  bisacodyl, sodium chloride 0.9%    Active Hospital Problems    Diagnosis  POA    Palliative care encounter [Z51.5]  Not Applicable    Goals of care, counseling/discussion [Z71.89]  Not Applicable    Insomnia [G47.00]  Yes    Dysphagia [R13.10]  Yes    Electrolyte disturbance [E87.8]  Yes    Caregiver burden [Z63.6]  Not Applicable    Mood disturbance [R45.86]  Yes    Malnutrition [E46]  Yes     Chronic    Essential hypertension [I10]  Yes     Chronic      Resolved Hospital Problems    Diagnosis Date Resolved POA    *Hypernatremia [E87.0] 12/28/2018 Yes    Acute encephalopathy [G93.40] 12/28/2018 No    Stage 4 chronic kidney disease [N18.4] 12/28/2018 Yes     Chronic    ODILIA (acute kidney injury) [N17.9] 12/28/2018 Yes       Overview      Assessment and Plan for  Problems addressed today:    Hypernatremia  · Na 174 upon admission  · Serial BMPs q6hr  · Suspect 2/2 dehydration from advanced dementia  · Patient down to Na 141on d5w at 75cc/hr    Insomnia  · On trazodone    Dysphagia  · S/P SLP evaluation, likely encephalopathy  · Started clear liquids  · NGT placed 12/23 and started trickle feeds  · NGT feeds advanced as tolerated  · Poor oral intake, advanced to puree diet    Mood disturbance  · R/O depression post son's demise    Acute encephalopathy   · Improving  · AAOx1 today  · Likely metabolic  · On thiamine, folate and vitamin D for malnutrition   · Dietician consulted  · CT head - no evidence of acute intracranial hemorrhage    Malnutrition  · Dietician consulted  · Low prealbumin    Acute on chronic kidney disease  · Improving with IV hydration    Essential hypertension  · Controlled on amlodipine      Discharge plan and follow up    Provider    I personally scribed for Simeon Kaye MD on 01/01/2019 at 1:42 PM. Electronically signed by sadia Borrero III on 01/01/2019 at 1:42 PM

## 2019-01-01 NOTE — MEDICAL/APP STUDENT
Hospital Medicine  Progress note    Team: [facility]OSP MED B Derrek Borrero  Admit Date: 12/22/2018  IESHA 1/3/2019  Code status: Full Code    Principal Problem:  Hypernatremia    Interval hx: POA wants PEG.      ROS   Unable to perform ROS: encephalpathy  improving - AAOX 1 today  denies pain      PEx  Temp:  [97.3 °F (36.3 °C)-98.3 °F (36.8 °C)]   Pulse:  [62-75]   Resp:  [15-18]   BP: ()/(54-65)   SpO2:  [95 %-98 %]     Intake/Output Summary (Last 24 hours) at 1/1/2019 0846  Last data filed at 12/31/2018 1741  Gross per 24 hour   Intake 150 ml   Output 325 ml   Net -175 ml     Constitutional: No distress.   Severely frail & thin appearing elderly woman   HENT:   Head: Normocephalic and atraumatic.   Facial muscle waisting    Eyes: No scleral icterus.    Neck: Normal range of motion. Neck supple. No JVD present. No thyromegaly present. NGT insitu   Cardiovascular: Normal rate, regular rhythm and normal heart sounds.   Pulmonary/Chest: Effort normal and breath sounds normal. No stridor. No respiratory distress. She has no wheezes. She has no rales.   Abdominal: Soft. Bowel sounds are normal. She exhibits no distension. There is no tenderness. There is no guarding.   Musculoskeletal: She exhibits no edema, tenderness or deformity.   L. Elbow healing bruise    Neurological: She is alert.   encephalpathy  improving - AAOX 1 today  Skin: Skin is warm and dry. She is not diaphoretic.   Psychiatric: She has a normal mood and affect.     Recent Labs   Lab 12/30/18  0540 12/31/18  0811 01/01/19  0619   WBC 5.15 3.96 4.77   HGB 8.5* 8.7* 8.2*   HCT 25.4* 25.5* 24.9*   * 137* 158     Recent Labs   Lab 12/30/18  0540 12/30/18  1832 12/31/18  0732 12/31/18  0811 01/01/19  0619    139 140  --  141   K 3.7 4.5 3.8  --  3.9    110 109  --  110   CO2 25 23 26  --  26   BUN 28* 28* 23  --  23   CREATININE 1.7* 1.8* 1.6*  --  1.8*    78 77  --  73   CALCIUM 9.4 9.9 9.7  --  9.9   MG 1.8  --   --  1.7 1.8    PHOS 3.1  3.1  --  3.3 3.4 3.5  3.5     Recent Labs   Lab 12/29/18  1405  12/30/18  1832 12/31/18  0732 01/01/19  0619   ALKPHOS 59  --  56  --   --    ALT 29  --  35  --   --    AST 34  --  41*  --   --    ALBUMIN 2.3*   < > 2.3* 2.1* 2.1*   PROT 5.8*  --  5.9*  --   --    BILITOT 0.4  --  0.3  --   --     < > = values in this interval not displayed.      Recent Labs   Lab 12/30/18  2316 12/31/18  0531 12/31/18  1143 12/31/18  1728 12/31/18  1730 01/01/19  0714   POCTGLUCOSE 106 91 117* 67* 112* 86     No results for input(s): CPK, CPKMB, MB, TROPONINI in the last 72 hours.    Scheduled Meds:   amLODIPine  10 mg Oral Daily    calcium-vitamin D3  1 tablet Per NG tube Once    folic acid  1 mg Oral Daily    heparin (porcine)  5,000 Units Subcutaneous Q8H    multivitamin  1 tablet Oral Daily    thiamine  100 mg Oral Daily    traZODone  50 mg Oral QHS    vitamin D  1,000 Units Oral Daily     Continuous Infusions:  As Needed:  bisacodyl, sodium chloride 0.9%    Active Hospital Problems    Diagnosis  POA    Palliative care encounter [Z51.5]  Not Applicable    Goals of care, counseling/discussion [Z71.89]  Not Applicable    Insomnia [G47.00]  Yes    Dysphagia [R13.10]  Yes    Electrolyte disturbance [E87.8]  Yes    Caregiver burden [Z63.6]  Not Applicable    Mood disturbance [R45.86]  Yes    Malnutrition [E46]  Yes     Chronic    Essential hypertension [I10]  Yes     Chronic      Resolved Hospital Problems    Diagnosis Date Resolved POA    *Hypernatremia [E87.0] 12/28/2018 Yes    Acute encephalopathy [G93.40] 12/28/2018 No    Stage 4 chronic kidney disease [N18.4] 12/28/2018 Yes     Chronic    ODILIA (acute kidney injury) [N17.9] 12/28/2018 Yes       Overview      Assessment and Plan for Problems addressed today:    Hypernatremia  · Na 174 upon admission  · Serial BMPs q6hr  · Suspect 2/2 dehydration from advanced dementia  · Patient down to Na 141on d5w at 75cc/hr    Insomnia  · On  trazodone    Dysphagia  · S/P SLP evaluation, likely encephalopathy  · Started clear liquids  · NGT placed 12/23 and started trickle feeds  · NGT feeds advanced as tolerated  · Poor oral intake, advanced to puree diet    Mood disturbance  · R/O depression post son's demise    Acute encephalopathy   · Improving  · AAOx1 today  · Likely metabolic  · On thiamine, folate and vitamin D for malnutrition   · Dietician consulted  · CT head - no evidence of acute intracranial hemorrhage    Malnutrition  · Dietician consulted  · Low prealbumin    Acute on chronic kidney disease  · Improving with IV hydration    Essential hypertension  · Controlled on amlodipine      Discharge plan and follow up    Provider    I personally scribed for Simeon Kaye MD on 01/01/2019 at 1:42 PM. Electronically signed by sadia Borrero III on 01/01/2019 at 1:42 PM

## 2019-01-02 ENCOUNTER — TELEPHONE (OUTPATIENT)
Dept: INTERNAL MEDICINE | Facility: CLINIC | Age: 84
End: 2019-01-02

## 2019-01-02 LAB
ALBUMIN SERPL BCP-MCNC: 2.4 G/DL
ANION GAP SERPL CALC-SCNC: 9 MMOL/L
BASOPHILS # BLD AUTO: 0.02 K/UL
BASOPHILS NFR BLD: 0.3 %
BUN SERPL-MCNC: 22 MG/DL
CALCIUM SERPL-MCNC: 10.3 MG/DL
CHLORIDE SERPL-SCNC: 110 MMOL/L
CO2 SERPL-SCNC: 24 MMOL/L
CREAT SERPL-MCNC: 2 MG/DL
DIFFERENTIAL METHOD: ABNORMAL
EOSINOPHIL # BLD AUTO: 0 K/UL
EOSINOPHIL NFR BLD: 0.7 %
ERYTHROCYTE [DISTWIDTH] IN BLOOD BY AUTOMATED COUNT: 18.9 %
EST. GFR  (AFRICAN AMERICAN): 25.1 ML/MIN/1.73 M^2
EST. GFR  (NON AFRICAN AMERICAN): 21.8 ML/MIN/1.73 M^2
GLUCOSE SERPL-MCNC: 61 MG/DL
HCT VFR BLD AUTO: 25.5 %
HGB BLD-MCNC: 8.3 G/DL
IMM GRANULOCYTES # BLD AUTO: 0.02 K/UL
IMM GRANULOCYTES NFR BLD AUTO: 0.3 %
LYMPHOCYTES # BLD AUTO: 1.2 K/UL
LYMPHOCYTES NFR BLD: 20.2 %
MAGNESIUM SERPL-MCNC: 1.6 MG/DL
MCH RBC QN AUTO: 24.3 PG
MCHC RBC AUTO-ENTMCNC: 32.5 G/DL
MCV RBC AUTO: 75 FL
MONOCYTES # BLD AUTO: 0.5 K/UL
MONOCYTES NFR BLD: 9.1 %
NEUTROPHILS # BLD AUTO: 4.1 K/UL
NEUTROPHILS NFR BLD: 69.4 %
NRBC BLD-RTO: 0 /100 WBC
PHOSPHATE SERPL-MCNC: 3 MG/DL
PHOSPHATE SERPL-MCNC: 3 MG/DL
PLATELET # BLD AUTO: 179 K/UL
PMV BLD AUTO: ABNORMAL FL
POCT GLUCOSE: 295 MG/DL (ref 70–110)
POCT GLUCOSE: 63 MG/DL (ref 70–110)
POCT GLUCOSE: 65 MG/DL (ref 70–110)
POCT GLUCOSE: 75 MG/DL (ref 70–110)
POCT GLUCOSE: 85 MG/DL (ref 70–110)
POTASSIUM SERPL-SCNC: 4.1 MMOL/L
RBC # BLD AUTO: 3.41 M/UL
SODIUM SERPL-SCNC: 143 MMOL/L
WBC # BLD AUTO: 5.85 K/UL

## 2019-01-02 PROCEDURE — 11000001 HC ACUTE MED/SURG PRIVATE ROOM

## 2019-01-02 PROCEDURE — 25000003 PHARM REV CODE 250: Performed by: HOSPITALIST

## 2019-01-02 PROCEDURE — 80069 RENAL FUNCTION PANEL: CPT

## 2019-01-02 PROCEDURE — 99232 SBSQ HOSP IP/OBS MODERATE 35: CPT | Mod: ,,, | Performed by: INTERNAL MEDICINE

## 2019-01-02 PROCEDURE — 97535 SELF CARE MNGMENT TRAINING: CPT

## 2019-01-02 PROCEDURE — 36415 COLL VENOUS BLD VENIPUNCTURE: CPT

## 2019-01-02 PROCEDURE — 25000003 PHARM REV CODE 250: Performed by: STUDENT IN AN ORGANIZED HEALTH CARE EDUCATION/TRAINING PROGRAM

## 2019-01-02 PROCEDURE — 99232 PR SUBSEQUENT HOSPITAL CARE,LEVL II: ICD-10-PCS | Mod: ,,, | Performed by: INTERNAL MEDICINE

## 2019-01-02 PROCEDURE — 25000003 PHARM REV CODE 250: Performed by: INTERNAL MEDICINE

## 2019-01-02 PROCEDURE — 97530 THERAPEUTIC ACTIVITIES: CPT

## 2019-01-02 PROCEDURE — 85025 COMPLETE CBC W/AUTO DIFF WBC: CPT

## 2019-01-02 PROCEDURE — 84100 ASSAY OF PHOSPHORUS: CPT

## 2019-01-02 PROCEDURE — 83735 ASSAY OF MAGNESIUM: CPT

## 2019-01-02 PROCEDURE — 63600175 PHARM REV CODE 636 W HCPCS: Performed by: STUDENT IN AN ORGANIZED HEALTH CARE EDUCATION/TRAINING PROGRAM

## 2019-01-02 PROCEDURE — S5010 5% DEXTROSE AND 0.45% SALINE: HCPCS | Performed by: INTERNAL MEDICINE

## 2019-01-02 RX ORDER — DEXTROSE 25 % IN WATER 25 %
25 SYRINGE (ML) INTRAVENOUS ONCE
Status: DISCONTINUED | OUTPATIENT
Start: 2019-01-02 | End: 2019-01-02

## 2019-01-02 RX ORDER — DEXTROSE MONOHYDRATE AND SODIUM CHLORIDE 5; .45 G/100ML; G/100ML
INJECTION, SOLUTION INTRAVENOUS CONTINUOUS
Status: DISCONTINUED | OUTPATIENT
Start: 2019-01-02 | End: 2019-01-08

## 2019-01-02 RX ADMIN — FOLIC ACID 1 MG: 1 TABLET ORAL at 09:01

## 2019-01-02 RX ADMIN — TRAZODONE HYDROCHLORIDE 50 MG: 50 TABLET ORAL at 08:01

## 2019-01-02 RX ADMIN — DEXTROSE MONOHYDRATE 25 G: 25 INJECTION, SOLUTION INTRAVENOUS at 07:01

## 2019-01-02 RX ADMIN — DEXTROSE MONOHYDRATE 25 G: 25 INJECTION, SOLUTION INTRAVENOUS at 06:01

## 2019-01-02 RX ADMIN — HEPARIN SODIUM 5000 UNITS: 5000 INJECTION, SOLUTION INTRAVENOUS; SUBCUTANEOUS at 08:01

## 2019-01-02 RX ADMIN — HEPARIN SODIUM 5000 UNITS: 5000 INJECTION, SOLUTION INTRAVENOUS; SUBCUTANEOUS at 02:01

## 2019-01-02 RX ADMIN — THERA TABS 1 TABLET: TAB at 09:01

## 2019-01-02 RX ADMIN — Medication 100 MG: at 09:01

## 2019-01-02 RX ADMIN — DEXTROSE AND SODIUM CHLORIDE: 5; .45 INJECTION, SOLUTION INTRAVENOUS at 08:01

## 2019-01-02 RX ADMIN — HEPARIN SODIUM 5000 UNITS: 5000 INJECTION, SOLUTION INTRAVENOUS; SUBCUTANEOUS at 07:01

## 2019-01-02 RX ADMIN — VITAMIN D, TAB 1000IU (100/BT) 1000 UNITS: 25 TAB at 09:01

## 2019-01-02 NOTE — PLAN OF CARE
Problem: Adult Inpatient Plan of Care  Goal: Plan of Care Review  Outcome: Ongoing (interventions implemented as appropriate)  Patient AAOx4. Patient VSS. No s/s of pain and discomfort noted. Patient free from falls or injury during shift. Patient repositioned every two hours. Patient in bed, bed in lowest position, call light in reach, bed alarm set, and personal items at bedside. Will continue to monitor.

## 2019-01-02 NOTE — PROGRESS NOTES
Hospital Medicine  Progress note    I personally performed the history, physical exam and medical decision making: and confirmed the accuracy of the information in the transcribed note.  Simeon Kaye M.D.  Pager: [phone]  Cell Phone: [phone]    Team: [facility] Simeon Kaye MD  Admit Date: 12/22/2018  IESHA 1/4/2019  Code status: Full Code    Principal Problem:  Hypernatremia    Interval hx: POA wants PEG. Palliative care saw patient and stated POA not available by phone.     ROS   Unable to perform ROS: encephalpathy  improving - AAOX 1 today  denies pain      PEx  Temp:  [97.7 °F (36.5 °C)-99.1 °F (37.3 °C)]   Pulse:  [63-80]   Resp:  [12-16]   BP: (108-160)/(53-70)   SpO2:  [98 %]   No intake or output data in the 24 hours ending 01/02/19 1452  Constitutional: No distress.   Severely frail & thin appearing elderly woman   HENT:   Head: Normocephalic and atraumatic.   Facial muscle waisting    Eyes: No scleral icterus.    Neck: Normal range of motion. Neck supple. No JVD present. No thyromegaly present. NGT insitu   Cardiovascular: Normal rate, regular rhythm and normal heart sounds.   Pulmonary/Chest: Effort normal and breath sounds normal. No stridor. No respiratory distress. She has no wheezes. She has no rales.   Abdominal: Soft. Bowel sounds are normal. She exhibits no distension. There is no tenderness. There is no guarding.   Musculoskeletal: She exhibits no edema, tenderness or deformity.   L. Elbow healing bruise    Neurological: She is alert.   encephalpathy  improving - AAOX 1 today  Skin: Skin is warm and dry. She is not diaphoretic.   Psychiatric: She has a normal mood and affect.     Recent Labs   Lab 12/31/18  0811 01/01/19  0619 01/02/19  0657   WBC 3.96 4.77 5.85   HGB 8.7* 8.2* 8.3*   HCT 25.5* 24.9* 25.5*   * 158 179     Recent Labs   Lab 12/31/18  0732 12/31/18  0811 01/01/19  0619 01/02/19  0657     --  141 143   K 3.8  --  3.9 4.1     --  110 110   CO2 26  --   26 24   BUN 23  --  23 22   CREATININE 1.6*  --  1.8* 2.0*   GLU 77  --  73 61*   CALCIUM 9.7  --  9.9 10.3   MG  --  1.7 1.8 1.6   PHOS 3.3 3.4 3.5  3.5 3.0  3.0     Recent Labs   Lab 12/29/18  1405  12/30/18  1832 12/31/18  0732 01/01/19  0619 01/02/19  0657   ALKPHOS 59  --  56  --   --   --    ALT 29  --  35  --   --   --    AST 34  --  41*  --   --   --    ALBUMIN 2.3*   < > 2.3* 2.1* 2.1* 2.4*   PROT 5.8*  --  5.9*  --   --   --    BILITOT 0.4  --  0.3  --   --   --     < > = values in this interval not displayed.      Recent Labs   Lab 12/31/18  1730 01/01/19  0714 01/01/19  1140 01/01/19  1851 01/02/19  0724 01/02/19  1229   POCTGLUCOSE 112* 86 80 90 65* 75     No results for input(s): CPK, CPKMB, MB, TROPONINI in the last 72 hours.    Scheduled Meds:   amLODIPine  10 mg Oral Daily    calcium-vitamin D3  1 tablet Per NG tube Once    folic acid  1 mg Oral Daily    heparin (porcine)  5,000 Units Subcutaneous Q8H    multivitamin  1 tablet Oral Daily    thiamine  100 mg Oral Daily    traZODone  50 mg Oral QHS    vitamin D  1,000 Units Oral Daily     Continuous Infusions:  As Needed:  bisacodyl, sodium chloride 0.9%    Active Hospital Problems    Diagnosis  POA    Palliative care encounter [Z51.5]  Not Applicable    Goals of care, counseling/discussion [Z71.89]  Not Applicable    Insomnia [G47.00]  Yes    Dysphagia [R13.10]  Yes    Electrolyte disturbance [E87.8]  Yes    Caregiver burden [Z63.6]  Not Applicable    Mood disturbance [R45.86]  Yes    Malnutrition [E46]  Yes     Chronic    Essential hypertension [I10]  Yes     Chronic      Resolved Hospital Problems    Diagnosis Date Resolved POA    *Hypernatremia [E87.0] 12/28/2018 Yes    Acute encephalopathy [G93.40] 12/28/2018 No    Stage 4 chronic kidney disease [N18.4] 12/28/2018 Yes     Chronic    ODILIA (acute kidney injury) [N17.9] 12/28/2018 Yes       Overview      Assessment and Plan for Problems addressed today:    Hypernatremia  · Na  174 upon admission  · Serial BMPs q6hr  · Suspect 2/2 dehydration from advanced dementia  · Patient down to Na 141on d5w at 75cc/hr    Insomnia  · On trazodone    Dysphagia  · S/P SLP evaluation, likely encephalopathy  · Started clear liquids  · NGT placed 12/23 and started trickle feeds  · NGT feeds advanced as tolerated  · Poor oral intake, advanced to puree diet    Mood disturbance  · R/O depression post son's demise    Acute encephalopathy   · Improving  · AAOx1 today  · Likely metabolic  · On thiamine, folate and vitamin D for malnutrition   · Dietician consulted  · CT head - no evidence of acute intracranial hemorrhage    Malnutrition  · Dietician consulted  · Low prealbumin    Acute on chronic kidney disease  · Improving with IV hydration    Essential hypertension  · Controlled on amlodipine      Discharge plan and follow up    Provider    I personally scribed for Simeon Kaye MD on 01/02/2019 at 2:19 PM. Electronically signed by sadia Borrero III on 01/02/2019 at 2:19 PM

## 2019-01-02 NOTE — SUBJECTIVE & OBJECTIVE
Interval History: Pt. Non verbal, sleeping comfortably today. No family at the bedside    Past Medical History:   Diagnosis Date    Anemia     Asthma     CKD (chronic kidney disease), stage III     Dementia     Hypertension 5/28/2013    Pelvic mass in female     Pulmonary hypertension        Past Surgical History:   Procedure Laterality Date    ESOPHAGOGASTRODUODENOSCOPY      ESOPHAGOGASTRODUODENOSCOPY (EGD) N/A 12/20/2016    Performed by Eitan Coles MD at [facility] ([address] [place] [address])    HIP SURGERY Right        Review of patient's allergies indicates:  No Known Allergies    Medications:  Continuous Infusions:  Scheduled Meds:   amLODIPine  10 mg Oral Daily    calcium-vitamin D3  1 tablet Per NG tube Once    folic acid  1 mg Oral Daily    heparin (porcine)  5,000 Units Subcutaneous Q8H    multivitamin  1 tablet Oral Daily    thiamine  100 mg Oral Daily    traZODone  50 mg Oral QHS    vitamin D  1,000 Units Oral Daily     PRN Meds:bisacodyl, sodium chloride 0.9%    Family History     Problem Relation (Age of Onset)    Cancer Sister    Colon polyps Son    Diabetes Sister, Son    Heart disease Sister    Hypertension Mother        Tobacco Use    Smoking status: Former Smoker     Start date: 7/1/2007    Smokeless tobacco: Never Used   Substance and Sexual Activity    Alcohol use: No     Alcohol/week: 0.0 oz    Drug use: No    Sexual activity: Yes     Partners: Male       Review of Systems  Objective:     Vital Signs (Most Recent):  Temp: 98.2 °F (36.8 °C) (01/02/19 0815)  Pulse: 71 (01/02/19 1109)  Resp: 12 (01/02/19 0815)  BP: (!) 110/53 (01/02/19 0815)  SpO2: 98 % (01/02/19 0815) Vital Signs (24h Range):  Temp:  [97.7 °F (36.5 °C)-99.1 °F (37.3 °C)] 98.2 °F (36.8 °C)  Pulse:  [63-80] 71  Resp:  [12-16] 12  SpO2:  [98 %] 98 %  BP: (108-160)/(53-61) 110/53     Weight: 34.5 kg (76 lb)  Body mass index is 13.9 kg/m².    Review of Symptoms    Unable to obtain due to AMS.     Symptom Assessment (ESAS 0-10  scale)   ESAS 0 1 2 3 4 5 6 7 8 9 10   Pain x             Dyspnea x             Anxiety x             Nausea x             Depression               Anorexia              Fatigue x             Insomnia              Restlessness               Agitation              CAM / Delirium _x_ --  ___+   Constipation     _x_ --  ___+   Diarrhea           _x_ --  ___+  Bowel Management Plan (BMP): No    Pain Assessment: denies    OME in 24 hours:     Performance Status: 40    ECOG Performance Status Grade: bedbound    Physical Exam   Constitutional: She appears well-developed.   Thin female at stated age   Neck: No JVD present.   Cardiovascular: Normal rate.   No murmur heard.  Pulmonary/Chest: Effort normal.   Abdominal: Soft. She exhibits no distension.       Significant Labs: All pertinent labs within the past 24 hours have been reviewed.  CBC:   Recent Labs   Lab 01/02/19  0657   WBC 5.85   HGB 8.3*   HCT 25.5*   MCV 75*        BMP:  Recent Labs   Lab 01/02/19  0657   GLU 61*      K 4.1      CO2 24   BUN 22   CREATININE 2.0*   CALCIUM 10.3   MG 1.6     LFT:  Lab Results   Component Value Date    AST 41 (H) 12/30/2018    ALKPHOS 56 12/30/2018    BILITOT 0.3 12/30/2018     Albumin:   Albumin   Date Value Ref Range Status   01/02/2019 2.4 (L) 3.5 - 5.2 g/dL Final     Protein:   Total Protein   Date Value Ref Range Status   12/30/2018 5.9 (L) 6.0 - 8.4 g/dL Final     Lactic acid:   Lab Results   Component Value Date    LACTATE 0.9 12/23/2018    LACTATE 1.2 12/23/2018       Significant Imaging: I have reviewed all pertinent imaging results/findings within the past 24 hours.    Advanced Directives::  Living Will: No  LaPOST: No  Do Not Resuscitate Status: No  Medical Power of : Yes. Agent's Name: Chela Barbi    Decision-Making Capacity: Family answered questions    Living Arrangements: Lives with family    Psychosocial/Cultural:  Patient's most important priorities:  Unable to assess    Patient's biggest  concerns/fears:  Unable to assess    Previous death/end of life care history:  Unable to assess    Patient's goals/hopes:  Unable to assess    Spiritual:     F- Ivy and Belief: Unable to assess    I - Importance: Unable to assess  .  C - Community: Unable to assess    A - Address in Care: Unable to assess

## 2019-01-02 NOTE — PT/OT/SLP PROGRESS
Physical Therapy Treatment    Patient Name:  Cesilia Delgado   MRN:  [id number]    Recommendations:     Discharge Recommendations:  nursing facility, skilled   Discharge Equipment Recommendations: hospital bed, wheelchair, manual   Barriers to discharge: Inaccessible home, Decreased caregiver support and cognitive decline and functional limitations    Assessment:     Cesilia Delgado is a 88 y.o. female admitted with a medical diagnosis of Hypernatremia.  She presents with the following impairments/functional limitations:  weakness, impaired endurance, impaired functional mobilty, gait instability, impaired sensation, impaired cognition, decreased lower extremity function, decreased upper extremity function, decreased coordination, impaired balance, impaired self care skills, decreased safety awareness Patient tolerated tx  poorly. Patient limited at this time by increased delay for all command following. Pt able to follow 1 step commands 15% of time. Pt with incoherant speech at times but able to answer yes or no questions  50% of time. Overall pt shows poor progression with functional mobility. frequency decreased to 3x per week 2/2 decline in improvement. Attempt to teach/demo LE TE EOB with no indication of learning. Pt has still not met any goals established.  Patient will continue to require skilled PT services to address the above impairments to return to prior level of function as independent as possible. Discharge recommendation  to skilled nursing facility  to maximize functional mobility, safety and decrease caregiver burden prior to returning home.     .    Rehab Prognosis: Poor; patient would benefit from acute skilled PT services to address these deficits and reach maximum level of function.    Recent Surgery: * No surgery found *      Plan:     During this hospitalization, patient to be seen 3 x/week to address the identified rehab impairments via therapeutic activities, gait training, therapeutic exercises,  neuromuscular re-education and progress toward the following goals:    · Plan of Care Expires:  01/25/19    Subjective     Chief Complaint: none stated; unable to understand  Patient/Family Comments/goals: none  Pain/Comfort:  · Pain Rating 1: 0/10  · Pain Rating Post-Intervention 1: 0/10      Objective:     Communicated with RN  prior to session.  Patient found supine with  telemetry  upon PT entry to room.     General Precautions: Standard, aspiration, fall   Orthopedic Precautions:N/A   Braces: N/A     Functional Mobility:  · Bed Mobility:  Rolling Left:  maximal assistance  · Supine to Sit: maximal assistance  · Sit to Supine: maximal assistance  · Transfers:  Sit to Stand:  total assistance with no AD, hand-held assist and PT faciliating at hips for ext while blocking B knees   · Gait: x 10 feet fwd with BLE blocked in stance phase and WS facilaiting at hips; after gait x 10 feet pt unable to return to bed so PT picked PT up at brought back to bed total A  · Balance: sitting balance SBA x 10 min with 2-3 episodes of LOB with min A to correct; static/dynamic standing max A       AM-PAC 6 CLICK MOBILITY  Turning over in bed (including adjusting bedclothes, sheets and blankets)?: 2  Sitting down on and standing up from a chair with arms (e.g., wheelchair, bedside commode, etc.): 2  Moving from lying on back to sitting on the side of the bed?: 2  Moving to and from a bed to a chair (including a wheelchair)?: 2  Need to walk in hospital room?: 2  Climbing 3-5 steps with a railing?: 1  Basic Mobility Total Score: 11       Therapeutic Activities and Exercises:  · Attempt to perform AROM; pt with no evidence of learning with repeated cues with extended time along with demo visually and with facilitation      Patient education  · Patient educated on the role of PT and POC  · Patient educated on importance  activity while in the hosptial per tolerance for improved endurance and to limit deconditioning   · Patient  educated on safe transfers with nursing as appropriate  · Patient educated on energy conservation, pursed lip breathing  · Patient educated on proper transfer mechanics and safety  · All of patients questions were answered within the scope of PT        Patient left HOB elevated with all lines intact and call button in reach..    GOALS:   Multidisciplinary Problems     Physical Therapy Goals        Problem: Physical Therapy Goal    Goal Priority Disciplines Outcome Goal Variances Interventions   Physical Therapy Goal     PT, PT/OT Ongoing (interventions implemented as appropriate)     Description:  Goals to be met by: 2019      Patient will increase functional independence with mobility by performin. Supine to sit with Stand-by Assistance  2. Sit to supine with Stand-by Assistance  3. Sit to stand transfer with Minimum assistance   4. Bed to chair transfer with Minimal Assistance using LRAD or no AD   5. Gait  x 10 feet with Minimal Assistance using LRAD or no AD.                       Time Tracking:     PT Received On: 19  PT Start Time: 1444     PT Stop Time: 1505  PT Total Time (min): 21 min     Billable Minutes: Therapeutic Activity 10 min unbilled gait train x 8 min      Treatment Type: Treatment  PT/PTA: PT     PTA Visit Number: 0     Bryan Kendrick, PT  2019

## 2019-01-02 NOTE — PT/OT/SLP PROGRESS
Occupational Therapy   Treatment    Name: Cesilia Delgado  MRN: [id number]  Admitting Diagnosis:  Hypernatremia       Recommendations:     Discharge Recommendations: nursing facility, skilled  Discharge Equipment Recommendations:  hospital bed, lift device, wheelchair, manual  Barriers to discharge:  None    Subjective     Pain/Comfort:  · Pain Rating 1: 0/10  · Pain Rating Post-Intervention 1: 0/10    Objective:     Communicated with: RN prior to session.  Patient found with all lines intact and call button in reach and telemetry upon OT entry to room.    General Precautions: Standard, aspiration, fall   Orthopedic Precautions:N/A   Braces: N/A     Occupational Performance:    Bed Mobility:    · Patient completed Rolling/Turning to Left with  moderate assistance  · Patient completed Rolling/Turning to Right with moderate assistance  · Patient completed Scooting/Bridging with maximal assistance  · Patient completed Supine to Sit with moderate assistance  · Patient completed Sit to Supine with moderate assistance     Functional Mobility/Transfers:  · Pt not able to WB through BU/LE to assist w/ transfer  · Functional Mobility: sat EOB for 15 mins      Activities of Daily Living:  · Unwilling to participate.       [facility] 6 Click ADL: 6    Treatment & Education:  -Pt edu on OT role/POC  -Importance of OOB activity with staff assistance  -Safety during functional t/f and mobility  - White board updated  - Multiple self care tasks/functional mobility completed-- assistance level noted above  - All questions/concerns answered within OT scope of practice    Pt explained that her heart hurt from her son having passed away.     Patient left HOB elevated with all lines intact, call button in reach and RN notified  Education:    Assessment:     Cesilia Delgado is a 88 y.o. female with a medical diagnosis of Hypernatremia.  She presents with the following performance deficits affecting function are weakness, impaired endurance, impaired  self care skills, impaired functional mobilty, decreased lower extremity function, decreased upper extremity function, decreased ROM, impaired cognition, impaired coordination, impaired fine motor, impaired cardiopulmonary response to activity.     Rehab Prognosis:  Poor; patient would benefit from acute skilled OT services to address these deficits and reach maximum level of function.       Plan:     Patient to be seen 3 x/week to address the above listed problems via self-care/home management, therapeutic activities, therapeutic exercises  · Plan of Care Expires: 01/25/19  · Plan of Care Reviewed with: patient    This Plan of care has been discussed with the patient who was involved in its development and understands and is in agreement with the identified goals and treatment plan    GOALS:   Multidisciplinary Problems     Occupational Therapy Goals        Problem: Occupational Therapy Goal    Goal Priority Disciplines Outcome Interventions   Occupational Therapy Goal     OT, PT/OT Ongoing (interventions implemented as appropriate)    Description:  Goals to be met by: 1/15     Patient will increase functional independence with ADLs by performing:    UE Dressing with Set-up Assistance.  LE Dressing with Contact Guard Assistance.  Grooming while seated with Set-up Assistance.  Toileting from toilet with Contact Guard Assistance for hygiene and clothing management.                       Time Tracking:     OT Date of Treatment: 01/02/19  OT Start Time: 1355  OT Stop Time: 1415  OT Total Time (min): 20 min    Billable Minutes:Self Care/Home Management 20 mins    Jovan Montes, OT  1/2/2019

## 2019-01-02 NOTE — TELEPHONE ENCOUNTER
Called and spoke with the patient daughter n law and she advised patient is in the Hospital since 12/22/2018 she advised she was not eating and it was getting very difficult. She advised she will get d/c to [facility]. She will call if there is anything else we can do to assist. Termed the call

## 2019-01-02 NOTE — PT/OT/SLP PROGRESS
Speech Language Pathology      Cesilia Delgado  MRN: [id number]    Patient not seen today secondary to decreased level of alertness, not appropriate for PO trials in AM. SLP unable to re-attempt in PM. ST will f/u per POC.     Shaun Reyna CF-SLP  Speech-Language Pathology  Pager: [phone]

## 2019-01-02 NOTE — ASSESSMENT & PLAN NOTE
Palliative Care Encounter / Goals of care discussion:     Narrative:   Cesilia Delgado is a 88 y.o. female patient with a recent decline in functional capacity, significant weight loss and poor oral intake with underlying dementia who presents with profound dehydration.     1: Pain / Physical symptom Assessment:   - pain assesment: denies   - dyspnea assessment: denies   - anxiety assessment: denies   - depression assessment: denies    2: Social Background;   - Lives with POA daughter in law. Son (POA )  recently suddenly of a heart attack   - pt. Siblings and  are .    - was performing her ADL several months ago, now is essentially bed bound    3: Palliative care meeting participants:    Patient only. No family at the bedside       4: Goals of care Discussion details:   - Had discussion regarding wishes with the POA on Monday   - She was pursuing PEG despite concerns   - reached out today but no answer   - I had advised against PEG and suggested considering pleasure feeding and focus on comfort care at this point.   - POA not amendable to this idea and wants her to get stronger and better with the help of artificial nutrition.    - remains full code for now   - will follow to provide support as needed.     5: Goals of care Decisions / Recommendations / Plan:   - no family available for further discussion   - will follow up with POA (not available by phone)    6: Follow up plans:    Will follow    Thank you for your consult. I will follow along with you. Please call [phone] with questions.

## 2019-01-02 NOTE — PROGRESS NOTES
[facility]  Palliative Medicine  Progress Note    Patient Name: Cesilia Delgado  MRN: [id number]  Admission Date: 2018  Hospital Length of Stay: 10 days  Code Status: Full Code   Attending Provider: Simeon Kaye MD  Consulting Provider: Jerry August MD  Primary Care Physician: Jena Alvarez MD  Principal Problem:Hypernatremia    Patient information was obtained from past medical records.      Assessment/Plan:     Palliative care encounter    Palliative Care Encounter / Goals of care discussion:     Narrative:   Cesilia Delgado is a 88 y.o. female patient with a recent decline in functional capacity, significant weight loss and poor oral intake with underlying dementia who presents with profound dehydration.     1: Pain / Physical symptom Assessment:   - pain assesment: denies   - dyspnea assessment: denies   - anxiety assessment: denies   - depression assessment: denies    2: Social Background;   - Lives with POA daughter in law. Son (POA )  recently suddenly of a heart attack   - pt. Siblings and  are .    - was performing her ADL several months ago, now is essentially bed bound    3: Palliative care meeting participants:    Patient only. No family at the bedside       4: Goals of care Discussion details:   - Had discussion regarding wishes with the POA on Monday   - She was pursuing PEG despite concerns   - reached out today but no answer   - I had advised against PEG and suggested considering pleasure feeding and focus on comfort care at this point.   - POA not amendable to this idea and wants her to get stronger and better with the help of artificial nutrition.    - remains full code for now   - will follow to provide support as needed.     5: Goals of care Decisions / Recommendations / Plan:   - no family available for further discussion   - will follow up with POA (not available by phone)    6: Follow up plans:    Will follow    Thank you for your consult.  I will follow along with you. Please call [phone] with questions.            I will follow-up with patient. Please contact us if you have any additional questions.    Subjective:     Chief Complaint:   Chief Complaint   Patient presents with    Constipation     Pt family reports pt lack of appitie. Family states they fed her soup today which she was able to keep down but pt not wanting to chew any food or drink much water. Family states pts last bm unknown.      Altered Mental Status     Family reports two weeks ago pt was walking independently and was able to hold a conversation. Pt was oriented as of last week according to family. Pt currenlty disoriented x4.        HPI:   Cesilia Delgado is a 87 yo lady with a past history of dementia, CKD, HTN who presents with weight loss, fatigue and altered mental status over the past 2-3 weeks, maybe longer.   She has been living with her son and daughter in law for ~ 13 years now and had been quite functional until earlier this year when she started eating less and loosing weight.   Her son had a sudden cardiac event just a few months ago and was found dead in the bed by her daughter in law. Her other siblings and her  had  over the past years as well.     Upon evaluation the pt. Was found to have profound hypernatremia in the 170's. Her severe dehydration was corrected in the ICU, the pt. Was stepped down.     Discussion regarding PEG tube placement have been had and I am asked to discuss goals of care with her and POA.     I met with the pt. And Ms. York, her daughter in law at the bedside today.         Hospital Course:  No notes on file    Interval History: Pt. Non verbal, sleeping comfortably today. No family at the bedside    Past Medical History:   Diagnosis Date    Anemia     Asthma     CKD (chronic kidney disease), stage III     Dementia     Hypertension 2013    Pelvic mass in female     Pulmonary hypertension        Past Surgical  History:   Procedure Laterality Date    ESOPHAGOGASTRODUODENOSCOPY      ESOPHAGOGASTRODUODENOSCOPY (EGD) N/A 12/20/2016    Performed by Eitan Coles MD at [facility] ([address] [place] [address])    HIP SURGERY Right        Review of patient's allergies indicates:  No Known Allergies    Medications:  Continuous Infusions:  Scheduled Meds:   amLODIPine  10 mg Oral Daily    calcium-vitamin D3  1 tablet Per NG tube Once    folic acid  1 mg Oral Daily    heparin (porcine)  5,000 Units Subcutaneous Q8H    multivitamin  1 tablet Oral Daily    thiamine  100 mg Oral Daily    traZODone  50 mg Oral QHS    vitamin D  1,000 Units Oral Daily     PRN Meds:bisacodyl, sodium chloride 0.9%    Family History     Problem Relation (Age of Onset)    Cancer Sister    Colon polyps Son    Diabetes Sister, Son    Heart disease Sister    Hypertension Mother        Tobacco Use    Smoking status: Former Smoker     Start date: 7/1/2007    Smokeless tobacco: Never Used   Substance and Sexual Activity    Alcohol use: No     Alcohol/week: 0.0 oz    Drug use: No    Sexual activity: Yes     Partners: Male       Review of Systems  Objective:     Vital Signs (Most Recent):  Temp: 98.2 °F (36.8 °C) (01/02/19 0815)  Pulse: 71 (01/02/19 1109)  Resp: 12 (01/02/19 0815)  BP: (!) 110/53 (01/02/19 0815)  SpO2: 98 % (01/02/19 0815) Vital Signs (24h Range):  Temp:  [97.7 °F (36.5 °C)-99.1 °F (37.3 °C)] 98.2 °F (36.8 °C)  Pulse:  [63-80] 71  Resp:  [12-16] 12  SpO2:  [98 %] 98 %  BP: (108-160)/(53-61) 110/53     Weight: 34.5 kg (76 lb)  Body mass index is 13.9 kg/m².    Review of Symptoms    Unable to obtain due to AMS.     Symptom Assessment (ESAS 0-10 scale)   ESAS 0 1 2 3 4 5 6 7 8 9 10   Pain x             Dyspnea x             Anxiety x             Nausea x             Depression               Anorexia              Fatigue x             Insomnia              Restlessness               Agitation              CAM / Delirium _x_ --  ___+   Constipation      _x_ --  ___+   Diarrhea           _x_ --  ___+  Bowel Management Plan (BMP): No    Pain Assessment: denies    OME in 24 hours:     Performance Status: 40    ECOG Performance Status Grade: bedbound    Physical Exam   Constitutional: She appears well-developed.   Thin female at stated age   Neck: No JVD present.   Cardiovascular: Normal rate.   No murmur heard.  Pulmonary/Chest: Effort normal.   Abdominal: Soft. She exhibits no distension.       Significant Labs: All pertinent labs within the past 24 hours have been reviewed.  CBC:   Recent Labs   Lab 01/02/19  0657   WBC 5.85   HGB 8.3*   HCT 25.5*   MCV 75*        BMP:  Recent Labs   Lab 01/02/19  0657   GLU 61*      K 4.1      CO2 24   BUN 22   CREATININE 2.0*   CALCIUM 10.3   MG 1.6     LFT:  Lab Results   Component Value Date    AST 41 (H) 12/30/2018    ALKPHOS 56 12/30/2018    BILITOT 0.3 12/30/2018     Albumin:   Albumin   Date Value Ref Range Status   01/02/2019 2.4 (L) 3.5 - 5.2 g/dL Final     Protein:   Total Protein   Date Value Ref Range Status   12/30/2018 5.9 (L) 6.0 - 8.4 g/dL Final     Lactic acid:   Lab Results   Component Value Date    LACTATE 0.9 12/23/2018    LACTATE 1.2 12/23/2018       Significant Imaging: I have reviewed all pertinent imaging results/findings within the past 24 hours.    Advanced Directives::  Living Will: No  LaPOST: No  Do Not Resuscitate Status: No  Medical Power of : Yes. Agent's Name: Chela Landon    Decision-Making Capacity: Family answered questions    Living Arrangements: Lives with family    Psychosocial/Cultural:  Patient's most important priorities:  Unable to assess    Patient's biggest concerns/fears:  Unable to assess    Previous death/end of life care history:  Unable to assess    Patient's goals/hopes:  Unable to assess    Spiritual:     F- Ivy and Belief: Unable to assess    I - Importance: Unable to assess  .  C - Community: Unable to assess    A - Address in Care: Unable to  assess      > 50% of 45 min visit spent in chart review, face to face discussion of goals of care,  symptom assessment, coordination of care and emotional support.    Jerry August MD  Palliative Medicine  [facility]

## 2019-01-02 NOTE — MEDICAL/APP STUDENT
Hospital Medicine  Progress note    Team: [facility] B Derrek Borrero  Admit Date: 12/22/2018  IESHA 1/3/2019  Code status: Full Code    Principal Problem:  Hypernatremia    Interval hx: POA wants PEG. Palliative care saw patient and stated POA not available by phone.     ROS   Unable to perform ROS: encephalpathy  improving - AAOX 1 today  denies pain      PEx  Temp:  [97.5 °F (36.4 °C)-99.1 °F (37.3 °C)]   Pulse:  [63-80]   Resp:  [12-20]   BP: (108-160)/(56-66)   SpO2:  [98 %]   No intake or output data in the 24 hours ending 01/02/19 0818  Constitutional: No distress.   Severely frail & thin appearing elderly woman   HENT:   Head: Normocephalic and atraumatic.   Facial muscle waisting    Eyes: No scleral icterus.    Neck: Normal range of motion. Neck supple. No JVD present. No thyromegaly present. NGT insitu   Cardiovascular: Normal rate, regular rhythm and normal heart sounds.   Pulmonary/Chest: Effort normal and breath sounds normal. No stridor. No respiratory distress. She has no wheezes. She has no rales.   Abdominal: Soft. Bowel sounds are normal. She exhibits no distension. There is no tenderness. There is no guarding.   Musculoskeletal: She exhibits no edema, tenderness or deformity.   L. Elbow healing bruise    Neurological: She is alert.   encephalpathy  improving - AAOX 1 today  Skin: Skin is warm and dry. She is not diaphoretic.   Psychiatric: She has a normal mood and affect.     Recent Labs   Lab 12/30/18  0540 12/31/18  0811 01/01/19  0619   WBC 5.15 3.96 4.77   HGB 8.5* 8.7* 8.2*   HCT 25.4* 25.5* 24.9*   * 137* 158     Recent Labs   Lab 12/30/18  0540 12/30/18  1832 12/31/18  0732 12/31/18  0811 01/01/19  0619    139 140  --  141   K 3.7 4.5 3.8  --  3.9    110 109  --  110   CO2 25 23 26  --  26   BUN 28* 28* 23  --  23   CREATININE 1.7* 1.8* 1.6*  --  1.8*    78 77  --  73   CALCIUM 9.4 9.9 9.7  --  9.9   MG 1.8  --   --  1.7 1.8   PHOS 3.1  3.1  --  3.3 3.4 3.5  3.5      Recent Labs   Lab 12/29/18  1405  12/30/18  1832 12/31/18  0732 01/01/19  0619   ALKPHOS 59  --  56  --   --    ALT 29  --  35  --   --    AST 34  --  41*  --   --    ALBUMIN 2.3*   < > 2.3* 2.1* 2.1*   PROT 5.8*  --  5.9*  --   --    BILITOT 0.4  --  0.3  --   --     < > = values in this interval not displayed.      Recent Labs   Lab 12/31/18  1728 12/31/18  1730 01/01/19  0714 01/01/19  1140 01/01/19  1851 01/02/19  0724   POCTGLUCOSE 67* 112* 86 80 90 65*     No results for input(s): CPK, CPKMB, MB, TROPONINI in the last 72 hours.    Scheduled Meds:   amLODIPine  10 mg Oral Daily    calcium-vitamin D3  1 tablet Per NG tube Once    folic acid  1 mg Oral Daily    heparin (porcine)  5,000 Units Subcutaneous Q8H    multivitamin  1 tablet Oral Daily    thiamine  100 mg Oral Daily    traZODone  50 mg Oral QHS    vitamin D  1,000 Units Oral Daily     Continuous Infusions:  As Needed:  bisacodyl, sodium chloride 0.9%    Active Hospital Problems    Diagnosis  POA    Palliative care encounter [Z51.5]  Not Applicable    Goals of care, counseling/discussion [Z71.89]  Not Applicable    Insomnia [G47.00]  Yes    Dysphagia [R13.10]  Yes    Electrolyte disturbance [E87.8]  Yes    Caregiver burden [Z63.6]  Not Applicable    Mood disturbance [R45.86]  Yes    Malnutrition [E46]  Yes     Chronic    Essential hypertension [I10]  Yes     Chronic      Resolved Hospital Problems    Diagnosis Date Resolved POA    *Hypernatremia [E87.0] 12/28/2018 Yes    Acute encephalopathy [G93.40] 12/28/2018 No    Stage 4 chronic kidney disease [N18.4] 12/28/2018 Yes     Chronic    ODILIA (acute kidney injury) [N17.9] 12/28/2018 Yes       Overview      Assessment and Plan for Problems addressed today:    Hypernatremia  · Na 174 upon admission  · Serial BMPs q6hr  · Suspect 2/2 dehydration from advanced dementia  · Patient down to Na 141on d5w at 75cc/hr    Insomnia  · On trazodone    Dysphagia  · S/P SLP evaluation, likely  encephalopathy  · Started clear liquids  · NGT placed 12/23 and started trickle feeds  · NGT feeds advanced as tolerated  · Poor oral intake, advanced to puree diet    Mood disturbance  · R/O depression post son's demise    Acute encephalopathy   · Improving  · AAOx1 today  · Likely metabolic  · On thiamine, folate and vitamin D for malnutrition   · Dietician consulted  · CT head - no evidence of acute intracranial hemorrhage    Malnutrition  · Dietician consulted  · Low prealbumin    Acute on chronic kidney disease  · Improving with IV hydration    Essential hypertension  · Controlled on amlodipine      Discharge plan and follow up    Provider    I personally scribed for Simeon Kaye MD on 01/02/2019 at 2:19 PM. Electronically signed by sadia Borrero III on 01/02/2019 at 2:19 PM

## 2019-01-03 LAB
ALBUMIN SERPL BCP-MCNC: 2.1 G/DL
ANION GAP SERPL CALC-SCNC: 4 MMOL/L
BASOPHILS # BLD AUTO: 0.02 K/UL
BASOPHILS NFR BLD: 0.3 %
BUN SERPL-MCNC: 20 MG/DL
CALCIUM SERPL-MCNC: 9.9 MG/DL
CHLORIDE SERPL-SCNC: 111 MMOL/L
CO2 SERPL-SCNC: 28 MMOL/L
CREAT SERPL-MCNC: 2 MG/DL
DIFFERENTIAL METHOD: ABNORMAL
EOSINOPHIL # BLD AUTO: 0.1 K/UL
EOSINOPHIL NFR BLD: 1 %
ERYTHROCYTE [DISTWIDTH] IN BLOOD BY AUTOMATED COUNT: 18.8 %
EST. GFR  (AFRICAN AMERICAN): 25.1 ML/MIN/1.73 M^2
EST. GFR  (NON AFRICAN AMERICAN): 21.8 ML/MIN/1.73 M^2
GLUCOSE SERPL-MCNC: 87 MG/DL
HCT VFR BLD AUTO: 22 %
HGB BLD-MCNC: 7.2 G/DL
IMM GRANULOCYTES # BLD AUTO: 0.02 K/UL
IMM GRANULOCYTES NFR BLD AUTO: 0.3 %
LYMPHOCYTES # BLD AUTO: 1.1 K/UL
LYMPHOCYTES NFR BLD: 18.3 %
MAGNESIUM SERPL-MCNC: 1.6 MG/DL
MCH RBC QN AUTO: 25.3 PG
MCHC RBC AUTO-ENTMCNC: 32.7 G/DL
MCV RBC AUTO: 77 FL
MONOCYTES # BLD AUTO: 0.6 K/UL
MONOCYTES NFR BLD: 9.8 %
NEUTROPHILS # BLD AUTO: 4 K/UL
NEUTROPHILS NFR BLD: 70.3 %
NRBC BLD-RTO: 0 /100 WBC
PHOSPHATE SERPL-MCNC: 2.7 MG/DL
PHOSPHATE SERPL-MCNC: 2.7 MG/DL
PLATELET # BLD AUTO: 161 K/UL
PMV BLD AUTO: ABNORMAL FL
POCT GLUCOSE: 128 MG/DL (ref 70–110)
POCT GLUCOSE: 96 MG/DL (ref 70–110)
POTASSIUM SERPL-SCNC: 4 MMOL/L
RBC # BLD AUTO: 2.85 M/UL
SODIUM SERPL-SCNC: 143 MMOL/L
WBC # BLD AUTO: 5.73 K/UL

## 2019-01-03 PROCEDURE — 99232 PR SUBSEQUENT HOSPITAL CARE,LEVL II: ICD-10-PCS | Mod: ,,, | Performed by: INTERNAL MEDICINE

## 2019-01-03 PROCEDURE — 36415 COLL VENOUS BLD VENIPUNCTURE: CPT

## 2019-01-03 PROCEDURE — 25000003 PHARM REV CODE 250: Performed by: HOSPITALIST

## 2019-01-03 PROCEDURE — 25000003 PHARM REV CODE 250: Performed by: INTERNAL MEDICINE

## 2019-01-03 PROCEDURE — S5010 5% DEXTROSE AND 0.45% SALINE: HCPCS | Performed by: INTERNAL MEDICINE

## 2019-01-03 PROCEDURE — 83735 ASSAY OF MAGNESIUM: CPT

## 2019-01-03 PROCEDURE — 99232 SBSQ HOSP IP/OBS MODERATE 35: CPT | Mod: ,,, | Performed by: INTERNAL MEDICINE

## 2019-01-03 PROCEDURE — 25000003 PHARM REV CODE 250: Performed by: STUDENT IN AN ORGANIZED HEALTH CARE EDUCATION/TRAINING PROGRAM

## 2019-01-03 PROCEDURE — 92526 ORAL FUNCTION THERAPY: CPT

## 2019-01-03 PROCEDURE — 84100 ASSAY OF PHOSPHORUS: CPT

## 2019-01-03 PROCEDURE — 11000001 HC ACUTE MED/SURG PRIVATE ROOM

## 2019-01-03 PROCEDURE — 85025 COMPLETE CBC W/AUTO DIFF WBC: CPT

## 2019-01-03 PROCEDURE — 80069 RENAL FUNCTION PANEL: CPT

## 2019-01-03 PROCEDURE — 63600175 PHARM REV CODE 636 W HCPCS: Performed by: STUDENT IN AN ORGANIZED HEALTH CARE EDUCATION/TRAINING PROGRAM

## 2019-01-03 RX ADMIN — HEPARIN SODIUM 5000 UNITS: 5000 INJECTION, SOLUTION INTRAVENOUS; SUBCUTANEOUS at 06:01

## 2019-01-03 RX ADMIN — HEPARIN SODIUM 5000 UNITS: 5000 INJECTION, SOLUTION INTRAVENOUS; SUBCUTANEOUS at 03:01

## 2019-01-03 RX ADMIN — TRAZODONE HYDROCHLORIDE 50 MG: 50 TABLET ORAL at 09:01

## 2019-01-03 RX ADMIN — FOLIC ACID 1 MG: 1 TABLET ORAL at 09:01

## 2019-01-03 RX ADMIN — THERA TABS 1 TABLET: TAB at 09:01

## 2019-01-03 RX ADMIN — VITAMIN D, TAB 1000IU (100/BT) 1000 UNITS: 25 TAB at 09:01

## 2019-01-03 RX ADMIN — DEXTROSE AND SODIUM CHLORIDE: 5; .45 INJECTION, SOLUTION INTRAVENOUS at 06:01

## 2019-01-03 RX ADMIN — AMLODIPINE BESYLATE 10 MG: 10 TABLET ORAL at 09:01

## 2019-01-03 RX ADMIN — HEPARIN SODIUM 5000 UNITS: 5000 INJECTION, SOLUTION INTRAVENOUS; SUBCUTANEOUS at 09:01

## 2019-01-03 RX ADMIN — Medication 100 MG: at 09:01

## 2019-01-03 NOTE — PROGRESS NOTES
Hospital Medicine  Progress note    I personally performed the history, physical exam and medical decision making: and confirmed the accuracy of the information in the transcribed note.  Simeon Kaye M.D.  Pager: [phone]  Cell Phone: [phone]    Team: [facility] [place] [facility] [place] Simeon Kaye MD  Admit Date: 12/22/2018  IESHA 1/7/2019  Code status: Full Code    Principal Problem:  Hypernatremia    Interval hx:Spoke with daughter in law today, still desires PEG tube despite my recommendations against it, poor prognosis with signs of rapid decline.     ROS   Unable to perform ROS: encephalpathy  improving - AAOX 1 today  denies pain      PEx  Temp:  [97.5 °F (36.4 °C)-98.7 °F (37.1 °C)]   Pulse:  [61-75]   Resp:  [14-16]   BP: ()/(52-57)   SpO2:  [94 %-97 %]     Intake/Output Summary (Last 24 hours) at 1/3/2019 1344  Last data filed at 1/3/2019 0930  Gross per 24 hour   Intake 55 ml   Output --   Net 55 ml     Constitutional: No distress.   Severely frail & thin appearing elderly woman   HENT:   Head: Normocephalic and atraumatic.   Facial muscle waisting    Eyes: No scleral icterus.    Neck: Normal range of motion. Neck supple. No JVD present. No thyromegaly present. NGT insitu   Cardiovascular: Normal rate, regular rhythm and normal heart sounds.   Pulmonary/Chest: Effort normal and breath sounds normal. No stridor. No respiratory distress. She has no wheezes. She has no rales.   Abdominal: Soft. Bowel sounds are normal. She exhibits no distension. There is no tenderness. There is no guarding.   Musculoskeletal: She exhibits no edema, tenderness or deformity.   L. Elbow healing bruise    Neurological: She is alert.   encephalpathy  improving - AAOX 1 today  Skin: Skin is warm and dry. She is not diaphoretic.   Psychiatric: She has a normal mood and affect.     Recent Labs   Lab 01/01/19  0619 01/02/19  0657 01/03/19  0711   WBC 4.77 5.85 5.73   HGB 8.2* 8.3* 7.2*   HCT 24.9* 25.5* 22.0*    179 161     Recent  Labs   Lab 01/01/19  0619 01/02/19  0657 01/03/19  0711 01/03/19  0712    143 143  --    K 3.9 4.1 4.0  --     110 111*  --    CO2 26 24 28  --    BUN 23 22 20  --    CREATININE 1.8* 2.0* 2.0*  --    GLU 73 61* 87  --    CALCIUM 9.9 10.3 9.9  --    MG 1.8 1.6  --  1.6   PHOS 3.5  3.5 3.0  3.0 2.7 2.7     Recent Labs   Lab 12/29/18  1405  12/30/18  1832  01/01/19  0619 01/02/19  0657 01/03/19  0711   ALKPHOS 59  --  56  --   --   --   --    ALT 29  --  35  --   --   --   --    AST 34  --  41*  --   --   --   --    ALBUMIN 2.3*   < > 2.3*   < > 2.1* 2.4* 2.1*   PROT 5.8*  --  5.9*  --   --   --   --    BILITOT 0.4  --  0.3  --   --   --   --     < > = values in this interval not displayed.      Recent Labs   Lab 01/02/19  1229 01/02/19  1727 01/02/19  1855 01/02/19  2345 01/03/19  0615 01/03/19  1235   POCTGLUCOSE 75 63* 295* 85 96 128*     No results for input(s): CPK, CPKMB, MB, TROPONINI in the last 72 hours.    Scheduled Meds:   amLODIPine  10 mg Oral Daily    calcium-vitamin D3  1 tablet Per NG tube Once    folic acid  1 mg Oral Daily    heparin (porcine)  5,000 Units Subcutaneous Q8H    multivitamin  1 tablet Oral Daily    thiamine  100 mg Oral Daily    traZODone  50 mg Oral QHS    vitamin D  1,000 Units Oral Daily     Continuous Infusions:   dextrose 5 % and 0.45 % NaCl 50 mL/hr at 01/02/19 2044     As Needed:  bisacodyl, sodium chloride 0.9%    Active Hospital Problems    Diagnosis  POA    Palliative care encounter [Z51.5]  Not Applicable    Goals of care, counseling/discussion [Z71.89]  Not Applicable    Insomnia [G47.00]  Yes    Dysphagia [R13.10]  Yes    Electrolyte disturbance [E87.8]  Yes    Caregiver burden [Z63.6]  Not Applicable    Mood disturbance [R45.86]  Yes    Malnutrition [E46]  Yes     Chronic    Essential hypertension [I10]  Yes     Chronic      Resolved Hospital Problems    Diagnosis Date Resolved POA    *Hypernatremia [E87.0] 12/28/2018 Yes    Acute  encephalopathy [G93.40] 12/28/2018 No    Stage 4 chronic kidney disease [N18.4] 12/28/2018 Yes     Chronic    ODILIA (acute kidney injury) [N17.9] 12/28/2018 Yes       Overview  Ms. Delgado is an 87yo woman with PMH of CKD, HTN, pulmonary HTN, former smoker, dementia, and Fe deficiency anemia who is brought in by her daughter in law with complaints of 1-2 weeks onset of altered mental status.     Assessment and Plan for Problems addressed today:    Hypernatremia  · Na 174 upon admission  · Serial BMPs q6hr  · Suspect 2/2 dehydration from advanced dementia  · Patient down to Na 141on d5w at 75cc/hr    Insomnia  · On trazodone    Dysphagia  · S/P SLP evaluation, likely encephalopathy  · Started clear liquids  · NGT placed 12/23 and started trickle feeds  · NGT feeds advanced as tolerated  · Poor oral intake, advanced to puree diet  · Will consult GI for PEG tube placement    Mood disturbance  · R/O depression post son's demise    Acute encephalopathy   · Improving  · AAOx1 today  · Likely metabolic  · On thiamine, folate and vitamin D for malnutrition   · Dietician consulted  · CT head - no evidence of acute intracranial hemorrhage    Malnutrition  · Dietician consulted  · Low prealbumin    Acute on chronic kidney disease  · Improving with IV hydration    Essential hypertension  · Controlled on amlodipine      Discharge plan and follow up    Provider    I personally scribed for Simeon Kaye MD on 01/03/2019 at 1:10 PM. Electronically signed by sadia Borrero III on 01/03/2019 at 1:10 PM

## 2019-01-03 NOTE — MEDICAL/APP STUDENT
Hospital Medicine  Progress note    I personally performed the history, physical exam and medical decision making: and confirmed the accuracy of the information in the transcribed note.  Simeon Kaye M.D.  Pager: [phone]  Cell Phone: [phone]    Team: [facility] Simeon Kaye MD  Admit Date: 12/22/2018  IESHA 1/7/2019  Code status: Full Code    Principal Problem:  Hypernatremia    Interval hx:Spoke with daughter in law today, still desires PEG tube despite my recommendations against it, poor prognosis with signs of rapid decline.     ROS   Unable to perform ROS: encephalpathy  improving - AAOX 1 today  denies pain      PEx  Temp:  [97.5 °F (36.4 °C)-98.7 °F (37.1 °C)]   Pulse:  [61-75]   Resp:  [14-16]   BP: ()/(52-57)   SpO2:  [94 %-97 %]     Intake/Output Summary (Last 24 hours) at 1/3/2019 1342  Last data filed at 1/3/2019 0930  Gross per 24 hour   Intake 55 ml   Output --   Net 55 ml     Constitutional: No distress.   Severely frail & thin appearing elderly woman   HENT:   Head: Normocephalic and atraumatic.   Facial muscle waisting    Eyes: No scleral icterus.    Neck: Normal range of motion. Neck supple. No JVD present. No thyromegaly present. NGT insitu   Cardiovascular: Normal rate, regular rhythm and normal heart sounds.   Pulmonary/Chest: Effort normal and breath sounds normal. No stridor. No respiratory distress. She has no wheezes. She has no rales.   Abdominal: Soft. Bowel sounds are normal. She exhibits no distension. There is no tenderness. There is no guarding.   Musculoskeletal: She exhibits no edema, tenderness or deformity.   L. Elbow healing bruise    Neurological: She is alert.   encephalpathy  improving - AAOX 1 today  Skin: Skin is warm and dry. She is not diaphoretic.   Psychiatric: She has a normal mood and affect.     Recent Labs   Lab 01/01/19  0619 01/02/19  0657 01/03/19  0711   WBC 4.77 5.85 5.73   HGB 8.2* 8.3* 7.2*   HCT 24.9* 25.5* 22.0*    179 161     Recent  Labs   Lab 01/01/19  0619 01/02/19  0657 01/03/19  0711 01/03/19  0712    143 143  --    K 3.9 4.1 4.0  --     110 111*  --    CO2 26 24 28  --    BUN 23 22 20  --    CREATININE 1.8* 2.0* 2.0*  --    GLU 73 61* 87  --    CALCIUM 9.9 10.3 9.9  --    MG 1.8 1.6  --  1.6   PHOS 3.5  3.5 3.0  3.0 2.7 2.7     Recent Labs   Lab 12/29/18  1405  12/30/18  1832  01/01/19  0619 01/02/19  0657 01/03/19  0711   ALKPHOS 59  --  56  --   --   --   --    ALT 29  --  35  --   --   --   --    AST 34  --  41*  --   --   --   --    ALBUMIN 2.3*   < > 2.3*   < > 2.1* 2.4* 2.1*   PROT 5.8*  --  5.9*  --   --   --   --    BILITOT 0.4  --  0.3  --   --   --   --     < > = values in this interval not displayed.      Recent Labs   Lab 01/02/19  1229 01/02/19  1727 01/02/19  1855 01/02/19  2345 01/03/19  0615 01/03/19  1235   POCTGLUCOSE 75 63* 295* 85 96 128*     No results for input(s): CPK, CPKMB, MB, TROPONINI in the last 72 hours.    Scheduled Meds:   amLODIPine  10 mg Oral Daily    calcium-vitamin D3  1 tablet Per NG tube Once    folic acid  1 mg Oral Daily    heparin (porcine)  5,000 Units Subcutaneous Q8H    multivitamin  1 tablet Oral Daily    thiamine  100 mg Oral Daily    traZODone  50 mg Oral QHS    vitamin D  1,000 Units Oral Daily     Continuous Infusions:   dextrose 5 % and 0.45 % NaCl 50 mL/hr at 01/02/19 2044     As Needed:  bisacodyl, sodium chloride 0.9%    Active Hospital Problems    Diagnosis  POA    Palliative care encounter [Z51.5]  Not Applicable    Goals of care, counseling/discussion [Z71.89]  Not Applicable    Insomnia [G47.00]  Yes    Dysphagia [R13.10]  Yes    Electrolyte disturbance [E87.8]  Yes    Caregiver burden [Z63.6]  Not Applicable    Mood disturbance [R45.86]  Yes    Malnutrition [E46]  Yes     Chronic    Essential hypertension [I10]  Yes     Chronic      Resolved Hospital Problems    Diagnosis Date Resolved POA    *Hypernatremia [E87.0] 12/28/2018 Yes    Acute  encephalopathy [G93.40] 12/28/2018 No    Stage 4 chronic kidney disease [N18.4] 12/28/2018 Yes     Chronic    ODILIA (acute kidney injury) [N17.9] 12/28/2018 Yes       Overview  Ms. Delgado is an 87yo woman with PMH of CKD, HTN, pulmonary HTN, former smoker, dementia, and Fe deficiency anemia who is brought in by her daughter in law with complaints of 1-2 weeks onset of altered mental status.     Assessment and Plan for Problems addressed today:    Hypernatremia  · Na 174 upon admission  · Serial BMPs q6hr  · Suspect 2/2 dehydration from advanced dementia  · Patient down to Na 141on d5w at 75cc/hr    Insomnia  · On trazodone    Dysphagia  · S/P SLP evaluation, likely encephalopathy  · Started clear liquids  · NGT placed 12/23 and started trickle feeds  · NGT feeds advanced as tolerated  · Poor oral intake, advanced to puree diet  · Will consult GI for PEG tube placement    Mood disturbance  · R/O depression post son's demise    Acute encephalopathy   · Improving  · AAOx1 today  · Likely metabolic  · On thiamine, folate and vitamin D for malnutrition   · Dietician consulted  · CT head - no evidence of acute intracranial hemorrhage    Malnutrition  · Dietician consulted  · Low prealbumin    Acute on chronic kidney disease  · Improving with IV hydration    Essential hypertension  · Controlled on amlodipine      Discharge plan and follow up    Provider    I personally scribed for Simeon Kaye MD on 01/03/2019 at 1:10 PM. Electronically signed by sadia Borrero III on 01/03/2019 at 1:10 PM

## 2019-01-03 NOTE — PLAN OF CARE
Problem: SLP Goal  Goal: SLP Goal  Goals expected to be met by 1/7/19  1. Pt will tolerate puree diet with no overt s/s of aspiration.  2. Pt will participate in advanced diet trials with no overt s/s of aspiration to determine safest/least restrictive PO diet          Patient seen for continued monitoring of diet tolerance. SLP recommending PUREE DIET/THIN LIQUIDS with STRICT 1:1 ASSISTANCE DURING MEALS to ensure safe PO intake.     Shaun Reyna CF-SLP  Speech-Language Pathology  Pager: [phone]

## 2019-01-03 NOTE — PLAN OF CARE
Unable to contact daughter in law today. Overall poor prognosis with possible signs of rapid decline. Patient is full code.

## 2019-01-03 NOTE — MEDICAL/APP STUDENT
Hospital Medicine  Progress note    Team: [facility] B Derrek Borrero  Admit Date: 12/22/2018  IESHA 1/4/2019  Code status: Full Code    Principal Problem:  Hypernatremia    Interval hx: Unable to contact daughter in law yesterday. Patient poor prognosis with signs of rapid decline.     ROS   Unable to perform ROS: encephalpathy  improving - AAOX 1 today  denies pain      PEx  Temp:  [97.5 °F (36.4 °C)-98.7 °F (37.1 °C)]   Pulse:  [61-79]   Resp:  [12-16]   BP: ()/(52-70)   SpO2:  [94 %-98 %]     Intake/Output Summary (Last 24 hours) at 1/3/2019 0812  Last data filed at 1/3/2019 0600  Gross per 24 hour   Intake 25 ml   Output --   Net 25 ml     Constitutional: No distress.   Severely frail & thin appearing elderly woman   HENT:   Head: Normocephalic and atraumatic.   Facial muscle waisting    Eyes: No scleral icterus.    Neck: Normal range of motion. Neck supple. No JVD present. No thyromegaly present. NGT insitu   Cardiovascular: Normal rate, regular rhythm and normal heart sounds.   Pulmonary/Chest: Effort normal and breath sounds normal. No stridor. No respiratory distress. She has no wheezes. She has no rales.   Abdominal: Soft. Bowel sounds are normal. She exhibits no distension. There is no tenderness. There is no guarding.   Musculoskeletal: She exhibits no edema, tenderness or deformity.   L. Elbow healing bruise    Neurological: She is alert.   encephalpathy  improving - AAOX 1 today  Skin: Skin is warm and dry. She is not diaphoretic.   Psychiatric: She has a normal mood and affect.     Recent Labs   Lab 12/31/18  0811 01/01/19  0619 01/02/19  0657   WBC 3.96 4.77 5.85   HGB 8.7* 8.2* 8.3*   HCT 25.5* 24.9* 25.5*   * 158 179     Recent Labs   Lab 12/31/18  0732 12/31/18  0811 01/01/19  0619 01/02/19  0657     --  141 143   K 3.8  --  3.9 4.1     --  110 110   CO2 26  --  26 24   BUN 23  --  23 22   CREATININE 1.6*  --  1.8* 2.0*   GLU 77  --  73 61*   CALCIUM 9.7  --  9.9 10.3   MG   --  1.7 1.8 1.6   PHOS 3.3 3.4 3.5  3.5 3.0  3.0     Recent Labs   Lab 12/29/18  1405  12/30/18  1832 12/31/18  0732 01/01/19  0619 01/02/19  0657   ALKPHOS 59  --  56  --   --   --    ALT 29  --  35  --   --   --    AST 34  --  41*  --   --   --    ALBUMIN 2.3*   < > 2.3* 2.1* 2.1* 2.4*   PROT 5.8*  --  5.9*  --   --   --    BILITOT 0.4  --  0.3  --   --   --     < > = values in this interval not displayed.      Recent Labs   Lab 01/02/19  0724 01/02/19  1229 01/02/19  1727 01/02/19  1855 01/02/19  2345 01/03/19  0615   POCTGLUCOSE 65* 75 63* 295* 85 96     No results for input(s): CPK, CPKMB, MB, TROPONINI in the last 72 hours.    Scheduled Meds:   amLODIPine  10 mg Oral Daily    calcium-vitamin D3  1 tablet Per NG tube Once    folic acid  1 mg Oral Daily    heparin (porcine)  5,000 Units Subcutaneous Q8H    multivitamin  1 tablet Oral Daily    thiamine  100 mg Oral Daily    traZODone  50 mg Oral QHS    vitamin D  1,000 Units Oral Daily     Continuous Infusions:   dextrose 5 % and 0.45 % NaCl 50 mL/hr at 01/02/19 2044     As Needed:  bisacodyl, sodium chloride 0.9%    Active Hospital Problems    Diagnosis  POA    Palliative care encounter [Z51.5]  Not Applicable    Goals of care, counseling/discussion [Z71.89]  Not Applicable    Insomnia [G47.00]  Yes    Dysphagia [R13.10]  Yes    Electrolyte disturbance [E87.8]  Yes    Caregiver burden [Z63.6]  Not Applicable    Mood disturbance [R45.86]  Yes    Malnutrition [E46]  Yes     Chronic    Essential hypertension [I10]  Yes     Chronic      Resolved Hospital Problems    Diagnosis Date Resolved POA    *Hypernatremia [E87.0] 12/28/2018 Yes    Acute encephalopathy [G93.40] 12/28/2018 No    Stage 4 chronic kidney disease [N18.4] 12/28/2018 Yes     Chronic    ODILIA (acute kidney injury) [N17.9] 12/28/2018 Yes       Overview  Ms. Delgado is an 87yo woman with PMH of CKD, HTN, pulmonary HTN, former smoker, dementia, and Fe deficiency anemia who is brought  in by her daughter in law with complaints of 1-2 weeks onset of altered mental status.     Assessment and Plan for Problems addressed today:    Hypernatremia  · Na 174 upon admission  · Serial BMPs q6hr  · Suspect 2/2 dehydration from advanced dementia  · Patient down to Na 141on d5w at 75cc/hr    Insomnia  · On trazodone    Dysphagia  · S/P SLP evaluation, likely encephalopathy  · Started clear liquids  · NGT placed 12/23 and started trickle feeds  · NGT feeds advanced as tolerated  · Poor oral intake, advanced to puree diet    Mood disturbance  · R/O depression post son's demise    Acute encephalopathy   · Improving  · AAOx1 today  · Likely metabolic  · On thiamine, folate and vitamin D for malnutrition   · Dietician consulted  · CT head - no evidence of acute intracranial hemorrhage    Malnutrition  · Dietician consulted  · Low prealbumin    Acute on chronic kidney disease  · Improving with IV hydration    Essential hypertension  · Controlled on amlodipine      Discharge plan and follow up    Provider    I personally scribed for Simeon Kaye MD on 01/03/2019 at 1:10 PM. Electronically signed by sadia Borrero III on 01/03/2019 at 1:10 PM

## 2019-01-03 NOTE — PLAN OF CARE
Problem: Adult Inpatient Plan of Care  Goal: Plan of Care Review  Outcome: Ongoing (interventions implemented as appropriate)  Pt turns and repositions with assist of staff x2. No new skin breakdown noted. Pt pain and safety monitored q 1-2 hrs this shift. Blood glucose monitored throughout shift. Dressing intact to bilateral heels and sacrum.  Bed locked and in lowest position. Rails elevated x 3. Brakes on. Call light and personal belongings in reach. Pt oriented to self only. Rounding to meet needs as pt unable to understand direction to use call light. Will continue to monitor.

## 2019-01-03 NOTE — PT/OT/SLP PROGRESS
Speech Language Pathology Treatment    Patient Name:  Cesilia Delgado   MRN:  [id number]  Admitting Diagnosis: Hypernatremia    Recommendations:                 General Recommendations:  Dysphagia therapy  Diet recommendations:  Puree, Liquid Diet Level: Thin   Aspiration Precautions: 1 bite/sip at a time, 1:1 Assistance with meals, Check for pocketing/oral residue, Feed only when awake/alert, Frequent oral care, HOB to 90 degrees, Meds crushed in puree, Monitor for s/s of aspiration, Remain upright 30 minutes post meal, Small bites/sips and Strict aspiration precautions   General Precautions: Standard, aspiration, fall, pudding thick  Communication strategies:  provide increased time to answer and go to room if call light pushed    Subjective     Patient awake but lethargic during session  Communicated with nurse prior to session    Pain/Comfort:  · Pain Rating 1: 0/10  · Pain Rating Post-Intervention 1: 0/10    Objective:     Has the patient been evaluated by SLP for swallowing?   Yes  Keep patient NPO? No   Current Respiratory Status: room air      Patient seen for continued swallow assessment and monitoring of diet tolerance. Patient sitting up in bed with nurse present administering meds during session. Patient required consistent verbal and tactile cueing from nurse and SLP during session to maintain alertness. Patient with limited vocalizations during session and gestural communication (head nod/shake) was ineffective. Patient tolerated trials of thin liquid x3 (via straw) and puree x4 (via tsp) with no overt signs of aspiration. However, patient exhibited significant oral holding with puree trials and required consistent cueing to initiate swallow. When patient's swallow was initiated she displayed good oral clearance. PO trials halted at this point 2' to patient's reduced level of alertness. Patient presents with a functional swallow when is awake and alert and given max prompting for swallowing. SLP educated  patient regarding her current diet recs, but she did not exhibit understanding. ST will continue to follow-up for diet tolerance. Patient left in room with call light within reach.    Assessment:     Cesilia Delgado is a 88 y.o. female with an SLP diagnosis of Dysphagia.     Goals:   Multidisciplinary Problems     SLP Goals        Problem: SLP Goal    Goal Priority Disciplines Outcome   SLP Goal     SLP Ongoing (interventions implemented as appropriate)   Description:  Goals expected to be met by 1/7/19  1. Pt will tolerate puree diet with no overt s/s of aspiration.  2. Pt will participate in advanced diet trials with no overt s/s of aspiration to determine safest/least restrictive PO diet                          Plan:     · Patient to be seen:  3 x/week   · Plan of Care expires:  01/23/19  · Plan of Care reviewed with:  patient   · SLP Follow-Up:  Yes       Discharge recommendations:  nursing facility, skilled   Barriers to Discharge:  Level of Skilled Assistance Needed     Time Tracking:     SLP Treatment Date:   01/03/19  Speech Start Time:  0929  Speech Stop Time:  0942     Speech Total Time (min):  13 min    Billable Minutes: Treatment Swallowing Dysfunction 13    Shaun Reyna CF-SLP  Speech-Language Pathology  Pager: [phone]   01/03/2019

## 2019-01-03 NOTE — PROGRESS NOTES
[facility]  Palliative Medicine  Progress Note    Patient Name: Cesilia Delgado  MRN: [id number]  Admission Date: 2018  Hospital Length of Stay: 11 days  Code Status: Full Code   Attending Provider: Simeon Kaye MD  Consulting Provider: Jerry August MD  Primary Care Physician: Jena Alvarez MD  Principal Problem:Hypernatremia    Patient information was obtained from past medical records.      Assessment/Plan:     Palliative care encounter    Palliative Care Encounter / Goals of care discussion:     Narrative:   Cesilia Delgado is a 88 y.o. female patient with a recent decline in functional capacity, significant weight loss and poor oral intake with underlying dementia who presents with profound dehydration.     1: Pain / Physical symptom Assessment:   - pain assesment: denies   - dyspnea assessment: denies   - anxiety assessment: denies   - depression assessment: denies    2: Social Background;   - Lives with POA daughter in law. Son (POA )  recently suddenly of a heart attack   - pt. Siblings and  are .    - was performing her ADL several months ago, now is essentially bed bound    3: Palliative care meeting participants:    Patient only. No family at the bedside       4: Goals of care Discussion details:   - Neither Dr. Kaye nor I have been able to contact the POA.    - no family present today    - pt. More alert and apparently tolerating bits of food.    - will need to discuss further course of action with POA once she is available   - it appears though that the pt. Will not be able to meet demands orally alone. Calorie count could help?    - Still feel that PEG feeding will not benefit this patient. Will need to discuss further with POA who had expressed wishes to place PEG in the past.      5: Goals of care Decisions / Recommendations / Plan:   - no family available for further discussion    6: Follow up plans:    Will follow and attempt to reach out to  POA.     Thank you for your consult. I will follow along with you. Please call [phone] with questions.            I will follow-up with patient. Please contact us if you have any additional questions.    Subjective:     Chief Complaint:   Chief Complaint   Patient presents with    Constipation     Pt family reports pt lack of appitie. Family states they fed her soup today which she was able to keep down but pt not wanting to chew any food or drink much water. Family states pts last bm unknown.      Altered Mental Status     Family reports two weeks ago pt was walking independently and was able to hold a conversation. Pt was oriented as of last week according to family. Pt currenlty disoriented x4.        HPI:   Cesilia Delgado is a 89 yo lady with a past history of dementia, CKD, HTN who presents with weight loss, fatigue and altered mental status over the past 2-3 weeks, maybe longer.   She has been living with her son and daughter in law for ~ 13 years now and had been quite functional until earlier this year when she started eating less and loosing weight.   Her son had a sudden cardiac event just a few months ago and was found dead in the bed by her daughter in law. Her other siblings and her  had  over the past years as well.     Upon evaluation the pt. Was found to have profound hypernatremia in the 170's. Her severe dehydration was corrected in the ICU, the pt. Was stepped down.     Discussion regarding PEG tube placement have been had and I am asked to discuss goals of care with her and POA.     I met with the pt. And Ms. York, her daughter in law at the bedside today.         Hospital Course:  No notes on file    Interval History: Pt awake today, she is non verbal but seems to be watching TV. No signs of discomfort or distress on my visit. No family present    Past Medical History:   Diagnosis Date    Anemia     Asthma     CKD (chronic kidney disease), stage III     Dementia      Hypertension 5/28/2013    Pelvic mass in female     Pulmonary hypertension        Past Surgical History:   Procedure Laterality Date    ESOPHAGOGASTRODUODENOSCOPY      ESOPHAGOGASTRODUODENOSCOPY (EGD) N/A 12/20/2016    Performed by Eitan Coles MD at [facility] ([address] [place] [address])    HIP SURGERY Right        Review of patient's allergies indicates:  No Known Allergies    Medications:  Continuous Infusions:   dextrose 5 % and 0.45 % NaCl 50 mL/hr at 01/02/19 2044     Scheduled Meds:   amLODIPine  10 mg Oral Daily    calcium-vitamin D3  1 tablet Per NG tube Once    folic acid  1 mg Oral Daily    heparin (porcine)  5,000 Units Subcutaneous Q8H    multivitamin  1 tablet Oral Daily    thiamine  100 mg Oral Daily    traZODone  50 mg Oral QHS    vitamin D  1,000 Units Oral Daily     PRN Meds:bisacodyl, sodium chloride 0.9%    Family History     Problem Relation (Age of Onset)    Cancer Sister    Colon polyps Son    Diabetes Sister, Son    Heart disease Sister    Hypertension Mother        Tobacco Use    Smoking status: Former Smoker     Start date: 7/1/2007    Smokeless tobacco: Never Used   Substance and Sexual Activity    Alcohol use: No     Alcohol/week: 0.0 oz    Drug use: No    Sexual activity: Yes     Partners: Male       Review of Systems   Unable to perform ROS: Dementia     Objective:     Vital Signs (Most Recent):  Temp: 98.7 °F (37.1 °C) (01/03/19 0733)  Pulse: 70 (01/03/19 1119)  Resp: 14 (01/03/19 0733)  BP: (!) 112/57 (01/03/19 0733)  SpO2: 95 % (01/03/19 0733) Vital Signs (24h Range):  Temp:  [97.5 °F (36.4 °C)-98.7 °F (37.1 °C)] 98.7 °F (37.1 °C)  Pulse:  [61-75] 70  Resp:  [14-16] 14  SpO2:  [94 %-97 %] 95 %  BP: ()/(52-57) 112/57     Weight: 34.5 kg (76 lb)  Body mass index is 13.9 kg/m².    Review of Symptoms    Signals no pain or distress. Non verbal and non communicative.     Symptom Assessment (ESAS 0-10 scale)   ESAS 0 1 2 3 4 5 6 7 8 9 10   Pain x             Dyspnea x              Anxiety x             Nausea x             Depression               Anorexia              Fatigue x             Insomnia              Restlessness               Agitation              CAM / Delirium _x_ --  ___+   Constipation     _x_ --  ___+   Diarrhea           _x_ --  ___+  Bowel Management Plan (BMP): No    Pain Assessment: denies    OME in 24 hours:     Performance Status: 40    ECOG Performance Status Grade: bedbound    Physical Exam   Constitutional: She appears well-developed.   Thin female at stated age   Neck: No JVD present.   Cardiovascular: Normal rate.   No murmur heard.  Pulmonary/Chest: Effort normal.   Abdominal: Soft. She exhibits no distension.       Significant Labs: All pertinent labs within the past 24 hours have been reviewed.  CBC:   Recent Labs   Lab 01/03/19  0711   WBC 5.73   HGB 7.2*   HCT 22.0*   MCV 77*        BMP:  Recent Labs   Lab 01/03/19  0711 01/03/19  0712   GLU 87  --      --    K 4.0  --    *  --    CO2 28  --    BUN 20  --    CREATININE 2.0*  --    CALCIUM 9.9  --    MG  --  1.6     LFT:  Lab Results   Component Value Date    AST 41 (H) 12/30/2018    ALKPHOS 56 12/30/2018    BILITOT 0.3 12/30/2018     Albumin:   Albumin   Date Value Ref Range Status   01/03/2019 2.1 (L) 3.5 - 5.2 g/dL Final     Protein:   Total Protein   Date Value Ref Range Status   12/30/2018 5.9 (L) 6.0 - 8.4 g/dL Final     Lactic acid:   Lab Results   Component Value Date    LACTATE 0.9 12/23/2018    LACTATE 1.2 12/23/2018       Significant Imaging: I have reviewed all pertinent imaging results/findings within the past 24 hours.    Advanced Directives::  Living Will: No  LaPOST: No  Do Not Resuscitate Status: No  Medical Power of : Yes. Agent's Name: Chela Barbi    Decision-Making Capacity: Family answered questions    Living Arrangements: Lives with family    Psychosocial/Cultural:  Patient's most important priorities:  Unable to assess    Patient's biggest  concerns/fears:  Unable to assess    Previous death/end of life care history:  Unable to assess    Patient's goals/hopes:  Unable to assess    Spiritual:     F- Ivy and Belief: Unable to assess    I - Importance: Unable to assess  .  C - Community: Unable to assess    A - Address in Care: Unable to assess      > 50% of 30 min visit spent in chart review, face to face discussion of goals of care,  symptom assessment, coordination of care and emotional support.    Jerry August MD  Palliative Medicine  [facility]

## 2019-01-03 NOTE — PLAN OF CARE
Problem: Adult Inpatient Plan of Care  Goal: Plan of Care Review  Outcome: Ongoing (interventions implemented as appropriate)  Fall precaution maintained this shift call bell in reach bed in low position no acute distress noted vs stable pt reposition every 2 hrs. Pt on low air mattress. No breakdown noted. Pt remained afebrile

## 2019-01-03 NOTE — SUBJECTIVE & OBJECTIVE
Interval History: Pt. Non verbal, sleeping comfortably today. No family at the bedside    Past Medical History:   Diagnosis Date    Anemia     Asthma     CKD (chronic kidney disease), stage III     Dementia     Hypertension 5/28/2013    Pelvic mass in female     Pulmonary hypertension        Past Surgical History:   Procedure Laterality Date    ESOPHAGOGASTRODUODENOSCOPY      ESOPHAGOGASTRODUODENOSCOPY (EGD) N/A 12/20/2016    Performed by Eitan Coles MD at [facility] ([address] [place] [address])    HIP SURGERY Right        Review of patient's allergies indicates:  No Known Allergies    Medications:  Continuous Infusions:   dextrose 5 % and 0.45 % NaCl 50 mL/hr at 01/02/19 2044     Scheduled Meds:   amLODIPine  10 mg Oral Daily    calcium-vitamin D3  1 tablet Per NG tube Once    folic acid  1 mg Oral Daily    heparin (porcine)  5,000 Units Subcutaneous Q8H    multivitamin  1 tablet Oral Daily    thiamine  100 mg Oral Daily    traZODone  50 mg Oral QHS    vitamin D  1,000 Units Oral Daily     PRN Meds:bisacodyl, sodium chloride 0.9%    Family History     Problem Relation (Age of Onset)    Cancer Sister    Colon polyps Son    Diabetes Sister, Son    Heart disease Sister    Hypertension Mother        Tobacco Use    Smoking status: Former Smoker     Start date: 7/1/2007    Smokeless tobacco: Never Used   Substance and Sexual Activity    Alcohol use: No     Alcohol/week: 0.0 oz    Drug use: No    Sexual activity: Yes     Partners: Male       Review of Systems   Unable to perform ROS: Dementia     Objective:     Vital Signs (Most Recent):  Temp: 98.7 °F (37.1 °C) (01/03/19 0733)  Pulse: 70 (01/03/19 1119)  Resp: 14 (01/03/19 0733)  BP: (!) 112/57 (01/03/19 0733)  SpO2: 95 % (01/03/19 0733) Vital Signs (24h Range):  Temp:  [97.5 °F (36.4 °C)-98.7 °F (37.1 °C)] 98.7 °F (37.1 °C)  Pulse:  [61-75] 70  Resp:  [14-16] 14  SpO2:  [94 %-97 %] 95 %  BP: ()/(52-57) 112/57     Weight: 34.5 kg (76 lb)  Body mass index  is 13.9 kg/m².    Review of Symptoms    Signals no pain or distress. Non verbal and non communicative.     Symptom Assessment (ESAS 0-10 scale)   ESAS 0 1 2 3 4 5 6 7 8 9 10   Pain x             Dyspnea x             Anxiety x             Nausea x             Depression               Anorexia              Fatigue x             Insomnia              Restlessness               Agitation              CAM / Delirium _x_ --  ___+   Constipation     _x_ --  ___+   Diarrhea           _x_ --  ___+  Bowel Management Plan (BMP): No    Pain Assessment: denies    OME in 24 hours:     Performance Status: 40    ECOG Performance Status Grade: bedbound    Physical Exam   Constitutional: She appears well-developed.   Thin female at stated age   Neck: No JVD present.   Cardiovascular: Normal rate.   No murmur heard.  Pulmonary/Chest: Effort normal.   Abdominal: Soft. She exhibits no distension.       Significant Labs: All pertinent labs within the past 24 hours have been reviewed.  CBC:   Recent Labs   Lab 01/03/19  0711   WBC 5.73   HGB 7.2*   HCT 22.0*   MCV 77*        BMP:  Recent Labs   Lab 01/03/19  0711 01/03/19  0712   GLU 87  --      --    K 4.0  --    *  --    CO2 28  --    BUN 20  --    CREATININE 2.0*  --    CALCIUM 9.9  --    MG  --  1.6     LFT:  Lab Results   Component Value Date    AST 41 (H) 12/30/2018    ALKPHOS 56 12/30/2018    BILITOT 0.3 12/30/2018     Albumin:   Albumin   Date Value Ref Range Status   01/03/2019 2.1 (L) 3.5 - 5.2 g/dL Final     Protein:   Total Protein   Date Value Ref Range Status   12/30/2018 5.9 (L) 6.0 - 8.4 g/dL Final     Lactic acid:   Lab Results   Component Value Date    LACTATE 0.9 12/23/2018    LACTATE 1.2 12/23/2018       Significant Imaging: I have reviewed all pertinent imaging results/findings within the past 24 hours.    Advanced Directives::  Living Will: No  LaPOST: No  Do Not Resuscitate Status: No  Medical Power of : Yes. Agent's Name: Chela  Page    Decision-Making Capacity: Family answered questions    Living Arrangements: Lives with family    Psychosocial/Cultural:  Patient's most important priorities:  Unable to assess    Patient's biggest concerns/fears:  Unable to assess    Previous death/end of life care history:  Unable to assess    Patient's goals/hopes:  Unable to assess    Spiritual:     F- Ivy and Belief: Unable to assess    I - Importance: Unable to assess  .  C - Community: Unable to assess    A - Address in Care: Unable to assess

## 2019-01-03 NOTE — ASSESSMENT & PLAN NOTE
Palliative Care Encounter / Goals of care discussion:     Narrative:   Cesilia Delgado is a 88 y.o. female patient with a recent decline in functional capacity, significant weight loss and poor oral intake with underlying dementia who presents with profound dehydration.     1: Pain / Physical symptom Assessment:   - pain assesment: denies   - dyspnea assessment: denies   - anxiety assessment: denies   - depression assessment: denies    2: Social Background;   - Lives with POA daughter in law. Son (POA )  recently suddenly of a heart attack   - pt. Siblings and  are .    - was performing her ADL several months ago, now is essentially bed bound    3: Palliative care meeting participants:    Patient only. No family at the bedside       4: Goals of care Discussion details:   - Neither Dr. Kaye nor I have been able to contact the POA.    - no family present today    - pt. More alert and apparently tolerating bits of food.    - will need to discuss further course of action with POA once she is available   - it appears though that the pt. Will not be able to meet demands orally alone. Calorie count could help?    - Still feel that PEG feeding will not benefit this patient. Will need to discuss further with POA who had expressed wishes to place PEG in the past.      5: Goals of care Decisions / Recommendations / Plan:   - no family available for further discussion    6: Follow up plans:    Will follow and attempt to reach out to POA.     Thank you for your consult. I will follow along with you. Please call [phone] with questions.

## 2019-01-04 LAB
ALBUMIN SERPL BCP-MCNC: 2.2 G/DL
ANION GAP SERPL CALC-SCNC: 6 MMOL/L
BASOPHILS # BLD AUTO: 0.01 K/UL
BASOPHILS NFR BLD: 0.1 %
BUN SERPL-MCNC: 16 MG/DL
CALCIUM SERPL-MCNC: 10.1 MG/DL
CHLORIDE SERPL-SCNC: 108 MMOL/L
CO2 SERPL-SCNC: 26 MMOL/L
CREAT SERPL-MCNC: 1.9 MG/DL
DIFFERENTIAL METHOD: ABNORMAL
EOSINOPHIL # BLD AUTO: 0.1 K/UL
EOSINOPHIL NFR BLD: 1 %
ERYTHROCYTE [DISTWIDTH] IN BLOOD BY AUTOMATED COUNT: 18.6 %
EST. GFR  (AFRICAN AMERICAN): 26.8 ML/MIN/1.73 M^2
EST. GFR  (NON AFRICAN AMERICAN): 23.2 ML/MIN/1.73 M^2
GLUCOSE SERPL-MCNC: 99 MG/DL
HCT VFR BLD AUTO: 25 %
HGB BLD-MCNC: 8.2 G/DL
IMM GRANULOCYTES # BLD AUTO: 0.03 K/UL
IMM GRANULOCYTES NFR BLD AUTO: 0.3 %
LYMPHOCYTES # BLD AUTO: 1.2 K/UL
LYMPHOCYTES NFR BLD: 12.4 %
MAGNESIUM SERPL-MCNC: 1.5 MG/DL
MCH RBC QN AUTO: 25.2 PG
MCHC RBC AUTO-ENTMCNC: 32.8 G/DL
MCV RBC AUTO: 77 FL
MONOCYTES # BLD AUTO: 0.7 K/UL
MONOCYTES NFR BLD: 6.7 %
NEUTROPHILS # BLD AUTO: 7.9 K/UL
NEUTROPHILS NFR BLD: 79.5 %
NRBC BLD-RTO: 0 /100 WBC
PHOSPHATE SERPL-MCNC: 2.7 MG/DL
PHOSPHATE SERPL-MCNC: 2.7 MG/DL
PLATELET # BLD AUTO: 162 K/UL
PMV BLD AUTO: 11.4 FL
POCT GLUCOSE: 108 MG/DL (ref 70–110)
POCT GLUCOSE: 116 MG/DL (ref 70–110)
POCT GLUCOSE: 122 MG/DL (ref 70–110)
POCT GLUCOSE: 98 MG/DL (ref 70–110)
POCT GLUCOSE: 99 MG/DL (ref 70–110)
POTASSIUM SERPL-SCNC: 3.9 MMOL/L
RBC # BLD AUTO: 3.25 M/UL
SODIUM SERPL-SCNC: 140 MMOL/L
WBC # BLD AUTO: 9.9 K/UL

## 2019-01-04 PROCEDURE — 25000003 PHARM REV CODE 250: Performed by: STUDENT IN AN ORGANIZED HEALTH CARE EDUCATION/TRAINING PROGRAM

## 2019-01-04 PROCEDURE — 36415 COLL VENOUS BLD VENIPUNCTURE: CPT

## 2019-01-04 PROCEDURE — 11000001 HC ACUTE MED/SURG PRIVATE ROOM

## 2019-01-04 PROCEDURE — 92526 ORAL FUNCTION THERAPY: CPT

## 2019-01-04 PROCEDURE — 63600175 PHARM REV CODE 636 W HCPCS: Performed by: STUDENT IN AN ORGANIZED HEALTH CARE EDUCATION/TRAINING PROGRAM

## 2019-01-04 PROCEDURE — 83735 ASSAY OF MAGNESIUM: CPT

## 2019-01-04 PROCEDURE — 85025 COMPLETE CBC W/AUTO DIFF WBC: CPT

## 2019-01-04 PROCEDURE — 80069 RENAL FUNCTION PANEL: CPT

## 2019-01-04 PROCEDURE — 25000003 PHARM REV CODE 250: Performed by: HOSPITALIST

## 2019-01-04 PROCEDURE — 25000003 PHARM REV CODE 250: Performed by: INTERNAL MEDICINE

## 2019-01-04 PROCEDURE — S5010 5% DEXTROSE AND 0.45% SALINE: HCPCS | Performed by: INTERNAL MEDICINE

## 2019-01-04 PROCEDURE — 99232 PR SUBSEQUENT HOSPITAL CARE,LEVL II: ICD-10-PCS | Mod: ,,, | Performed by: INTERNAL MEDICINE

## 2019-01-04 PROCEDURE — 99232 SBSQ HOSP IP/OBS MODERATE 35: CPT | Mod: ,,, | Performed by: INTERNAL MEDICINE

## 2019-01-04 PROCEDURE — 84100 ASSAY OF PHOSPHORUS: CPT

## 2019-01-04 RX ADMIN — AMLODIPINE BESYLATE 10 MG: 10 TABLET ORAL at 08:01

## 2019-01-04 RX ADMIN — HEPARIN SODIUM 5000 UNITS: 5000 INJECTION, SOLUTION INTRAVENOUS; SUBCUTANEOUS at 06:01

## 2019-01-04 RX ADMIN — HEPARIN SODIUM 5000 UNITS: 5000 INJECTION, SOLUTION INTRAVENOUS; SUBCUTANEOUS at 03:01

## 2019-01-04 RX ADMIN — TRAZODONE HYDROCHLORIDE 50 MG: 50 TABLET ORAL at 09:01

## 2019-01-04 RX ADMIN — HEPARIN SODIUM 5000 UNITS: 5000 INJECTION, SOLUTION INTRAVENOUS; SUBCUTANEOUS at 09:01

## 2019-01-04 RX ADMIN — DEXTROSE AND SODIUM CHLORIDE: 5; .45 INJECTION, SOLUTION INTRAVENOUS at 03:01

## 2019-01-04 RX ADMIN — VITAMIN D, TAB 1000IU (100/BT) 1000 UNITS: 25 TAB at 08:01

## 2019-01-04 RX ADMIN — FOLIC ACID 1 MG: 1 TABLET ORAL at 08:01

## 2019-01-04 RX ADMIN — THERA TABS 1 TABLET: TAB at 08:01

## 2019-01-04 RX ADMIN — Medication 100 MG: at 08:01

## 2019-01-04 NOTE — SIGNIFICANT EVENT
Notified by Artificial Intelligence early warning system of deterioration risk.  Chart, labs, VS reviewed.  Patient stable and responding appropriately to therapy.  Will continue to monitor.

## 2019-01-04 NOTE — CONSULTS
[facility]  Gastroenterology  Consult Note    Patient Name: Cesilia Delgado  MRN: [id number]  Admission Date: 12/22/2018  Hospital Length of Stay: 12 days  Code Status: Full Code   Attending Provider: Simeon Kaye MD   Consulting Provider: Armand Ramirez MD  Primary Care Physician: Jena Alvarez MD  Principal Problem:Hypernatremia    Inpatient consult to Gastroenterology  Consult performed by: Armand Ramirez MD  Consult ordered by: Simeon Kaye MD        Subjective:     HPI:  Ms. Delgado is an 87yo woman with PMH of CKD, HTN, pulmonary HTN, former smoker, dementia, and Fe deficiency anemia who is brought in by her daughter in law with complaints of 1-2 weeks onset of altered mental status. Admitted with dehydration and hypernatremia, which have improved. Patient is severely demented, and we have been consulted for PEG tube evaluation.  The patient is altered with little communication. She is cachectic and laying in bed. Does not appear in discomfort.        Past Medical History:   Diagnosis Date    Anemia     Asthma     CKD (chronic kidney disease), stage III     Dementia     Hypertension 5/28/2013    Pelvic mass in female     Pulmonary hypertension        Past Surgical History:   Procedure Laterality Date    ESOPHAGOGASTRODUODENOSCOPY      ESOPHAGOGASTRODUODENOSCOPY (EGD) N/A 12/20/2016    Performed by Eitan Coles MD at [facility] ([address] [place] [address])    HIP SURGERY Right        Review of patient's allergies indicates:  No Known Allergies  Family History     Problem Relation (Age of Onset)    Cancer Sister    Colon polyps Son    Diabetes Sister, Son    Heart disease Sister    Hypertension Mother        Tobacco Use    Smoking status: Former Smoker     Start date: 7/1/2007    Smokeless tobacco: Never Used   Substance and Sexual Activity    Alcohol use: No     Alcohol/week: 0.0 oz    Drug use: No    Sexual activity: Yes     Partners: Male     Review of Systems   Unable to perform ROS:  Dementia     Objective:     Vital Signs (Most Recent):  Temp: 98.2 °F (36.8 °C) (01/04/19 1701)  Pulse: 69 (01/04/19 1701)  Resp: 16 (01/04/19 1701)  BP: 113/82 (01/04/19 1701)  SpO2: 95 % (01/04/19 1701) Vital Signs (24h Range):  Temp:  [94.4 °F (34.7 °C)-98.2 °F (36.8 °C)] 98.2 °F (36.8 °C)  Pulse:  [60-71] 69  Resp:  [16-20] 16  SpO2:  [95 %-97 %] 95 %  BP: (113-187)/(62-82) 113/82     Weight: 34.5 kg (76 lb) (12/31/18 0757)  Body mass index is 13.9 kg/m².      Intake/Output Summary (Last 24 hours) at 1/4/2019 1751  Last data filed at 1/4/2019 0500  Gross per 24 hour   Intake 600 ml   Output --   Net 600 ml       Lines/Drains/Airways     Drain            Female External Urinary Catheter 12/27/18 2300 7 days          Peripheral Intravenous Line                 Midline Catheter Insertion/Assessment  - Single Lumen 12/24/18 1459 Right basilic vein (medial side of arm) 18g x 8cm 11 days                Physical Exam   Constitutional: She appears cachectic. No distress.   HENT:   Head: Normocephalic.   Eyes: Conjunctivae are normal. No scleral icterus.   Neck: Normal range of motion. Neck supple.   Cardiovascular: Normal rate and regular rhythm.   Pulmonary/Chest: Effort normal and breath sounds normal.   Abdominal: Bowel sounds are normal. She exhibits no distension and no mass. There is tenderness. There is guarding. There is no rebound.   Musculoskeletal: Normal range of motion.   Skin: Skin is warm and dry. There is pallor.       Significant Labs:  CBC:   Recent Labs   Lab 01/03/19  0711 01/04/19 0445   WBC 5.73 9.90   HGB 7.2* 8.2*   HCT 22.0* 25.0*    162     BMP:   Recent Labs   Lab 01/04/19  0445   GLU 99      K 3.9      CO2 26   BUN 16   CREATININE 1.9*   CALCIUM 10.1   MG 1.5*     CMP:   Recent Labs   Lab 01/04/19  0445   GLU 99   CALCIUM 10.1   ALBUMIN 2.2*      K 3.9   CO2 26      BUN 16   CREATININE 1.9*     Coagulation: No results for input(s): PT, INR, APTT in the last 48  hours.  Lipase: No results for input(s): LIPASE in the last 48 hours.    Significant Imaging:  Imaging results within the past 24 hours have been reviewed.    Assessment/Plan:     Dysphagia    Patient is an 89 y/o female with past history of dementia, and currently debilitated, GI consulted for evaluation for PEG tube. SLT evaluated patient and recommended pureed diet with thin liquids with observation. After discussion with daughter in law (Her POA) she wants to proceed with PEG tube. We are concerned about abdominal exam today.     -Please obtain CT abdomen without contrast to rule out abdominal pathology and assess anatomy.  -If negative we will plan for PEG tube early next week. Will update team on timing         Thank you for your consult. I will follow-up with patient. Please contact us if you have any additional questions.    Ranjit Ramirez MD  Gastroenterology  [facility]

## 2019-01-04 NOTE — PROGRESS NOTES
Hospital Medicine  Progress note    I personally performed the history, physical exam and medical decision making: and confirmed the accuracy of the information in the transcribed note.  Simeon Kaye M.D.  Pager: [phone]  Cell Phone: [phone]      Team: [facility] Simeon Kaye MD  Admit Date: 12/22/2018  IESHA 1/9/2019  Code status: Full Code    Principal Problem:  Hypernatremia    Interval hx: GI consulted to put in PEG tube.     ROS   Unable to perform ROS: encephalpathy  improving - AAOX 1 today  denies pain      PEx  Temp:  [94.4 °F (34.7 °C)-98.2 °F (36.8 °C)]   Pulse:  [60-71]   Resp:  [16-20]   BP: (131-187)/(62-67)   SpO2:  [95 %-97 %]     Intake/Output Summary (Last 24 hours) at 1/4/2019 1437  Last data filed at 1/4/2019 0500  Gross per 24 hour   Intake 600 ml   Output --   Net 600 ml     Constitutional: No distress.   Severely frail & thin appearing elderly woman   HENT:   Head: Normocephalic and atraumatic.   Facial muscle waisting    Eyes: No scleral icterus.    Neck: Normal range of motion. Neck supple. No JVD present. No thyromegaly present. NGT insitu   Cardiovascular: Normal rate, regular rhythm and normal heart sounds.   Pulmonary/Chest: Effort normal and breath sounds normal. No stridor. No respiratory distress. She has no wheezes. She has no rales.   Abdominal: Soft. Bowel sounds are normal. She exhibits no distension. There is no tenderness. There is no guarding.   Musculoskeletal: She exhibits no edema, tenderness or deformity.   L. Elbow healing bruise    Neurological: She is alert.   encephalpathy  improving - AAOX 1 today  Skin: Skin is warm and dry. She is not diaphoretic.   Psychiatric: She has a normal mood and affect.     Recent Labs   Lab 01/02/19  0657 01/03/19  0711 01/04/19  0445   WBC 5.85 5.73 9.90   HGB 8.3* 7.2* 8.2*   HCT 25.5* 22.0* 25.0*    161 162     Recent Labs   Lab 01/02/19  0657 01/03/19  0711 01/03/19  0712 01/04/19  0445    143  --  140   K 4.1  4.0  --  3.9    111*  --  108   CO2 24 28  --  26   BUN 22 20  --  16   CREATININE 2.0* 2.0*  --  1.9*   GLU 61* 87  --  99   CALCIUM 10.3 9.9  --  10.1   MG 1.6  --  1.6 1.5*   PHOS 3.0  3.0 2.7 2.7 2.7  2.7     Recent Labs   Lab 12/29/18  1405  12/30/18  1832  01/02/19  0657 01/03/19  0711 01/04/19  0445   ALKPHOS 59  --  56  --   --   --   --    ALT 29  --  35  --   --   --   --    AST 34  --  41*  --   --   --   --    ALBUMIN 2.3*   < > 2.3*   < > 2.4* 2.1* 2.2*   PROT 5.8*  --  5.9*  --   --   --   --    BILITOT 0.4  --  0.3  --   --   --   --     < > = values in this interval not displayed.      Recent Labs   Lab 01/03/19  0615 01/03/19  1235 01/03/19  1842 01/04/19  0007 01/04/19  0619 01/04/19  1248   POCTGLUCOSE 96 128* 122* 99 98 108     No results for input(s): CPK, CPKMB, MB, TROPONINI in the last 72 hours.    Scheduled Meds:   amLODIPine  10 mg Oral Daily    calcium-vitamin D3  1 tablet Per NG tube Once    folic acid  1 mg Oral Daily    heparin (porcine)  5,000 Units Subcutaneous Q8H    multivitamin  1 tablet Oral Daily    thiamine  100 mg Oral Daily    traZODone  50 mg Oral QHS    vitamin D  1,000 Units Oral Daily     Continuous Infusions:   dextrose 5 % and 0.45 % NaCl 50 mL/hr at 01/03/19 1843     As Needed:  bisacodyl, sodium chloride 0.9%    Active Hospital Problems    Diagnosis  POA    Palliative care encounter [Z51.5]  Not Applicable    Goals of care, counseling/discussion [Z71.89]  Not Applicable    Insomnia [G47.00]  Yes    Dysphagia [R13.10]  Yes    Electrolyte disturbance [E87.8]  Yes    Caregiver burden [Z63.6]  Not Applicable    Mood disturbance [R45.86]  Yes    Malnutrition [E46]  Yes     Chronic    Essential hypertension [I10]  Yes     Chronic      Resolved Hospital Problems    Diagnosis Date Resolved POA    *Hypernatremia [E87.0] 12/28/2018 Yes    Acute encephalopathy [G93.40] 12/28/2018 No    Stage 4 chronic kidney disease [N18.4] 12/28/2018 Yes     Chronic     ODILIA (acute kidney injury) [N17.9] 12/28/2018 Yes       Overview  Ms. Delgado is an 89yo woman with PMH of CKD, HTN, pulmonary HTN, former smoker, dementia, and Fe deficiency anemia who is brought in by her daughter in law with complaints of 1-2 weeks onset of altered mental status.     Assessment and Plan for Problems addressed today:    Hypernatremia  · Na 174 upon admission  · Serial BMPs q6hr  · Suspect 2/2 dehydration from advanced dementia  · Patient down to Na 141on d5w at 75cc/hr    Insomnia  · On trazodone    Dysphagia  · S/P SLP evaluation, likely encephalopathy  · Started clear liquids  · NGT placed 12/23 and started trickle feeds  · NGT feeds advanced as tolerated  · Poor oral intake, advanced to puree diet  · Will consult GI for PEG tube placement    Mood disturbance  · R/O depression post son's demise    Acute encephalopathy   · Improving  · AAOx1 today  · Likely metabolic  · On thiamine, folate and vitamin D for malnutrition   · Dietician consulted  · CT head - no evidence of acute intracranial hemorrhage    Malnutrition  · Dietician consulted  · Low prealbumin    Acute on chronic kidney disease  · Improving with IV hydration    Essential hypertension  · Controlled on amlodipine      Discharge plan and follow up    Provider    I personally scribed for Simeon Kaye MD on 01/04/2019 at 2:00 PM. Electronically signed by sadia Borrero III on 01/04/2019 at 2:00 PM

## 2019-01-04 NOTE — PLAN OF CARE
Problem: Adult Inpatient Plan of Care  Goal: Plan of Care Review  Outcome: Ongoing (interventions implemented as appropriate)   01/04/19 3331   Plan of Care Review   Plan of Care Reviewed With patient   Pt remains in stable condition, VSS. Orientedx1. Patient is improving, more alert today awake majority of the shift. Ate 75% for breakfast and 100% for dinner. No acute distress noted, repositioned every 2 hours. Will cont to monitor, call bell in reach, bed in lowest position. Safety maintained.

## 2019-01-04 NOTE — PLAN OF CARE
Problem: Adult Inpatient Plan of Care  Goal: Plan of Care Review  Outcome: Ongoing (interventions implemented as appropriate)   01/03/19 1802   Plan of Care Review   Plan of Care Reviewed With patient   Pt remains in stable condition, VSS. Orientedx1. No acute distress noted, repositioned every 2 hours. Will cont to monitor, call bell in reach, bed in lowest position. Safety maintained.

## 2019-01-04 NOTE — PT/OT/SLP PROGRESS
Speech Language Pathology Treatment    Patient Name:  Cesilia Delgado   MRN:  [id number]  Admitting Diagnosis: Hypernatremia    Recommendations:                 General Recommendations:  Dysphagia therapy  Diet recommendations:  Puree, Liquid Diet Level: Thin   Aspiration Precautions: 1 bite/sip at a time, 1:1 Assistance with meals, Check for pocketing/oral residue, Feed only when awake/alert, Frequent oral care, HOB to 90 degrees, Meds crushed in puree, Monitor for s/s of aspiration, Remain upright 30 minutes post meal, Small bites/sips and Strict aspiration precautions   General Precautions: Standard, aspiration, fall, pureed diet  Communication strategies:  provide increased time to answer and go to room if call light pushed    Subjective     Patient awake and alert during session  Patient more interactive and responsive than in prior sessions  Communicated with nurse prior to session    Pain/Comfort:  Pain Rating 1: 0/10  Pain Rating Post-Intervention 1: 0/10    Objective:     Has the patient been evaluated by SLP for swallowing?   Yes  Keep patient NPO? No   Current Respiratory Status: room air      Patient seen for continued swallow assessment and monitoring of diet tolerance. Nurse reported that patient had tolerated breakfast and meds without difficulty that morning. During session, patient was sitting up in bed. She was able to produce a volitional a weak cough and throat clear which was improvement over past sessions. She also produced several vocalizations during session, although they were slurred and could not be understood by SLP. She tolerated thin liquids x6 (via straw) and puree x4 (via tsp) with no overt signs of aspiration. She continued to exhibit oral holding across consistencies, but the delay was not as significant as it had been during prior sessions. Overall, patient's swallow profile was much improved from prior sessions. SLP educated patient regarding her current diet recs, but she did not  exhibit understanding. ST will continue to follow-up for diet tolerance. Patient left in room with call light within reach.    Assessment:     Cesilia Delgado is a 88 y.o. female with an SLP diagnosis of Dysphagia.     Goals:   Multidisciplinary Problems     SLP Goals        Problem: SLP Goal    Goal Priority Disciplines Outcome   SLP Goal     SLP Ongoing (interventions implemented as appropriate)   Description:  Goals expected to be met by 1/7/19  1. Pt will tolerate puree diet with no overt s/s of aspiration.  2. Pt will participate in advanced diet trials with no overt s/s of aspiration to determine safest/least restrictive PO diet                          Plan:     · Patient to be seen:  3 x/week   · Plan of Care expires:  01/23/19  · Plan of Care reviewed with:  patient   · SLP Follow-Up:  Yes       Discharge recommendations:  nursing facility, skilled   Barriers to Discharge:  Level of Skilled Assistance Needed     Time Tracking:     SLP Treatment Date:   01/04/19  Speech Start Time:  1006  Speech Stop Time:  1019     Speech Total Time (min):  13 min    Billable Minutes: Treatment Swallowing Dysfunction 13    PRABHJOT Barth-SLP  Speech-Language Pathology  Pager: [phone]   01/04/2019

## 2019-01-04 NOTE — PLAN OF CARE
Problem: Adult Inpatient Plan of Care  Goal: Plan of Care Review  Outcome: Ongoing (interventions implemented as appropriate)  Ms. Landon slept for the majority of the night. No change in condition. Turning q 2 hours. IV infusing well and voiding in her diaper. CBG wnl. Will continue to monitor.

## 2019-01-04 NOTE — SUBJECTIVE & OBJECTIVE
Past Medical History:   Diagnosis Date    Anemia     Asthma     CKD (chronic kidney disease), stage III     Dementia     Hypertension 5/28/2013    Pelvic mass in female     Pulmonary hypertension        Past Surgical History:   Procedure Laterality Date    ESOPHAGOGASTRODUODENOSCOPY      ESOPHAGOGASTRODUODENOSCOPY (EGD) N/A 12/20/2016    Performed by Eitan Coles MD at [facility] ([address] [place] [address])    HIP SURGERY Right        Review of patient's allergies indicates:  No Known Allergies  Family History     Problem Relation (Age of Onset)    Cancer Sister    Colon polyps Son    Diabetes Sister, Son    Heart disease Sister    Hypertension Mother        Tobacco Use    Smoking status: Former Smoker     Start date: 7/1/2007    Smokeless tobacco: Never Used   Substance and Sexual Activity    Alcohol use: No     Alcohol/week: 0.0 oz    Drug use: No    Sexual activity: Yes     Partners: Male     Review of Systems   Unable to perform ROS: Dementia     Objective:     Vital Signs (Most Recent):  Temp: 98.2 °F (36.8 °C) (01/04/19 1701)  Pulse: 69 (01/04/19 1701)  Resp: 16 (01/04/19 1701)  BP: 113/82 (01/04/19 1701)  SpO2: 95 % (01/04/19 1701) Vital Signs (24h Range):  Temp:  [94.4 °F (34.7 °C)-98.2 °F (36.8 °C)] 98.2 °F (36.8 °C)  Pulse:  [60-71] 69  Resp:  [16-20] 16  SpO2:  [95 %-97 %] 95 %  BP: (113-187)/(62-82) 113/82     Weight: 34.5 kg (76 lb) (12/31/18 0757)  Body mass index is 13.9 kg/m².      Intake/Output Summary (Last 24 hours) at 1/4/2019 1751  Last data filed at 1/4/2019 0500  Gross per 24 hour   Intake 600 ml   Output --   Net 600 ml       Lines/Drains/Airways     Drain            Female External Urinary Catheter 12/27/18 2300 7 days          Peripheral Intravenous Line                 Midline Catheter Insertion/Assessment  - Single Lumen 12/24/18 1459 Right basilic vein (medial side of arm) 18g x 8cm 11 days                Physical Exam   Constitutional: She appears cachectic. No distress.   HENT:    Head: Normocephalic.   Eyes: Conjunctivae are normal. No scleral icterus.   Neck: Normal range of motion. Neck supple.   Cardiovascular: Normal rate and regular rhythm.   Pulmonary/Chest: Effort normal and breath sounds normal.   Abdominal: Bowel sounds are normal. She exhibits no distension and no mass. There is tenderness. There is guarding. There is no rebound.   Musculoskeletal: Normal range of motion.   Skin: Skin is warm and dry. There is pallor.       Significant Labs:  CBC:   Recent Labs   Lab 01/03/19  0711 01/04/19  0445   WBC 5.73 9.90   HGB 7.2* 8.2*   HCT 22.0* 25.0*    162     BMP:   Recent Labs   Lab 01/04/19  0445   GLU 99      K 3.9      CO2 26   BUN 16   CREATININE 1.9*   CALCIUM 10.1   MG 1.5*     CMP:   Recent Labs   Lab 01/04/19  0445   GLU 99   CALCIUM 10.1   ALBUMIN 2.2*      K 3.9   CO2 26      BUN 16   CREATININE 1.9*     Coagulation: No results for input(s): PT, INR, APTT in the last 48 hours.  Lipase: No results for input(s): LIPASE in the last 48 hours.    Significant Imaging:  Imaging results within the past 24 hours have been reviewed.

## 2019-01-04 NOTE — MEDICAL/APP STUDENT
Hospital Medicine  Progress note      Team: [facility]OSP MED B Derrek Borrero  Admit Date: 12/22/2018  IESHA 1/7/2019  Code status: Full Code    Principal Problem:  Hypernatremia    Interval hx: GI consulted to put in PEG tube.     ROS   Unable to perform ROS: encephalpathy  improving - AAOX 1 today  denies pain      PEx  Temp:  [97.3 °F (36.3 °C)-98.2 °F (36.8 °C)]   Pulse:  [63-71]   Resp:  [16-20]   BP: (131-187)/(62-67)   SpO2:  [96 %-97 %]     Intake/Output Summary (Last 24 hours) at 1/4/2019 0825  Last data filed at 1/4/2019 0500  Gross per 24 hour   Intake 750 ml   Output --   Net 750 ml     Constitutional: No distress.   Severely frail & thin appearing elderly woman   HENT:   Head: Normocephalic and atraumatic.   Facial muscle waisting    Eyes: No scleral icterus.    Neck: Normal range of motion. Neck supple. No JVD present. No thyromegaly present. NGT insitu   Cardiovascular: Normal rate, regular rhythm and normal heart sounds.   Pulmonary/Chest: Effort normal and breath sounds normal. No stridor. No respiratory distress. She has no wheezes. She has no rales.   Abdominal: Soft. Bowel sounds are normal. She exhibits no distension. There is no tenderness. There is no guarding.   Musculoskeletal: She exhibits no edema, tenderness or deformity.   L. Elbow healing bruise    Neurological: She is alert.   encephalpathy  improving - AAOX 1 today  Skin: Skin is warm and dry. She is not diaphoretic.   Psychiatric: She has a normal mood and affect.     Recent Labs   Lab 01/02/19  0657 01/03/19  0711 01/04/19  0445   WBC 5.85 5.73 9.90   HGB 8.3* 7.2* 8.2*   HCT 25.5* 22.0* 25.0*    161 162     Recent Labs   Lab 01/02/19  0657 01/03/19  0711 01/03/19  0712 01/04/19  0445    143  --  140   K 4.1 4.0  --  3.9    111*  --  108   CO2 24 28  --  26   BUN 22 20  --  16   CREATININE 2.0* 2.0*  --  1.9*   GLU 61* 87  --  99   CALCIUM 10.3 9.9  --  10.1   MG 1.6  --  1.6 1.5*   PHOS 3.0  3.0 2.7 2.7 2.7  2.7      Recent Labs   Lab 12/29/18  1405  12/30/18  1832  01/02/19  0657 01/03/19  0711 01/04/19  0445   ALKPHOS 59  --  56  --   --   --   --    ALT 29  --  35  --   --   --   --    AST 34  --  41*  --   --   --   --    ALBUMIN 2.3*   < > 2.3*   < > 2.4* 2.1* 2.2*   PROT 5.8*  --  5.9*  --   --   --   --    BILITOT 0.4  --  0.3  --   --   --   --     < > = values in this interval not displayed.      Recent Labs   Lab 01/02/19  2345 01/03/19  0615 01/03/19  1235 01/03/19  1842 01/04/19  0007 01/04/19  0619   POCTGLUCOSE 85 96 128* 122* 99 98     No results for input(s): CPK, CPKMB, MB, TROPONINI in the last 72 hours.    Scheduled Meds:   amLODIPine  10 mg Oral Daily    calcium-vitamin D3  1 tablet Per NG tube Once    folic acid  1 mg Oral Daily    heparin (porcine)  5,000 Units Subcutaneous Q8H    multivitamin  1 tablet Oral Daily    thiamine  100 mg Oral Daily    traZODone  50 mg Oral QHS    vitamin D  1,000 Units Oral Daily     Continuous Infusions:   dextrose 5 % and 0.45 % NaCl 50 mL/hr at 01/03/19 1843     As Needed:  bisacodyl, sodium chloride 0.9%    Active Hospital Problems    Diagnosis  POA    Palliative care encounter [Z51.5]  Not Applicable    Goals of care, counseling/discussion [Z71.89]  Not Applicable    Insomnia [G47.00]  Yes    Dysphagia [R13.10]  Yes    Electrolyte disturbance [E87.8]  Yes    Caregiver burden [Z63.6]  Not Applicable    Mood disturbance [R45.86]  Yes    Malnutrition [E46]  Yes     Chronic    Essential hypertension [I10]  Yes     Chronic      Resolved Hospital Problems    Diagnosis Date Resolved POA    *Hypernatremia [E87.0] 12/28/2018 Yes    Acute encephalopathy [G93.40] 12/28/2018 No    Stage 4 chronic kidney disease [N18.4] 12/28/2018 Yes     Chronic    ODILIA (acute kidney injury) [N17.9] 12/28/2018 Yes       Overview  Ms. Delgado is an 89yo woman with PMH of CKD, HTN, pulmonary HTN, former smoker, dementia, and Fe deficiency anemia who is brought in by her daughter  in law with complaints of 1-2 weeks onset of altered mental status.     Assessment and Plan for Problems addressed today:    Hypernatremia  · Na 174 upon admission  · Serial BMPs q6hr  · Suspect 2/2 dehydration from advanced dementia  · Patient down to Na 141on d5w at 75cc/hr    Insomnia  · On trazodone    Dysphagia  · S/P SLP evaluation, likely encephalopathy  · Started clear liquids  · NGT placed 12/23 and started trickle feeds  · NGT feeds advanced as tolerated  · Poor oral intake, advanced to puree diet  · Will consult GI for PEG tube placement    Mood disturbance  · R/O depression post son's demise    Acute encephalopathy   · Improving  · AAOx1 today  · Likely metabolic  · On thiamine, folate and vitamin D for malnutrition   · Dietician consulted  · CT head - no evidence of acute intracranial hemorrhage    Malnutrition  · Dietician consulted  · Low prealbumin    Acute on chronic kidney disease  · Improving with IV hydration    Essential hypertension  · Controlled on amlodipine      Discharge plan and follow up    Provider    I personally scribed for Simeon Kaye MD on 01/04/2019 at 2:00 PM. Electronically signed by sadia Borrero III on 01/04/2019 at 2:00 PM

## 2019-01-04 NOTE — ASSESSMENT & PLAN NOTE
Patient is an 87 y/o female with past history of dementia, and currently debilitated, GI consulted for evaluation for PEG tube. SLT evaluated patient and recommended pureed diet with thin liquids with observation. After discussion with daughter in law (Her POA) she wants to proceed with PEG tube. We are concerned about abdominal exam today.     -Please obtain CT abdomen without contrast to rule out abdominal pathology and assess anatomy.  -If negative we will plan for PEG tube early next week. Will update team on timing

## 2019-01-04 NOTE — PLAN OF CARE
Problem: Adult Inpatient Plan of Care  Goal: Plan of Care Review    Recommendations     Recommendation/Intervention:   1. Continue current pureed diet per SLP recs with Boost Plus.      2. If PEG placed, resume previous order of Isosource at a goal rate of 35 ml/hr.   -Bolus recs: 3.5 cans Isosource daily to provide 1313 kcal, 60 gm pro, and 669 ml free water. An additional 700 ml free water will be needed daily for normal hydration.     3. RD following.     Goals: Meet % EEN, EPN  Nutrition Goal Status: goal not met

## 2019-01-04 NOTE — HPI
Ms. Delgado is an 87yo woman with PMH of CKD, HTN, pulmonary HTN, former smoker, dementia, and Fe deficiency anemia who is brought in by her daughter in law with complaints of 1-2 weeks onset of altered mental status. Admitted with dehydration and hypernatremia, which have improved. Patient is severely demented, and we have been consulted for PEG tube evaluation.  The patient is altered with little communication. She is cachectic and laying in bed. Does not appear in discomfort.

## 2019-01-04 NOTE — PROGRESS NOTES
"[facility]  Adult Nutrition  Progress Note    SUMMARY       Recommendations    Recommendation/Intervention:   1. Continue current pureed diet per SLP recs with Boost Plus.     2. If PEG placed, resume previous order of Isosource at a goal rate of 35 ml/hr.   -Bolus recs: 3.5 cans Isosource daily to provide 1313 kcal, 60 gm pro, and 669 ml free water. An additional 700 ml free water will be needed daily for normal hydration.    3. RD following.    Goals: Meet % EEN, EPN  Nutrition Goal Status: goal not met  Communication of RD Recs: reviewed with RN    Reason for Assessment    Reason For Assessment: RD follow-up  Diagnosis: other (see comments)(Hypernatremia)  Relevant Medical History: CKD, HTN, Dementia    General Information Comments: GI consult for PEG tube placement per family request. Remains on pureed diet per SLP recs with Boost ordered. Eating <25% of meals at this time. Receiving IVF.    Nutrition Discharge Planning: Unable to determine    Nutrition Risk Screen    Nutrition Risk Screen: dysphagia or difficulty swallowing    Nutrition/Diet History    Patient Reported Diet/Restrictions/Preferences: other (see comments)(KRISTI)  Spiritual, Cultural Beliefs, Uatsdin Practices, Values that Affect Care: no  Factors Affecting Nutritional Intake: decreased appetite, impaired cognitive status/motor control, difficulty/impaired swallowing    Anthropometrics    Temp: (!) 94.4 °F (34.7 °C)  Height Method: Stated  Height: 5' 2" (157.5 cm)  Height (inches): 62 in  Weight Method: Bed Scale  Weight: 34.5 kg (76 lb)  Weight (lb): 76 lb  Ideal Body Weight (IBW), Female: 110 lb  % Ideal Body Weight, Female (lb): 69.09 lb  BMI (Calculated): 13.9  BMI Grade: less than 16 protein-energy malnutrition grade III  Usual Body Weight (UBW), kg: (76-83# x 2 years)       Lab/Procedures/Meds    Pertinent Labs Reviewed: reviewed  Pertinent Labs Comments: Crt 1.9, GFR 26.8, Alb 2.2  Pertinent Medications Reviewed: " reviewed  Pertinent Medications Comments: folic acid, MVI, thiamine, vit D, IVF    Estimated/Assessed Needs    Weight Used For Calorie Calculations: 34.7 kg (76 lb 8 oz)  Energy Calorie Requirements (kcal): 5274-3165 kcal/d  Energy Need Method: Kcal/kg, other (see comments)(35-40 kcal/kg)  Protein Requirements: 52 g/d (1.5 g/kg)  Weight Used For Protein Calculations: 34.7 kg (76 lb 8 oz)     Estimated Fluid Requirement Method: other (see comments)(Per MD or 1 mL/kcal)  RDA Method (mL): 1215       Nutrition Prescription Ordered    Current Diet Order: Puree  Current Nutrition Support Formula Ordered: (Discontinued)  Oral Nutrition Supplement: Boost Plus    Evaluation of Received Nutrient/Fluid Intake    IV Fluid (mL): 600  Energy Calories Required: not meeting needs  Protein Required: not meeting needs  Fluid Required: other (see comments)(Per MD or 1 mL/kcal)  Comments: LBM 1/4  Tolerance: tolerating  % Intake of Estimated Energy Needs: 0 - 25 %  % Meal Intake: 0 - 25 %    Nutrition Risk    Level of Risk/Frequency of Follow-up: low     Assessment and Plan    Malnutrition    Malnutrition in the context of Acute Illness/Injury    Related to (etiology):  Decreased appetite, dysphagia     Signs and Symptoms (as evidenced by):  Energy Intake: <75% of estimated energy requirement for Unsure   Body Fat Depletion: severe depletion of orbitals and triceps   Muscle Mass Depletion: severe depletion of temples, clavicle region, scapular region and lower extremities     Nutrition Diagnosis Status:  Continues      Monitor and Evaluation    Food and Nutrient Intake: energy intake, food and beverage intake, enteral nutrition intake  Food and Nutrient Adminstration: diet order, enteral and parenteral nutrition administration  Physical Activity and Function: nutrition-related ADLs and IADLs  Anthropometric Measurements: weight, weight change  Biochemical Data, Medical Tests and Procedures: lipid profile, inflammatory profile,  glucose/endocrine profile, gastrointestinal profile, electrolyte and renal panel  Nutrition-Focused Physical Findings: overall appearance     Nutrition Follow-Up    RD Follow-up?: Yes

## 2019-01-04 NOTE — H&P (VIEW-ONLY)
[facility]  Gastroenterology  Consult Note    Patient Name: Cesilia Delgado  MRN: [id number]  Admission Date: 12/22/2018  Hospital Length of Stay: 12 days  Code Status: Full Code   Attending Provider: Simeon Kaye MD   Consulting Provider: Armand Ramirez MD  Primary Care Physician: Jena Alvarez MD  Principal Problem:Hypernatremia    Inpatient consult to Gastroenterology  Consult performed by: Armand Ramirez MD  Consult ordered by: Simeon Kaye MD        Subjective:     HPI:  Ms. Delgado is an 87yo woman with PMH of CKD, HTN, pulmonary HTN, former smoker, dementia, and Fe deficiency anemia who is brought in by her daughter in law with complaints of 1-2 weeks onset of altered mental status. Admitted with dehydration and hypernatremia, which have improved. Patient is severely demented, and we have been consulted for PEG tube evaluation.  The patient is altered with little communication. She is cachectic and laying in bed. Does not appear in discomfort.        Past Medical History:   Diagnosis Date    Anemia     Asthma     CKD (chronic kidney disease), stage III     Dementia     Hypertension 5/28/2013    Pelvic mass in female     Pulmonary hypertension        Past Surgical History:   Procedure Laterality Date    ESOPHAGOGASTRODUODENOSCOPY      ESOPHAGOGASTRODUODENOSCOPY (EGD) N/A 12/20/2016    Performed by Eitan Coles MD at [facility] ([address] [place] [address])    HIP SURGERY Right        Review of patient's allergies indicates:  No Known Allergies  Family History     Problem Relation (Age of Onset)    Cancer Sister    Colon polyps Son    Diabetes Sister, Son    Heart disease Sister    Hypertension Mother        Tobacco Use    Smoking status: Former Smoker     Start date: 7/1/2007    Smokeless tobacco: Never Used   Substance and Sexual Activity    Alcohol use: No     Alcohol/week: 0.0 oz    Drug use: No    Sexual activity: Yes     Partners: Male     Review of Systems   Unable to perform ROS:  Dementia     Objective:     Vital Signs (Most Recent):  Temp: 98.2 °F (36.8 °C) (01/04/19 1701)  Pulse: 69 (01/04/19 1701)  Resp: 16 (01/04/19 1701)  BP: 113/82 (01/04/19 1701)  SpO2: 95 % (01/04/19 1701) Vital Signs (24h Range):  Temp:  [94.4 °F (34.7 °C)-98.2 °F (36.8 °C)] 98.2 °F (36.8 °C)  Pulse:  [60-71] 69  Resp:  [16-20] 16  SpO2:  [95 %-97 %] 95 %  BP: (113-187)/(62-82) 113/82     Weight: 34.5 kg (76 lb) (12/31/18 0757)  Body mass index is 13.9 kg/m².      Intake/Output Summary (Last 24 hours) at 1/4/2019 1751  Last data filed at 1/4/2019 0500  Gross per 24 hour   Intake 600 ml   Output --   Net 600 ml       Lines/Drains/Airways     Drain            Female External Urinary Catheter 12/27/18 2300 7 days          Peripheral Intravenous Line                 Midline Catheter Insertion/Assessment  - Single Lumen 12/24/18 1459 Right basilic vein (medial side of arm) 18g x 8cm 11 days                Physical Exam   Constitutional: She appears cachectic. No distress.   HENT:   Head: Normocephalic.   Eyes: Conjunctivae are normal. No scleral icterus.   Neck: Normal range of motion. Neck supple.   Cardiovascular: Normal rate and regular rhythm.   Pulmonary/Chest: Effort normal and breath sounds normal.   Abdominal: Bowel sounds are normal. She exhibits no distension and no mass. There is tenderness. There is guarding. There is no rebound.   Musculoskeletal: Normal range of motion.   Skin: Skin is warm and dry. There is pallor.       Significant Labs:  CBC:   Recent Labs   Lab 01/03/19  0711 01/04/19 0445   WBC 5.73 9.90   HGB 7.2* 8.2*   HCT 22.0* 25.0*    162     BMP:   Recent Labs   Lab 01/04/19  0445   GLU 99      K 3.9      CO2 26   BUN 16   CREATININE 1.9*   CALCIUM 10.1   MG 1.5*     CMP:   Recent Labs   Lab 01/04/19  0445   GLU 99   CALCIUM 10.1   ALBUMIN 2.2*      K 3.9   CO2 26      BUN 16   CREATININE 1.9*     Coagulation: No results for input(s): PT, INR, APTT in the last 48  hours.  Lipase: No results for input(s): LIPASE in the last 48 hours.    Significant Imaging:  Imaging results within the past 24 hours have been reviewed.    Assessment/Plan:     Dysphagia    Patient is an 87 y/o female with past history of dementia, and currently debilitated, GI consulted for evaluation for PEG tube. SLT evaluated patient and recommended pureed diet with thin liquids with observation. After discussion with daughter in law (Her POA) she wants to proceed with PEG tube. We are concerned about abdominal exam today.     -Please obtain CT abdomen without contrast to rule out abdominal pathology and assess anatomy.  -If negative we will plan for PEG tube early next week. Will update team on timing         Thank you for your consult. I will follow-up with patient. Please contact us if you have any additional questions.    Ranjit Ramirez MD  Gastroenterology  [facility]

## 2019-01-05 LAB
ALBUMIN SERPL BCP-MCNC: 2.1 G/DL
ANION GAP SERPL CALC-SCNC: 5 MMOL/L
BACTERIA #/AREA URNS AUTO: ABNORMAL /HPF
BACTERIA BLD CULT: NORMAL
BACTERIA BLD CULT: NORMAL
BASOPHILS # BLD AUTO: 0.02 K/UL
BASOPHILS NFR BLD: 0.1 %
BILIRUB UR QL STRIP: NEGATIVE
BUN SERPL-MCNC: 14 MG/DL
CALCIUM SERPL-MCNC: 9.6 MG/DL
CHLORIDE SERPL-SCNC: 109 MMOL/L
CLARITY UR REFRACT.AUTO: ABNORMAL
CO2 SERPL-SCNC: 24 MMOL/L
COLOR UR AUTO: ABNORMAL
CREAT SERPL-MCNC: 1.8 MG/DL
DIFFERENTIAL METHOD: ABNORMAL
EOSINOPHIL # BLD AUTO: 0.1 K/UL
EOSINOPHIL NFR BLD: 1 %
ERYTHROCYTE [DISTWIDTH] IN BLOOD BY AUTOMATED COUNT: 18.4 %
EST. GFR  (AFRICAN AMERICAN): 28.6 ML/MIN/1.73 M^2
EST. GFR  (NON AFRICAN AMERICAN): 24.8 ML/MIN/1.73 M^2
GLUCOSE SERPL-MCNC: 86 MG/DL
GLUCOSE UR QL STRIP: NEGATIVE
HCT VFR BLD AUTO: 23.2 %
HGB BLD-MCNC: 7.5 G/DL
HGB UR QL STRIP: ABNORMAL
HYALINE CASTS UR QL AUTO: 0 /LPF
IMM GRANULOCYTES # BLD AUTO: 0.04 K/UL
IMM GRANULOCYTES NFR BLD AUTO: 0.3 %
KETONES UR QL STRIP: NEGATIVE
LEUKOCYTE ESTERASE UR QL STRIP: ABNORMAL
LYMPHOCYTES # BLD AUTO: 1.4 K/UL
LYMPHOCYTES NFR BLD: 10 %
MAGNESIUM SERPL-MCNC: 1.5 MG/DL
MCH RBC QN AUTO: 24.5 PG
MCHC RBC AUTO-ENTMCNC: 32.3 G/DL
MCV RBC AUTO: 76 FL
MICROSCOPIC COMMENT: ABNORMAL
MONOCYTES # BLD AUTO: 0.7 K/UL
MONOCYTES NFR BLD: 4.8 %
NEUTROPHILS # BLD AUTO: 11.3 K/UL
NEUTROPHILS NFR BLD: 83.8 %
NITRITE UR QL STRIP: POSITIVE
NRBC BLD-RTO: 0 /100 WBC
PH UR STRIP: 6 [PH] (ref 5–8)
PHOSPHATE SERPL-MCNC: 2.7 MG/DL
PHOSPHATE SERPL-MCNC: 2.7 MG/DL
PLATELET # BLD AUTO: 188 K/UL
PMV BLD AUTO: 11.6 FL
POCT GLUCOSE: 100 MG/DL (ref 70–110)
POCT GLUCOSE: 109 MG/DL (ref 70–110)
POCT GLUCOSE: 113 MG/DL (ref 70–110)
POCT GLUCOSE: 87 MG/DL (ref 70–110)
POTASSIUM SERPL-SCNC: 4.3 MMOL/L
PROT UR QL STRIP: ABNORMAL
RBC # BLD AUTO: 3.06 M/UL
RBC #/AREA URNS AUTO: >100 /HPF (ref 0–4)
SODIUM SERPL-SCNC: 138 MMOL/L
SP GR UR STRIP: 1.01 (ref 1–1.03)
SQUAMOUS #/AREA URNS AUTO: 1 /HPF
URN SPEC COLLECT METH UR: ABNORMAL
WBC # BLD AUTO: 13.51 K/UL
WBC #/AREA URNS AUTO: >100 /HPF (ref 0–5)
WBC CLUMPS UR QL AUTO: ABNORMAL

## 2019-01-05 PROCEDURE — 83735 ASSAY OF MAGNESIUM: CPT

## 2019-01-05 PROCEDURE — 87086 URINE CULTURE/COLONY COUNT: CPT

## 2019-01-05 PROCEDURE — 36415 COLL VENOUS BLD VENIPUNCTURE: CPT

## 2019-01-05 PROCEDURE — 80069 RENAL FUNCTION PANEL: CPT

## 2019-01-05 PROCEDURE — 25000003 PHARM REV CODE 250: Performed by: STUDENT IN AN ORGANIZED HEALTH CARE EDUCATION/TRAINING PROGRAM

## 2019-01-05 PROCEDURE — 84100 ASSAY OF PHOSPHORUS: CPT

## 2019-01-05 PROCEDURE — 81001 URINALYSIS AUTO W/SCOPE: CPT

## 2019-01-05 PROCEDURE — 87186 SC STD MICRODIL/AGAR DIL: CPT

## 2019-01-05 PROCEDURE — 99232 SBSQ HOSP IP/OBS MODERATE 35: CPT | Mod: ,,, | Performed by: INTERNAL MEDICINE

## 2019-01-05 PROCEDURE — 87077 CULTURE AEROBIC IDENTIFY: CPT

## 2019-01-05 PROCEDURE — 11000001 HC ACUTE MED/SURG PRIVATE ROOM

## 2019-01-05 PROCEDURE — 25000003 PHARM REV CODE 250: Performed by: HOSPITALIST

## 2019-01-05 PROCEDURE — 99232 PR SUBSEQUENT HOSPITAL CARE,LEVL II: ICD-10-PCS | Mod: ,,, | Performed by: INTERNAL MEDICINE

## 2019-01-05 PROCEDURE — 87088 URINE BACTERIA CULTURE: CPT

## 2019-01-05 PROCEDURE — 85025 COMPLETE CBC W/AUTO DIFF WBC: CPT

## 2019-01-05 PROCEDURE — 63600175 PHARM REV CODE 636 W HCPCS: Performed by: PHYSICIAN ASSISTANT

## 2019-01-05 RX ORDER — CEFTRIAXONE 1 G/1
1 INJECTION, POWDER, FOR SOLUTION INTRAMUSCULAR; INTRAVENOUS
Status: COMPLETED | OUTPATIENT
Start: 2019-01-05 | End: 2019-01-08

## 2019-01-05 RX ADMIN — AMLODIPINE BESYLATE 10 MG: 10 TABLET ORAL at 09:01

## 2019-01-05 RX ADMIN — THERA TABS 1 TABLET: TAB at 09:01

## 2019-01-05 RX ADMIN — TRAZODONE HYDROCHLORIDE 50 MG: 50 TABLET ORAL at 09:01

## 2019-01-05 RX ADMIN — VITAMIN D, TAB 1000IU (100/BT) 1000 UNITS: 25 TAB at 09:01

## 2019-01-05 RX ADMIN — FOLIC ACID 1 MG: 1 TABLET ORAL at 09:01

## 2019-01-05 RX ADMIN — CEFTRIAXONE SODIUM 1 G: 1 INJECTION, POWDER, FOR SOLUTION INTRAMUSCULAR; INTRAVENOUS at 09:01

## 2019-01-05 RX ADMIN — Medication 100 MG: at 09:01

## 2019-01-05 NOTE — PROGRESS NOTES
Hospital Medicine  Progress note    I personally performed the history, physical exam and medical decision making: and confirmed the accuracy of the information in the transcribed note.  Simeon Kaye M.D.  Pager: [phone]  Cell Phone: [phone]      Team: [facility] MED B Simeon Kaye MD  Admit Date: 12/22/2018  IESHA 1/9/2019  Code status: Full Code    Principal Problem:  Hypernatremia    Interval hx: GI states they need CT abdomen without contrast to rule out abdominal pathology and access anatomy. If negative plan for PEG early next week.     ROS   Unable to perform ROS: encephalpathy  improving - AAOX 1 today  denies pain      PEx  Temp:  [97.5 °F (36.4 °C)-98.2 °F (36.8 °C)]   Pulse:  [68-90]   Resp:  [16-20]   BP: (110-145)/(53-83)   SpO2:  [95 %-97 %]     Intake/Output Summary (Last 24 hours) at 1/5/2019 1505  Last data filed at 1/5/2019 0600  Gross per 24 hour   Intake 170 ml   Output 100 ml   Net 70 ml     Constitutional: No distress.   Severely frail & thin appearing elderly woman   HENT:   Head: Normocephalic and atraumatic.   Facial muscle waisting    Eyes: No scleral icterus.    Neck: Normal range of motion. Neck supple. No JVD present. No thyromegaly present. NGT insitu   Cardiovascular: Normal rate, regular rhythm and normal heart sounds.   Pulmonary/Chest: Effort normal and breath sounds normal. No stridor. No respiratory distress. She has no wheezes. She has no rales.   Abdominal: Soft. Bowel sounds are normal. She exhibits no distension. There is no tenderness. There is no guarding.   Musculoskeletal: She exhibits no edema, tenderness or deformity.   L. Elbow healing bruise    Neurological: She is alert.   encephalpathy  improving - AAOX 1 today  Skin: Skin is warm and dry. She is not diaphoretic.   Psychiatric: She has a normal mood and affect.     Recent Labs   Lab 01/03/19  0711 01/04/19  0445 01/05/19  0451   WBC 5.73 9.90 13.51*   HGB 7.2* 8.2* 7.5*   HCT 22.0* 25.0* 23.2*    162 188      Recent Labs   Lab 01/03/19  0711 01/03/19  0712 01/04/19  0445 01/05/19  0451     --  140 138   K 4.0  --  3.9 4.3   *  --  108 109   CO2 28  --  26 24   BUN 20  --  16 14   CREATININE 2.0*  --  1.9* 1.8*   GLU 87  --  99 86   CALCIUM 9.9  --  10.1 9.6   MG  --  1.6 1.5* 1.5*   PHOS 2.7 2.7 2.7  2.7 2.7  2.7     Recent Labs   Lab 12/30/18  1832  01/03/19  0711 01/04/19  0445 01/05/19  0451   ALKPHOS 56  --   --   --   --    ALT 35  --   --   --   --    AST 41*  --   --   --   --    ALBUMIN 2.3*   < > 2.1* 2.2* 2.1*   PROT 5.9*  --   --   --   --    BILITOT 0.3  --   --   --   --     < > = values in this interval not displayed.      Recent Labs   Lab 01/04/19  0619 01/04/19  1248 01/04/19  1820 01/05/19  0009 01/05/19  0620 01/05/19  1208   POCTGLUCOSE 98 108 116* 109 87 113*     No results for input(s): CPK, CPKMB, MB, TROPONINI in the last 72 hours.    Scheduled Meds:   amLODIPine  10 mg Oral Daily    calcium-vitamin D3  1 tablet Per NG tube Once    folic acid  1 mg Oral Daily    heparin (porcine)  5,000 Units Subcutaneous Q8H    multivitamin  1 tablet Oral Daily    thiamine  100 mg Oral Daily    traZODone  50 mg Oral QHS    vitamin D  1,000 Units Oral Daily     Continuous Infusions:   dextrose 5 % and 0.45 % NaCl 50 mL/hr at 01/04/19 1505     As Needed:  bisacodyl, sodium chloride 0.9%    Active Hospital Problems    Diagnosis  POA    Palliative care encounter [Z51.5]  Not Applicable    Goals of care, counseling/discussion [Z71.89]  Not Applicable    Insomnia [G47.00]  Yes    Dysphagia [R13.10]  Yes    Electrolyte disturbance [E87.8]  Yes    Caregiver burden [Z63.6]  Not Applicable    Mood disturbance [R45.86]  Yes    Malnutrition [E46]  Yes     Chronic    Essential hypertension [I10]  Yes     Chronic      Resolved Hospital Problems    Diagnosis Date Resolved POA    *Hypernatremia [E87.0] 12/28/2018 Yes    Acute encephalopathy [G93.40] 12/28/2018 No    Stage 4 chronic kidney  disease [N18.4] 12/28/2018 Yes     Chronic    ODILIA (acute kidney injury) [N17.9] 12/28/2018 Yes       Overview  Ms. Delgado is an 89yo woman with PMH of CKD, HTN, pulmonary HTN, former smoker, dementia, and Fe deficiency anemia who is brought in by her daughter in law with complaints of 1-2 weeks onset of altered mental status. GI states they need CT abdomen without contrast to rule out abdominal pathology and access anatomy. If negative plan for PEG early next week. POA insistent on PEG tube, even with multiple sources stating it is not warranted.      Assessment and Plan for Problems addressed today:    Hypernatremia  · Na 174 upon admission  · Serial BMPs q6hr  · Suspect 2/2 dehydration from advanced dementia  · Patient down to Na 141on d5w at 75cc/hr    Insomnia  · On trazodone    Dysphagia  · S/P SLP evaluation, likely encephalopathy  · Started clear liquids  · NGT placed 12/23 and started trickle feeds  · NGT feeds advanced as tolerated  · Poor oral intake, advanced to puree diet  · Will consult GI for PEG tube placement    Mood disturbance  · R/O depression post son's demise    Acute encephalopathy   · Improving  · AAOx1 today  · Likely metabolic  · On thiamine, folate and vitamin D for malnutrition   · Dietician consulted  · CT head - no evidence of acute intracranial hemorrhage    Malnutrition  · Dietician consulted  · Low prealbumin    Acute on chronic kidney disease  · Improving with IV hydration    Essential hypertension  · Controlled on amlodipine      Discharge plan and follow up    Provider    I personally scribed for Simeon Kaye MD on 01/05/2019 at 11:39 AM. Electronically signed by sadia Borrero III on 01/05/2019 at 11:39 AM

## 2019-01-05 NOTE — PLAN OF CARE
Problem: Adult Inpatient Plan of Care  Goal: Plan of Care Review  Pt free of falls and injury. Disoriented, responds inappropriately. Turned and repositioned with wedge, perineal care provided, skin barrier applied, heel boots on, thermal blanket on, bloody discharge noted upon providing perineal care, Med team B notified,  bed low, breaks locked, SR up x2, call light within reach, will continue to monitor.

## 2019-01-05 NOTE — MEDICAL/APP STUDENT
Hospital Medicine  Progress note      [place] [facility] [place] [facility] [place] Derrek Borrero  Admit Date: 12/22/2018  IESHA 1/9/2019  Code status: Full Code    Principal Problem:  Hypernatremia    Interval hx: GI states they need CT abdomen without contrast to rule out abdominal pathology and access anatomy. If negative plan for PEG early next week.     ROS   Unable to perform ROS: encephalpathy  improving - AAOX 1 today  denies pain      PEx  Temp:  [94.4 °F (34.7 °C)-98.2 °F (36.8 °C)]   Pulse:  [60-74]   Resp:  [16-20]   BP: (110-134)/(53-83)   SpO2:  [95 %-97 %]     Intake/Output Summary (Last 24 hours) at 1/5/2019 0828  Last data filed at 1/5/2019 0600  Gross per 24 hour   Intake 170 ml   Output 100 ml   Net 70 ml     Constitutional: No distress.   Severely frail & thin appearing elderly woman   HENT:   Head: Normocephalic and atraumatic.   Facial muscle waisting    Eyes: No scleral icterus.    Neck: Normal range of motion. Neck supple. No JVD present. No thyromegaly present. NGT insitu   Cardiovascular: Normal rate, regular rhythm and normal heart sounds.   Pulmonary/Chest: Effort normal and breath sounds normal. No stridor. No respiratory distress. She has no wheezes. She has no rales.   Abdominal: Soft. Bowel sounds are normal. She exhibits no distension. There is no tenderness. There is no guarding.   Musculoskeletal: She exhibits no edema, tenderness or deformity.   L. Elbow healing bruise    Neurological: She is alert.   encephalpathy  improving - AAOX 1 today  Skin: Skin is warm and dry. She is not diaphoretic.   Psychiatric: She has a normal mood and affect.     Recent Labs   Lab 01/03/19  0711 01/04/19  0445 01/05/19  0451   WBC 5.73 9.90 13.51*   HGB 7.2* 8.2* 7.5*   HCT 22.0* 25.0* 23.2*    162 188     Recent Labs   Lab 01/03/19  0711 01/03/19  0712 01/04/19  0445 01/05/19  0451     --  140 138   K 4.0  --  3.9 4.3   *  --  108 109   CO2 28  --  26 24   BUN 20  --  16 14   CREATININE 2.0*  --  1.9* 1.8*    GLU 87  --  99 86   CALCIUM 9.9  --  10.1 9.6   MG  --  1.6 1.5* 1.5*   PHOS 2.7 2.7 2.7  2.7 2.7  2.7     Recent Labs   Lab 12/29/18  1405  12/30/18  1832  01/03/19  0711 01/04/19  0445 01/05/19  0451   ALKPHOS 59  --  56  --   --   --   --    ALT 29  --  35  --   --   --   --    AST 34  --  41*  --   --   --   --    ALBUMIN 2.3*   < > 2.3*   < > 2.1* 2.2* 2.1*   PROT 5.8*  --  5.9*  --   --   --   --    BILITOT 0.4  --  0.3  --   --   --   --     < > = values in this interval not displayed.      Recent Labs   Lab 01/04/19  0007 01/04/19  0619 01/04/19  1248 01/04/19  1820 01/05/19  0009 01/05/19  0620   POCTGLUCOSE 99 98 108 116* 109 87     No results for input(s): CPK, CPKMB, MB, TROPONINI in the last 72 hours.    Scheduled Meds:   amLODIPine  10 mg Oral Daily    calcium-vitamin D3  1 tablet Per NG tube Once    folic acid  1 mg Oral Daily    heparin (porcine)  5,000 Units Subcutaneous Q8H    multivitamin  1 tablet Oral Daily    thiamine  100 mg Oral Daily    traZODone  50 mg Oral QHS    vitamin D  1,000 Units Oral Daily     Continuous Infusions:   dextrose 5 % and 0.45 % NaCl 50 mL/hr at 01/04/19 1505     As Needed:  bisacodyl, sodium chloride 0.9%    Active Hospital Problems    Diagnosis  POA    Palliative care encounter [Z51.5]  Not Applicable    Goals of care, counseling/discussion [Z71.89]  Not Applicable    Insomnia [G47.00]  Yes    Dysphagia [R13.10]  Yes    Electrolyte disturbance [E87.8]  Yes    Caregiver burden [Z63.6]  Not Applicable    Mood disturbance [R45.86]  Yes    Malnutrition [E46]  Yes     Chronic    Essential hypertension [I10]  Yes     Chronic      Resolved Hospital Problems    Diagnosis Date Resolved POA    *Hypernatremia [E87.0] 12/28/2018 Yes    Acute encephalopathy [G93.40] 12/28/2018 No    Stage 4 chronic kidney disease [N18.4] 12/28/2018 Yes     Chronic    ODILIA (acute kidney injury) [N17.9] 12/28/2018 Yes       Overview  Ms. Delgado is an 89yo woman with PMH of  CKD, HTN, pulmonary HTN, former smoker, dementia, and Fe deficiency anemia who is brought in by her daughter in law with complaints of 1-2 weeks onset of altered mental status. GI states they need CT abdomen without contrast to rule out abdominal pathology and access anatomy. If negative plan for PEG early next week. POA insistent on PEG tube, even with multiple sources stating it is not warranted.      Assessment and Plan for Problems addressed today:    Hypernatremia  · Na 174 upon admission  · Serial BMPs q6hr  · Suspect 2/2 dehydration from advanced dementia  · Patient down to Na 141on d5w at 75cc/hr    Insomnia  · On trazodone    Dysphagia  · S/P SLP evaluation, likely encephalopathy  · Started clear liquids  · NGT placed 12/23 and started trickle feeds  · NGT feeds advanced as tolerated  · Poor oral intake, advanced to puree diet  · Will consult GI for PEG tube placement    Mood disturbance  · R/O depression post son's demise    Acute encephalopathy   · Improving  · AAOx1 today  · Likely metabolic  · On thiamine, folate and vitamin D for malnutrition   · Dietician consulted  · CT head - no evidence of acute intracranial hemorrhage    Malnutrition  · Dietician consulted  · Low prealbumin    Acute on chronic kidney disease  · Improving with IV hydration    Essential hypertension  · Controlled on amlodipine      Discharge plan and follow up    Provider    I personally scribed for Simeon Kaye MD on 01/05/2019 at 11:39 AM. Electronically signed by sadia Borrero III on 01/05/2019 at 11:39 AM

## 2019-01-06 LAB
ALBUMIN SERPL BCP-MCNC: 1.8 G/DL
ANION GAP SERPL CALC-SCNC: 4 MMOL/L
BASOPHILS # BLD AUTO: 0.03 K/UL
BASOPHILS NFR BLD: 0.3 %
BUN SERPL-MCNC: 14 MG/DL
CALCIUM SERPL-MCNC: 9.4 MG/DL
CHLORIDE SERPL-SCNC: 110 MMOL/L
CO2 SERPL-SCNC: 25 MMOL/L
CREAT SERPL-MCNC: 1.7 MG/DL
DIFFERENTIAL METHOD: ABNORMAL
EOSINOPHIL # BLD AUTO: 0.1 K/UL
EOSINOPHIL NFR BLD: 1 %
ERYTHROCYTE [DISTWIDTH] IN BLOOD BY AUTOMATED COUNT: 18.3 %
EST. GFR  (AFRICAN AMERICAN): 30.6 ML/MIN/1.73 M^2
EST. GFR  (NON AFRICAN AMERICAN): 26.5 ML/MIN/1.73 M^2
GLUCOSE SERPL-MCNC: 83 MG/DL
HCT VFR BLD AUTO: 21.9 %
HGB BLD-MCNC: 7.2 G/DL
IMM GRANULOCYTES # BLD AUTO: 0.03 K/UL
IMM GRANULOCYTES NFR BLD AUTO: 0.3 %
LYMPHOCYTES # BLD AUTO: 1.1 K/UL
LYMPHOCYTES NFR BLD: 9.7 %
MAGNESIUM SERPL-MCNC: 1.4 MG/DL
MCH RBC QN AUTO: 25.1 PG
MCHC RBC AUTO-ENTMCNC: 32.9 G/DL
MCV RBC AUTO: 76 FL
MONOCYTES # BLD AUTO: 0.7 K/UL
MONOCYTES NFR BLD: 5.9 %
NEUTROPHILS # BLD AUTO: 9.6 K/UL
NEUTROPHILS NFR BLD: 82.8 %
NRBC BLD-RTO: 0 /100 WBC
PHOSPHATE SERPL-MCNC: 2.4 MG/DL
PHOSPHATE SERPL-MCNC: 2.4 MG/DL
PLATELET # BLD AUTO: 164 K/UL
PMV BLD AUTO: 11.2 FL
POCT GLUCOSE: 115 MG/DL (ref 70–110)
POCT GLUCOSE: 84 MG/DL (ref 70–110)
POCT GLUCOSE: 90 MG/DL (ref 70–110)
POTASSIUM SERPL-SCNC: 3.9 MMOL/L
RBC # BLD AUTO: 2.87 M/UL
SODIUM SERPL-SCNC: 139 MMOL/L
WBC # BLD AUTO: 11.62 K/UL

## 2019-01-06 PROCEDURE — 63600175 PHARM REV CODE 636 W HCPCS: Performed by: STUDENT IN AN ORGANIZED HEALTH CARE EDUCATION/TRAINING PROGRAM

## 2019-01-06 PROCEDURE — 99232 SBSQ HOSP IP/OBS MODERATE 35: CPT | Mod: ,,, | Performed by: INTERNAL MEDICINE

## 2019-01-06 PROCEDURE — 84100 ASSAY OF PHOSPHORUS: CPT

## 2019-01-06 PROCEDURE — 80069 RENAL FUNCTION PANEL: CPT

## 2019-01-06 PROCEDURE — 25000003 PHARM REV CODE 250: Performed by: STUDENT IN AN ORGANIZED HEALTH CARE EDUCATION/TRAINING PROGRAM

## 2019-01-06 PROCEDURE — 99232 PR SUBSEQUENT HOSPITAL CARE,LEVL II: ICD-10-PCS | Mod: ,,, | Performed by: INTERNAL MEDICINE

## 2019-01-06 PROCEDURE — S5010 5% DEXTROSE AND 0.45% SALINE: HCPCS | Performed by: INTERNAL MEDICINE

## 2019-01-06 PROCEDURE — 25000003 PHARM REV CODE 250: Performed by: INTERNAL MEDICINE

## 2019-01-06 PROCEDURE — 11000001 HC ACUTE MED/SURG PRIVATE ROOM

## 2019-01-06 PROCEDURE — 85025 COMPLETE CBC W/AUTO DIFF WBC: CPT

## 2019-01-06 PROCEDURE — 63600175 PHARM REV CODE 636 W HCPCS: Performed by: PHYSICIAN ASSISTANT

## 2019-01-06 PROCEDURE — 83735 ASSAY OF MAGNESIUM: CPT

## 2019-01-06 PROCEDURE — 25000003 PHARM REV CODE 250: Performed by: HOSPITALIST

## 2019-01-06 RX ADMIN — CEFTRIAXONE SODIUM 1 G: 1 INJECTION, POWDER, FOR SOLUTION INTRAMUSCULAR; INTRAVENOUS at 09:01

## 2019-01-06 RX ADMIN — TRAZODONE HYDROCHLORIDE 50 MG: 50 TABLET ORAL at 09:01

## 2019-01-06 RX ADMIN — HEPARIN SODIUM 5000 UNITS: 5000 INJECTION, SOLUTION INTRAVENOUS; SUBCUTANEOUS at 02:01

## 2019-01-06 RX ADMIN — AMLODIPINE BESYLATE 10 MG: 10 TABLET ORAL at 08:01

## 2019-01-06 RX ADMIN — DEXTROSE AND SODIUM CHLORIDE: 5; .45 INJECTION, SOLUTION INTRAVENOUS at 02:01

## 2019-01-06 RX ADMIN — VITAMIN D, TAB 1000IU (100/BT) 1000 UNITS: 25 TAB at 08:01

## 2019-01-06 RX ADMIN — THERA TABS 1 TABLET: TAB at 08:01

## 2019-01-06 RX ADMIN — FOLIC ACID 1 MG: 1 TABLET ORAL at 08:01

## 2019-01-06 RX ADMIN — HEPARIN SODIUM 5000 UNITS: 5000 INJECTION, SOLUTION INTRAVENOUS; SUBCUTANEOUS at 09:01

## 2019-01-06 RX ADMIN — Medication 100 MG: at 08:01

## 2019-01-06 NOTE — PLAN OF CARE
Problem: Adult Inpatient Plan of Care  Goal: Plan of Care Review  Pt free of falls and injury. Disoriented, lethargic, turned and repositioned with wedge, perineal care provided,  heel boots on, thermal blanket applied,heparin held per MD order, antibiotic IV push for UTI given , bed low, breaks locked, SR up x2, call light within reach, will continue to monitor.

## 2019-01-06 NOTE — MEDICAL/APP STUDENT
Hospital Medicine  Progress note      Team: [facility] [place] [facility] MED B Derrek Borrero  Admit Date: 12/22/2018  IESHA 1/9/2019  Code status: Full Code    Principal Problem:  Hypernatremia    Interval hx: CT abdomen shows increased stool burden throughout the colon with large rectal fecal ball. Will await GI scheduling of PEG.    ROS   Unable to perform ROS: encephalpathy  improving - AAOX 1 today  denies pain      PEx  Temp:  [98.1 °F (36.7 °C)-98.4 °F (36.9 °C)]   Pulse:  [69-90]   Resp:  [16-18]   BP: (120-145)/(62-86)   SpO2:  [94 %-97 %]     Intake/Output Summary (Last 24 hours) at 1/6/2019 0833  Last data filed at 1/5/2019 2100  Gross per 24 hour   Intake 30 ml   Output --   Net 30 ml     Constitutional: No distress.   Severely frail & thin appearing elderly woman   HENT:   Head: Normocephalic and atraumatic.   Facial muscle waisting    Eyes: No scleral icterus.    Neck: Normal range of motion. Neck supple. No JVD present. No thyromegaly present. NGT insitu   Cardiovascular: Normal rate, regular rhythm and normal heart sounds.   Pulmonary/Chest: Effort normal and breath sounds normal. No stridor. No respiratory distress. She has no wheezes. She has no rales.   Abdominal: Soft. Bowel sounds are normal. She exhibits no distension. There is no tenderness. There is no guarding.   Musculoskeletal: She exhibits no edema, tenderness or deformity.   L. Elbow healing bruise    Neurological: She is alert.   encephalpathy  improving - AAOX 1 today  Skin: Skin is warm and dry. She is not diaphoretic.   Psychiatric: She has a normal mood and affect.     Recent Labs   Lab 01/04/19  0445 01/05/19  0451 01/06/19  0549   WBC 9.90 13.51* 11.62   HGB 8.2* 7.5* 7.2*   HCT 25.0* 23.2* 21.9*    188 164     Recent Labs   Lab 01/04/19  0445 01/05/19  0451 01/06/19  0549    138 139   K 3.9 4.3 3.9    109 110   CO2 26 24 25   BUN 16 14 14   CREATININE 1.9* 1.8* 1.7*   GLU 99 86 83   CALCIUM 10.1 9.6 9.4   MG 1.5* 1.5* 1.4*   PHOS 2.7   2.7 2.7  2.7 2.4*  2.4*     Recent Labs   Lab 12/30/18  1832  01/04/19  0445 01/05/19  0451 01/06/19  0549   ALKPHOS 56  --   --   --   --    ALT 35  --   --   --   --    AST 41*  --   --   --   --    ALBUMIN 2.3*   < > 2.2* 2.1* 1.8*   PROT 5.9*  --   --   --   --    BILITOT 0.3  --   --   --   --     < > = values in this interval not displayed.      Recent Labs   Lab 01/05/19  0009 01/05/19  0620 01/05/19  1208 01/05/19  1723 01/05/19  2309 01/06/19  0452   POCTGLUCOSE 109 87 113* 100 84 90     No results for input(s): CPK, CPKMB, MB, TROPONINI in the last 72 hours.    Scheduled Meds:   amLODIPine  10 mg Oral Daily    calcium-vitamin D3  1 tablet Per NG tube Once    cefTRIAXone (ROCEPHIN) IVPB  1 g Intravenous Q24H    folic acid  1 mg Oral Daily    heparin (porcine)  5,000 Units Subcutaneous Q8H    multivitamin  1 tablet Oral Daily    thiamine  100 mg Oral Daily    traZODone  50 mg Oral QHS    vitamin D  1,000 Units Oral Daily     Continuous Infusions:   dextrose 5 % and 0.45 % NaCl 50 mL/hr at 01/04/19 1505     As Needed:  bisacodyl, sodium chloride 0.9%    Active Hospital Problems    Diagnosis  POA    Palliative care encounter [Z51.5]  Not Applicable    Goals of care, counseling/discussion [Z71.89]  Not Applicable    Insomnia [G47.00]  Yes    Dysphagia [R13.10]  Yes    Electrolyte disturbance [E87.8]  Yes    Caregiver burden [Z63.6]  Not Applicable    Mood disturbance [R45.86]  Yes    Malnutrition [E46]  Yes     Chronic    Essential hypertension [I10]  Yes     Chronic      Resolved Hospital Problems    Diagnosis Date Resolved POA    *Hypernatremia [E87.0] 12/28/2018 Yes    Acute encephalopathy [G93.40] 12/28/2018 No    Stage 4 chronic kidney disease [N18.4] 12/28/2018 Yes     Chronic    ODILIA (acute kidney injury) [N17.9] 12/28/2018 Yes       Overview  Ms. Delgado is an 89yo woman with PMH of CKD, HTN, pulmonary HTN, former smoker, dementia, and Fe deficiency anemia who is brought in by  her daughter in law with complaints of 1-2 weeks onset of altered mental status. GI states they need CT abdomen without contrast to rule out abdominal pathology and access anatomy. If negative plan for PEG early next week. POA insistent on PEG tube, even with multiple sources stating it is not warranted.      Assessment and Plan for Problems addressed today:    Hypernatremia  · Na 174 upon admission  · Serial BMPs q6hr  · Suspect 2/2 dehydration from advanced dementia  · Patient down to Na 141on d5w at 75cc/hr    Insomnia  · On trazodone    Dysphagia  · S/P SLP evaluation, likely encephalopathy  · Started clear liquids  · NGT placed 12/23 and started trickle feeds  · NGT feeds advanced as tolerated  · Poor oral intake, advanced to puree diet  · Will consult GI for PEG tube placement    Mood disturbance  · R/O depression post son's demise    Acute encephalopathy   · Improving  · AAOx1 today  · Likely metabolic  · On thiamine, folate and vitamin D for malnutrition   · Dietician consulted  · CT head - no evidence of acute intracranial hemorrhage    Malnutrition  · Dietician consulted  · Low prealbumin    Acute on chronic kidney disease  · Improving with IV hydration    Essential hypertension  · Controlled on amlodipine      Discharge plan and follow up    Provider    I personally scribed for Simeon Kaye MD on 01/06/2019 at 2:10 PM. Electronically signed by sadia Borrero III on 01/06/2019 at 2:10 PM

## 2019-01-06 NOTE — MEDICAL/APP STUDENT
Hospital Medicine  Progress note    I personally performed the history, physical exam and medical decision making: and confirmed the accuracy of the information in the transcribed note.  Simeon Kaye M.D.  Pager: [phone]  Cell Phone: [phone]      Team: [facility] [place] [facility] MED B Simeon Kaye MD  Admit Date: 12/22/2018  IESHA 1/9/2019  Code status: Full Code    Principal Problem:  Hypernatremia    Interval hx: CT abdomen shows increased stool burden throughout the colon with large rectal fecal ball. Will await GI scheduling of PEG.    ROS   Unable to perform ROS: encephalpathy  improving - AAOX 1 today  denies pain      PEx  Temp:  [98.1 °F (36.7 °C)-98.5 °F (36.9 °C)]   Pulse:  [69-80]   Resp:  [16-18]   BP: (117-151)/(58-86)   SpO2:  [93 %-98 %]     Intake/Output Summary (Last 24 hours) at 1/6/2019 1613  Last data filed at 1/5/2019 2100  Gross per 24 hour   Intake 30 ml   Output --   Net 30 ml     Constitutional: No distress.   Severely frail & thin appearing elderly woman   HENT:   Head: Normocephalic and atraumatic.   Facial muscle waisting    Eyes: No scleral icterus.    Neck: Normal range of motion. Neck supple. No JVD present. No thyromegaly present. NGT insitu   Cardiovascular: Normal rate, regular rhythm and normal heart sounds.   Pulmonary/Chest: Effort normal and breath sounds normal. No stridor. No respiratory distress. She has no wheezes. She has no rales.   Abdominal: Soft. Bowel sounds are normal. She exhibits no distension. There is no tenderness. There is no guarding.   Musculoskeletal: She exhibits no edema, tenderness or deformity.   L. Elbow healing bruise    Neurological: She is alert.   encephalpathy  improving - AAOX 1 today  Skin: Skin is warm and dry. She is not diaphoretic.   Psychiatric: She has a normal mood and affect.     Recent Labs   Lab 01/04/19  0445 01/05/19  0451 01/06/19  0549   WBC 9.90 13.51* 11.62   HGB 8.2* 7.5* 7.2*   HCT 25.0* 23.2* 21.9*    188 164     Recent Labs   Lab  01/04/19  0445 01/05/19  0451 01/06/19  0549    138 139   K 3.9 4.3 3.9    109 110   CO2 26 24 25   BUN 16 14 14   CREATININE 1.9* 1.8* 1.7*   GLU 99 86 83   CALCIUM 10.1 9.6 9.4   MG 1.5* 1.5* 1.4*   PHOS 2.7  2.7 2.7  2.7 2.4*  2.4*     Recent Labs   Lab 12/30/18  1832  01/04/19  0445 01/05/19  0451 01/06/19  0549   ALKPHOS 56  --   --   --   --    ALT 35  --   --   --   --    AST 41*  --   --   --   --    ALBUMIN 2.3*   < > 2.2* 2.1* 1.8*   PROT 5.9*  --   --   --   --    BILITOT 0.3  --   --   --   --     < > = values in this interval not displayed.      Recent Labs   Lab 01/05/19  0009 01/05/19  0620 01/05/19  1208 01/05/19  1723 01/05/19  2309 01/06/19  0452   POCTGLUCOSE 109 87 113* 100 84 90     No results for input(s): CPK, CPKMB, MB, TROPONINI in the last 72 hours.    Scheduled Meds:   amLODIPine  10 mg Oral Daily    calcium-vitamin D3  1 tablet Per NG tube Once    cefTRIAXone (ROCEPHIN) IVPB  1 g Intravenous Q24H    folic acid  1 mg Oral Daily    heparin (porcine)  5,000 Units Subcutaneous Q8H    multivitamin  1 tablet Oral Daily    thiamine  100 mg Oral Daily    traZODone  50 mg Oral QHS    vitamin D  1,000 Units Oral Daily     Continuous Infusions:   dextrose 5 % and 0.45 % NaCl 50 mL/hr at 01/06/19 1402     As Needed:  bisacodyl, sodium chloride 0.9%    Active Hospital Problems    Diagnosis  POA    Palliative care encounter [Z51.5]  Not Applicable    Goals of care, counseling/discussion [Z71.89]  Not Applicable    Insomnia [G47.00]  Yes    Dysphagia [R13.10]  Yes    Electrolyte disturbance [E87.8]  Yes    Caregiver burden [Z63.6]  Not Applicable    Mood disturbance [R45.86]  Yes    Malnutrition [E46]  Yes     Chronic    Essential hypertension [I10]  Yes     Chronic      Resolved Hospital Problems    Diagnosis Date Resolved POA    *Hypernatremia [E87.0] 12/28/2018 Yes    Acute encephalopathy [G93.40] 12/28/2018 No    Stage 4 chronic kidney disease [N18.4] 12/28/2018  Yes     Chronic    ODILIA (acute kidney injury) [N17.9] 12/28/2018 Yes       Overview  Ms. Delgado is an 87yo woman with PMH of CKD, HTN, pulmonary HTN, former smoker, dementia, and Fe deficiency anemia who is brought in by her daughter in law with complaints of 1-2 weeks onset of altered mental status. GI states they need CT abdomen without contrast to rule out abdominal pathology and access anatomy. If negative plan for PEG early next week. POA insistent on PEG tube, even with multiple sources stating it is not warranted.      Assessment and Plan for Problems addressed today:    Hypernatremia  · Na 174 upon admission  · Serial BMPs q6hr  · Suspect 2/2 dehydration from advanced dementia  · Patient down to Na 141on d5w at 75cc/hr    Insomnia  · On trazodone    Dysphagia  · S/P SLP evaluation, likely encephalopathy  · Started clear liquids  · NGT placed 12/23 and started trickle feeds  · NGT feeds advanced as tolerated  · Poor oral intake, advanced to puree diet  · Will consult GI for PEG tube placement    Mood disturbance  · R/O depression post son's demise    Acute encephalopathy   · Improving  · AAOx1 today  · Likely metabolic  · On thiamine, folate and vitamin D for malnutrition   · Dietician consulted  · CT head - no evidence of acute intracranial hemorrhage    Malnutrition  · Dietician consulted  · Low prealbumin    Acute on chronic kidney disease  · Improving with IV hydration    Essential hypertension  · Controlled on amlodipine      Discharge plan and follow up    Provider    I personally scribed for Simeon Kaye MD on 01/06/2019 at 2:10 PM. Electronically signed by sadia Borrero III on 01/06/2019 at 2:10 PM

## 2019-01-06 NOTE — PROGRESS NOTES
Hospital Medicine  Progress note    I personally performed the history, physical exam and medical decision making: and confirmed the accuracy of the information in the transcribed note.  Simeon Kaye M.D.  Pager: [phone]  Cell Phone: [phone]    I personally performed the history, physical exam and medical decision making: and confirmed the accuracy of the information in the transcribed note.  Simeon Kaye M.D.  Pager: [phone]  Cell Phone: [phone]      Team: [facility] [place] [facility] MED B Simeon Kaye MD  Admit Date: 12/22/2018  IESHA 1/9/2019  Code status: Full Code    Principal Problem:  Hypernatremia    Interval hx: CT abdomen shows increased stool burden throughout the colon with large rectal fecal ball. Will await GI scheduling of PEG.    ROS   Unable to perform ROS: encephalpathy  improving - AAOX 1 today  denies pain      PEx  Temp:  [98.1 °F (36.7 °C)-98.5 °F (36.9 °C)]   Pulse:  [69-80]   Resp:  [16-18]   BP: (117-151)/(58-86)   SpO2:  [93 %-98 %]     Intake/Output Summary (Last 24 hours) at 1/6/2019 1614  Last data filed at 1/5/2019 2100  Gross per 24 hour   Intake 30 ml   Output --   Net 30 ml     Constitutional: No distress.   Severely frail & thin appearing elderly woman   HENT:   Head: Normocephalic and atraumatic.   Facial muscle waisting    Eyes: No scleral icterus.    Neck: Normal range of motion. Neck supple. No JVD present. No thyromegaly present. NGT insitu   Cardiovascular: Normal rate, regular rhythm and normal heart sounds.   Pulmonary/Chest: Effort normal and breath sounds normal. No stridor. No respiratory distress. She has no wheezes. She has no rales.   Abdominal: Soft. Bowel sounds are normal. She exhibits no distension. There is no tenderness. There is no guarding.   Musculoskeletal: She exhibits no edema, tenderness or deformity.   L. Elbow healing bruise    Neurological: She is alert.   encephalpathy  improving - AAOX 1 today  Skin: Skin is warm and dry. She is not diaphoretic.    Psychiatric: She has a normal mood and affect.     Recent Labs   Lab 01/04/19  0445 01/05/19  0451 01/06/19  0549   WBC 9.90 13.51* 11.62   HGB 8.2* 7.5* 7.2*   HCT 25.0* 23.2* 21.9*    188 164     Recent Labs   Lab 01/04/19  0445 01/05/19  0451 01/06/19  0549    138 139   K 3.9 4.3 3.9    109 110   CO2 26 24 25   BUN 16 14 14   CREATININE 1.9* 1.8* 1.7*   GLU 99 86 83   CALCIUM 10.1 9.6 9.4   MG 1.5* 1.5* 1.4*   PHOS 2.7  2.7 2.7  2.7 2.4*  2.4*     Recent Labs   Lab 12/30/18  1832  01/04/19 0445 01/05/19  0451 01/06/19  0549   ALKPHOS 56  --   --   --   --    ALT 35  --   --   --   --    AST 41*  --   --   --   --    ALBUMIN 2.3*   < > 2.2* 2.1* 1.8*   PROT 5.9*  --   --   --   --    BILITOT 0.3  --   --   --   --     < > = values in this interval not displayed.      Recent Labs   Lab 01/05/19  0009 01/05/19  0620 01/05/19  1208 01/05/19  1723 01/05/19  2309 01/06/19  0452   POCTGLUCOSE 109 87 113* 100 84 90     No results for input(s): CPK, CPKMB, MB, TROPONINI in the last 72 hours.    Scheduled Meds:   amLODIPine  10 mg Oral Daily    calcium-vitamin D3  1 tablet Per NG tube Once    cefTRIAXone (ROCEPHIN) IVPB  1 g Intravenous Q24H    folic acid  1 mg Oral Daily    heparin (porcine)  5,000 Units Subcutaneous Q8H    multivitamin  1 tablet Oral Daily    thiamine  100 mg Oral Daily    traZODone  50 mg Oral QHS    vitamin D  1,000 Units Oral Daily     Continuous Infusions:   dextrose 5 % and 0.45 % NaCl 50 mL/hr at 01/06/19 1402     As Needed:  bisacodyl, sodium chloride 0.9%    Active Hospital Problems    Diagnosis  POA    Palliative care encounter [Z51.5]  Not Applicable    Goals of care, counseling/discussion [Z71.89]  Not Applicable    Insomnia [G47.00]  Yes    Dysphagia [R13.10]  Yes    Electrolyte disturbance [E87.8]  Yes    Caregiver burden [Z63.6]  Not Applicable    Mood disturbance [R45.86]  Yes    Malnutrition [E46]  Yes     Chronic    Essential hypertension [I10]   Yes     Chronic      Resolved Hospital Problems    Diagnosis Date Resolved POA    *Hypernatremia [E87.0] 12/28/2018 Yes    Acute encephalopathy [G93.40] 12/28/2018 No    Stage 4 chronic kidney disease [N18.4] 12/28/2018 Yes     Chronic    ODILIA (acute kidney injury) [N17.9] 12/28/2018 Yes       Overview  Ms. Delgado is an 89yo woman with PMH of CKD, HTN, pulmonary HTN, former smoker, dementia, and Fe deficiency anemia who is brought in by her daughter in law with complaints of 1-2 weeks onset of altered mental status. GI states they need CT abdomen without contrast to rule out abdominal pathology and access anatomy. If negative plan for PEG early next week. POA insistent on PEG tube, even with multiple sources stating it is not warranted.      Assessment and Plan for Problems addressed today:    Hypernatremia  · Na 174 upon admission  · Serial BMPs q6hr  · Suspect 2/2 dehydration from advanced dementia  · Patient down to Na 141on d5w at 75cc/hr    Insomnia  · On trazodone    Dysphagia  · S/P SLP evaluation, likely encephalopathy  · Started clear liquids  · NGT placed 12/23 and started trickle feeds  · NGT feeds advanced as tolerated  · Poor oral intake, advanced to puree diet  · Will consult GI for PEG tube placement    Mood disturbance  · R/O depression post son's demise    Acute encephalopathy   · Improving  · AAOx1 today  · Likely metabolic  · On thiamine, folate and vitamin D for malnutrition   · Dietician consulted  · CT head - no evidence of acute intracranial hemorrhage    Malnutrition  · Dietician consulted  · Low prealbumin    Acute on chronic kidney disease  · Improving with IV hydration    Essential hypertension  · Controlled on amlodipine      Discharge plan and follow up    Provider    I personally scribed for Simeon Kaye MD on 01/06/2019 at 2:10 PM. Electronically signed by sadia Borrero III on 01/06/2019 at 2:10 PM

## 2019-01-07 ENCOUNTER — ANESTHESIA EVENT (OUTPATIENT)
Dept: ENDOSCOPY | Facility: HOSPITAL | Age: 84
DRG: 640 | End: 2019-01-07
Payer: MEDICARE

## 2019-01-07 ENCOUNTER — ANESTHESIA (OUTPATIENT)
Dept: ENDOSCOPY | Facility: HOSPITAL | Age: 84
DRG: 640 | End: 2019-01-07
Payer: MEDICARE

## 2019-01-07 LAB
ALBUMIN SERPL BCP-MCNC: 1.8 G/DL
ANION GAP SERPL CALC-SCNC: 5 MMOL/L
BACTERIA UR CULT: NORMAL
BASOPHILS # BLD AUTO: 0.02 K/UL
BASOPHILS NFR BLD: 0.2 %
BUN SERPL-MCNC: 16 MG/DL
CALCIUM SERPL-MCNC: 9.2 MG/DL
CHLORIDE SERPL-SCNC: 110 MMOL/L
CO2 SERPL-SCNC: 24 MMOL/L
CREAT SERPL-MCNC: 1.8 MG/DL
DIFFERENTIAL METHOD: ABNORMAL
EOSINOPHIL # BLD AUTO: 0.1 K/UL
EOSINOPHIL NFR BLD: 1.5 %
ERYTHROCYTE [DISTWIDTH] IN BLOOD BY AUTOMATED COUNT: 18.1 %
EST. GFR  (AFRICAN AMERICAN): 28.6 ML/MIN/1.73 M^2
EST. GFR  (NON AFRICAN AMERICAN): 24.8 ML/MIN/1.73 M^2
GLUCOSE SERPL-MCNC: 80 MG/DL
HCT VFR BLD AUTO: 21 %
HGB BLD-MCNC: 7 G/DL
IMM GRANULOCYTES # BLD AUTO: 0.03 K/UL
IMM GRANULOCYTES NFR BLD AUTO: 0.4 %
LYMPHOCYTES # BLD AUTO: 1.3 K/UL
LYMPHOCYTES NFR BLD: 16 %
MAGNESIUM SERPL-MCNC: 1.5 MG/DL
MCH RBC QN AUTO: 25.5 PG
MCHC RBC AUTO-ENTMCNC: 33.3 G/DL
MCV RBC AUTO: 77 FL
MONOCYTES # BLD AUTO: 0.6 K/UL
MONOCYTES NFR BLD: 7.4 %
NEUTROPHILS # BLD AUTO: 6.1 K/UL
NEUTROPHILS NFR BLD: 74.5 %
NRBC BLD-RTO: 0 /100 WBC
PHOSPHATE SERPL-MCNC: 2.6 MG/DL
PHOSPHATE SERPL-MCNC: 2.6 MG/DL
PLATELET # BLD AUTO: 176 K/UL
PMV BLD AUTO: 11.2 FL
POCT GLUCOSE: 111 MG/DL (ref 70–110)
POCT GLUCOSE: 81 MG/DL (ref 70–110)
POCT GLUCOSE: 83 MG/DL (ref 70–110)
POCT GLUCOSE: 90 MG/DL (ref 70–110)
POCT GLUCOSE: 91 MG/DL (ref 70–110)
POTASSIUM SERPL-SCNC: 3.7 MMOL/L
RBC # BLD AUTO: 2.74 M/UL
SODIUM SERPL-SCNC: 139 MMOL/L
WBC # BLD AUTO: 8.13 K/UL

## 2019-01-07 PROCEDURE — 97535 SELF CARE MNGMENT TRAINING: CPT

## 2019-01-07 PROCEDURE — 99232 SBSQ HOSP IP/OBS MODERATE 35: CPT | Mod: ,,, | Performed by: INTERNAL MEDICINE

## 2019-01-07 PROCEDURE — 83735 ASSAY OF MAGNESIUM: CPT

## 2019-01-07 PROCEDURE — 36415 COLL VENOUS BLD VENIPUNCTURE: CPT

## 2019-01-07 PROCEDURE — 84100 ASSAY OF PHOSPHORUS: CPT

## 2019-01-07 PROCEDURE — 43246 EGD PLACE GASTROSTOMY TUBE: CPT | Performed by: INTERNAL MEDICINE

## 2019-01-07 PROCEDURE — D9220A PRA ANESTHESIA: Mod: CRNA,,, | Performed by: NURSE ANESTHETIST, CERTIFIED REGISTERED

## 2019-01-07 PROCEDURE — 25000003 PHARM REV CODE 250: Performed by: HOSPITALIST

## 2019-01-07 PROCEDURE — 99232 PR SUBSEQUENT HOSPITAL CARE,LEVL II: ICD-10-PCS | Mod: ,,, | Performed by: INTERNAL MEDICINE

## 2019-01-07 PROCEDURE — 80069 RENAL FUNCTION PANEL: CPT

## 2019-01-07 PROCEDURE — D9220A PRA ANESTHESIA: ICD-10-PCS | Mod: ANES,,, | Performed by: ANESTHESIOLOGY

## 2019-01-07 PROCEDURE — 43246 EGD PLACE GASTROSTOMY TUBE: CPT | Mod: GC,,, | Performed by: INTERNAL MEDICINE

## 2019-01-07 PROCEDURE — 63600175 PHARM REV CODE 636 W HCPCS: Performed by: NURSE ANESTHETIST, CERTIFIED REGISTERED

## 2019-01-07 PROCEDURE — 11000001 HC ACUTE MED/SURG PRIVATE ROOM

## 2019-01-07 PROCEDURE — 25000003 PHARM REV CODE 250: Performed by: INTERNAL MEDICINE

## 2019-01-07 PROCEDURE — 37000008 HC ANESTHESIA 1ST 15 MINUTES: Performed by: INTERNAL MEDICINE

## 2019-01-07 PROCEDURE — 63600175 PHARM REV CODE 636 W HCPCS: Performed by: STUDENT IN AN ORGANIZED HEALTH CARE EDUCATION/TRAINING PROGRAM

## 2019-01-07 PROCEDURE — 25000003 PHARM REV CODE 250: Performed by: NURSE ANESTHETIST, CERTIFIED REGISTERED

## 2019-01-07 PROCEDURE — 85025 COMPLETE CBC W/AUTO DIFF WBC: CPT

## 2019-01-07 PROCEDURE — 37000009 HC ANESTHESIA EA ADD 15 MINS: Performed by: INTERNAL MEDICINE

## 2019-01-07 PROCEDURE — D9220A PRA ANESTHESIA: ICD-10-PCS | Mod: CRNA,,, | Performed by: NURSE ANESTHETIST, CERTIFIED REGISTERED

## 2019-01-07 PROCEDURE — 63600175 PHARM REV CODE 636 W HCPCS: Performed by: PHYSICIAN ASSISTANT

## 2019-01-07 PROCEDURE — 25000003 PHARM REV CODE 250: Performed by: STUDENT IN AN ORGANIZED HEALTH CARE EDUCATION/TRAINING PROGRAM

## 2019-01-07 PROCEDURE — 97116 GAIT TRAINING THERAPY: CPT

## 2019-01-07 PROCEDURE — S5010 5% DEXTROSE AND 0.45% SALINE: HCPCS | Performed by: INTERNAL MEDICINE

## 2019-01-07 PROCEDURE — 43246 PR EGD, FLEX, W/PLCMT, GASTROSTOMY TUBE: ICD-10-PCS | Mod: GC,,, | Performed by: INTERNAL MEDICINE

## 2019-01-07 PROCEDURE — D9220A PRA ANESTHESIA: Mod: ANES,,, | Performed by: ANESTHESIOLOGY

## 2019-01-07 RX ORDER — PROPOFOL 10 MG/ML
VIAL (ML) INTRAVENOUS CONTINUOUS PRN
Status: DISCONTINUED | OUTPATIENT
Start: 2019-01-07 | End: 2019-01-07

## 2019-01-07 RX ORDER — FENTANYL CITRATE 50 UG/ML
INJECTION, SOLUTION INTRAMUSCULAR; INTRAVENOUS
Status: DISCONTINUED | OUTPATIENT
Start: 2019-01-07 | End: 2019-01-07

## 2019-01-07 RX ORDER — SODIUM CHLORIDE 9 MG/ML
INJECTION, SOLUTION INTRAVENOUS CONTINUOUS PRN
Status: DISCONTINUED | OUTPATIENT
Start: 2019-01-07 | End: 2019-01-07

## 2019-01-07 RX ORDER — PROPOFOL 10 MG/ML
VIAL (ML) INTRAVENOUS
Status: DISCONTINUED | OUTPATIENT
Start: 2019-01-07 | End: 2019-01-07

## 2019-01-07 RX ADMIN — DEXTROSE AND SODIUM CHLORIDE: 5; .45 INJECTION, SOLUTION INTRAVENOUS at 09:01

## 2019-01-07 RX ADMIN — DEXTROSE AND SODIUM CHLORIDE: 5; .45 INJECTION, SOLUTION INTRAVENOUS at 07:01

## 2019-01-07 RX ADMIN — PROPOFOL 10 MG: 10 INJECTION, EMULSION INTRAVENOUS at 04:01

## 2019-01-07 RX ADMIN — HEPARIN SODIUM 5000 UNITS: 5000 INJECTION, SOLUTION INTRAVENOUS; SUBCUTANEOUS at 10:01

## 2019-01-07 RX ADMIN — PROPOFOL 40 MG: 10 INJECTION, EMULSION INTRAVENOUS at 04:01

## 2019-01-07 RX ADMIN — THERA TABS 1 TABLET: TAB at 09:01

## 2019-01-07 RX ADMIN — FOLIC ACID 1 MG: 1 TABLET ORAL at 09:01

## 2019-01-07 RX ADMIN — PROPOFOL 35 MCG/KG/MIN: 10 INJECTION, EMULSION INTRAVENOUS at 04:01

## 2019-01-07 RX ADMIN — TRAZODONE HYDROCHLORIDE 50 MG: 50 TABLET ORAL at 10:01

## 2019-01-07 RX ADMIN — CEFTRIAXONE SODIUM 1 G: 1 INJECTION, POWDER, FOR SOLUTION INTRAMUSCULAR; INTRAVENOUS at 10:01

## 2019-01-07 RX ADMIN — AMLODIPINE BESYLATE 10 MG: 10 TABLET ORAL at 09:01

## 2019-01-07 RX ADMIN — FENTANYL CITRATE 12.5 MCG: 50 INJECTION, SOLUTION INTRAMUSCULAR; INTRAVENOUS at 04:01

## 2019-01-07 RX ADMIN — SODIUM CHLORIDE: 0.9 INJECTION, SOLUTION INTRAVENOUS at 04:01

## 2019-01-07 RX ADMIN — Medication 100 MG: at 09:01

## 2019-01-07 RX ADMIN — VITAMIN D, TAB 1000IU (100/BT) 1000 UNITS: 25 TAB at 09:01

## 2019-01-07 NOTE — [employer]OVATION PATIENT INSTRUCTIONS
Discharge Summary/Instructions after an Endoscopic Procedure  Patient Name: Cesilia Delgado  Patient MRN: [id number]  Patient YOB: 1930 Monday, January 07, 2019  Eitan Coles MD  RESTRICTIONS:  During your procedure today, you received medications for sedation.  These   medications may affect your judgment, balance and coordination.  Therefore,   for 24 hours, you have the following restrictions:   - DO NOT drive a car, operate machinery, make legal/financial decisions,   sign important papers or drink alcohol.    ACTIVITY:  Today: no heavy lifting, straining or running due to procedural   sedation/anesthesia.  The following day: return to full activity including work.  DIET:  Eat and drink normally unless instructed otherwise.     TREATMENT FOR COMMON SIDE EFFECTS:  - Mild abdominal pain, nausea, belching, bloating or excessive gas:  rest,   eat lightly and use a heating pad.  - Sore Throat: treat with throat lozenges and/or gargle with warm salt   water.  - Because air was used during the procedure, expelling large amounts of air   from your rectum or belching is normal.  - If a bowel prep was taken, you may not have a bowel movement for 1-3 days.    This is normal.  SYMPTOMS TO WATCH FOR AND REPORT TO YOUR PHYSICIAN:  1. Abdominal pain or bloating, other than gas cramps.  2. Chest pain.  3. Back pain.  4. Signs of infection such as: chills or fever occurring within 24 hours   after the procedure.  5. Rectal bleeding, which would show as bright red, maroon, or black stools.   (A tablespoon of blood from the rectum is not serious, especially if   hemorrhoids are present.)  6. Vomiting.  7. Weakness or dizziness.  GO DIRECTLY TO THE NEAREST EMERGENCY ROOM IF YOU HAVE ANY OF THE FOLLOWING:      Difficulty breathing              Chills and/or fever over 101 F   Persistent vomiting and/or vomiting blood   Severe abdominal pain   Severe chest pain   Black, tarry stools   Bleeding- more than one  tablespoon   Any other symptom or condition that you feel may need urgent attention  Your doctor recommends these additional instructions:  If any biopsies were taken, your doctors clinic will contact you in 1 to 2   weeks with any results.  - Return patient to [facility] for ongoing care.   - Please follow the post-PEG recommendations including: external bolster 1   cm from abdominal wall, NPO x4 hrs then water today and may use PEG   tomorrow for feedings.   For questions, problems or results please call your physician - Eitan Coles MD at Work:  [phone].  [facility], EMERGENCY ROOM PHONE NUMBER: [phone]  IF A COMPLICATION OR EMERGENCY SITUATION ARISES AND YOU ARE UNABLE TO REACH   YOUR PHYSICIAN - GO DIRECTLY TO THE EMERGENCY ROOM.  Eitan Coles MD  1/7/2019 5:17:35 PM  This report has been verified and signed electronically.  [employer]

## 2019-01-07 NOTE — TRANSFER OF CARE
"Anesthesia Transfer of Care Note    Patient: Cesilia Delgado    Procedure(s) Performed: Procedure(s) (LRB):  EGD (ESOPHAGOGASTRODUODENOSCOPY) (N/A)  INSERTION, PEG TUBE (N/A)    Patient location: [facility]    Anesthesia Type: general    Transport from OR: Transported from OR on 2-3 L/min O2 by NC with adequate spontaneous ventilation    Post pain: adequate analgesia    Post assessment: no apparent anesthetic complications    Post vital signs: stable    Level of consciousness: awake    Nausea/Vomiting: no nausea/vomiting    Complications: none    Transfer of care protocol was followed      Last vitals:   Visit Vitals  /62   Pulse (!) 57   Temp 36.2 °C (97.2 °F) (Temporal)   Resp 16   Ht 5' 2" (1.575 m)   Wt 34.5 kg (76 lb)   SpO2 100%   Breastfeeding? No   BMI 13.90 kg/m²     "

## 2019-01-07 NOTE — PT/OT/SLP PROGRESS
Occupational Therapy   Treatment    Name: Cesilia Delgado  MRN: [id number]  Admitting Diagnosis:  Hypernatremia  Day of Surgery    Recommendations:     Discharge Recommendations: nursing facility, skilled  Discharge Equipment Recommendations:  hospital bed, lift device, wheelchair, manual    Subjective   Pt with no complaints & pleasantly confused throughout session.  Pain/Comfort:  · Pain Rating 1: 0/10  · Pain Rating Post-Intervention 1: 0/10    Objective:     Communicated with: RN prior to session.  Patient found supine in bed with no family present and telemetry upon OT entry to room.    General Precautions: Standard, fall, NPO     Occupational Performance:    Bed Mobility:    · Patient completed Rolling/Turning to Right with moderate assistance  · Patient completed Scooting/Bridging with moderate assistance scooting forward on EOB  · Patient completed Supine to Sit with moderate assistance  · Patient completed Sit to Supine with moderate assistance     Functional Mobility/Transfers:  · Patient completed Sit <> Stand Transfer with moderate assistance  with  no assistive device   · Functional Mobility: Pt took ~ 4 steps forward & backward & to the right with Max (A).    Activities of Daily Living:  · Upper Body Dressing: maximal assistance with donning gown around back while seated EOB  · Lower Body Dressing: total assistance donning socks while seated EOB      [facility] 6 Click ADL: 8    Treatment & Education:  Pt required SBA-Min (A) for postural control while seated EOB.  Provided verbal & physical cues to facilitate postural control.  Provided daily orientation due to pt confusion.  Provided education regarding postural control, transfers, safety, ADL's, & bed mobility.  Pt had no further questions & when asked whether there were any concerns pt reported none.    Patient left supine with all lines intact, call button in reach, bed alarm on, RN notified and white board updated.  Education:    Assessment:     Cesilia MANN  Sandy is a 88 y.o. female with a medical diagnosis of Hypernatremia.  She presents with the following performance deficits affecting function are weakness, impaired endurance, impaired self care skills, impaired functional mobilty, decreased coordination, impaired cognition, impaired balance, decreased upper extremity function, decreased lower extremity function, decreased safety awareness.  Pt with fair participation and motivation.  Pt required hand over hand (A) with most activities due to cognitive deficits & easily distracted.  Pt is at high fall risk.    Rehab Prognosis:  Fair; patient would benefit from acute skilled OT services to address these deficits and reach maximum level of function.       Plan:     Patient to be seen 3 x/week to address the above listed problems via self-care/home management, therapeutic activities, therapeutic exercises  · Plan of Care Expires: 01/25/19  · Plan of Care Reviewed with: patient    This Plan of care has been discussed with the patient who was involved in its development and understands and is in agreement with the identified goals and treatment plan    GOALS:   Multidisciplinary Problems     Occupational Therapy Goals        Problem: Occupational Therapy Goal    Goal Priority Disciplines Outcome Interventions   Occupational Therapy Goal     OT, PT/OT Ongoing (interventions implemented as appropriate)    Description:  Goals to be met by: 1/15     Patient will increase functional independence with ADLs by performing:    UE Dressing with Set-up Assistance.  LE Dressing with Contact Guard Assistance.  Grooming while seated with Set-up Assistance.  Toileting from toilet with Contact Guard Assistance for hygiene and clothing management.   Transfers with Minimal assistance.  Supine to sit with contact guard assistance.                         Time Tracking:     OT Date of Treatment: 01/07/19  OT Start Time: 1352  OT Stop Time: 1410  OT Total Time (min): 18 min    Billable  Minutes:Self Care/Home Management 18    SANJU Mason  1/7/2019

## 2019-01-07 NOTE — PLAN OF CARE
KYLER called [facility] and spoke to Stevie in admissions ([phone]). KYLRE informed stevie of the pt's status and anticipated d/c date. KYLER will send updated clinicals. KYLER will continue to follow-up.     Joseph Mosqueda, LMSW [facility]  [id number]

## 2019-01-07 NOTE — PLAN OF CARE
Problem: Adult Inpatient Plan of Care  Goal: Plan of Care Review  Pt free of falls and injury.   Disoriented, calm,  turned and repositioned with wedge, perineal care provided, sacral foam protective dressing applied, heel boots on, thermal blanket applied,antibiotic IV push  given , blood glucose WNL,bed low, breaks locked, SR up x2, call light within reach, will continue to monitor.

## 2019-01-07 NOTE — ANESTHESIA POSTPROCEDURE EVALUATION
"Anesthesia Post Evaluation    Patient: Cesilia Delgado    Procedure(s) Performed: Procedure(s) (LRB):  EGD (ESOPHAGOGASTRODUODENOSCOPY) (N/A)  INSERTION, PEG TUBE (N/A)    Final Anesthesia Type: general  Patient location during evaluation: PACU  Patient participation: Yes- Able to Participate  Level of consciousness: awake and alert  Post-procedure vital signs: reviewed and stable  Pain management: adequate  Airway patency: patent  PONV status at discharge: No PONV  Anesthetic complications: no      Cardiovascular status: blood pressure returned to baseline and hemodynamically stable  Respiratory status: unassisted, spontaneous ventilation and room air  Hydration status: euvolemic  Follow-up not needed.        Visit Vitals  BP (!) 140/65   Pulse 60   Temp 36.2 °C (97.2 °F) (Temporal)   Resp 16   Ht 5' 2" (1.575 m)   Wt 34.5 kg (76 lb)   SpO2 100%   Breastfeeding? No   BMI 13.90 kg/m²       Pain/Rose Marie Score: No Data Recorded      "

## 2019-01-07 NOTE — PLAN OF CARE
Problem: Physical Therapy Goal  Goal: Physical Therapy Goal  Goals to be met by: 2019      Patient will increase functional independence with mobility by performin. Supine to sit with Stand-by Assistance  2. Sit to supine with Stand-by Assistance  3. Sit to stand transfer with Minimum assistance   4. Bed to chair transfer with Minimal Assistance using LRAD or no AD   5. Gait  x 10 feet with Minimal Assistance using LRAD or no AD.        Discharge Recommendations: Long term SNF    Pt safe to transfer OOB/BTB with RN/PCT via SPT: Use no AD with max A of 1 person.    Goals remain appropriate.     Deya Francisco, PTA.   [phone]   2019

## 2019-01-07 NOTE — PT/OT/SLP PROGRESS
Speech Language Pathology      Cesilia Delgado  MRN: [id number]    Patient not seen today secondary to IVAN for PEG placement in AM. SLP will f/u per POC.     PRABHJOT Barth-SLP  Speech-Language Pathology  Pager: [phone]

## 2019-01-07 NOTE — ANESTHESIA RELEASE NOTE
"Anesthesia Release from PACU Note    Patient: Cesilia Delgado    Procedure(s) Performed: Procedure(s) (LRB):  EGD (ESOPHAGOGASTRODUODENOSCOPY) (N/A)  INSERTION, PEG TUBE (N/A)    Anesthesia type: general    Post pain: Adequate analgesia    Post assessment: no apparent anesthetic complications    Last Vitals:   Visit Vitals  BP (!) 140/65   Pulse 60   Temp 36.2 °C (97.2 °F) (Temporal)   Resp 16   Ht 5' 2" (1.575 m)   Wt 34.5 kg (76 lb)   SpO2 100%   Breastfeeding? No   BMI 13.90 kg/m²       Post vital signs: stable    Level of consciousness: awake    Nausea/Vomiting: no nausea/no vomiting    Complications: none    Airway Patency: patent    Respiratory: unassisted    Cardiovascular: stable and blood pressure at baseline    Hydration: euvolemic  "

## 2019-01-07 NOTE — ANESTHESIA PREPROCEDURE EVALUATION
01/07/2019  Cesilia Delgado is a 88 y.o., female.    Pre-op Assessment    I have reviewed the Patient Summary Reports.     I have reviewed the Nursing Notes.   I have reviewed the Medications.     Review of Systems  Anesthesia Hx:  No problems with previous Anesthesia  History of prior surgery of interest to airway management or planning: Denies Family Hx of Anesthesia complications.   Denies Personal Hx of Anesthesia complications.   Cardiovascular:   Hypertension ECG has been reviewed.   12/31/2018 TTE:  · Normal left ventricular systolic function. The estimated ejection fraction is 63%  · No wall motion abnormalities.  · Indeterminate left ventricular diastolic function.  · Normal right ventricular systolic function.  · Normal central venous pressure (3 mm Hg).  · Pulmonary hypertension present.  · Trivial AR on limited Doppler       CONCLUSIONS (2016)    1 - Biatrial enlargement.     2 - Concentric hypertrophy.     3 - Normal left ventricular systolic function (EF 55-60%).     4 - Normal right ventricular systolic function .     5 - Pulmonary hypertension. The estimated PA systolic pressure is 56 mmHg.     6 - Trivial to mild aortic regurgitation.     7 - Mild mitral regurgitation.     8 - Moderate tricuspid regurgitation.     9 - Grade 1 diastolic dysfunction .    Pulmonary:   Asthma    Renal/:   Chronic Renal Disease, CRI    Hepatic/GI:   Rectal bleeding   Neurological:  Neurology Normal    Endocrine:  Endocrine Normal    Psych:   Dementia         Physical Exam  General:  Cachexia, Malnutrition    Airway/Jaw/Neck:  Airway Findings: Mouth Opening: Normal Tongue: Normal  General Airway Assessment: Adult  Mallampati: II  TM Distance: Normal, at least 6 cm       Chest/Lungs:  Chest/Lungs Findings: Clear to auscultation, Normal Respiratory Rate     Heart/Vascular:  Heart Findings: Rate: Normal  Rhythm:  Regular Rhythm        Mental Status:  Mental Status Findings:  Cooperative         Anesthesia Plan  Type of Anesthesia, risks & benefits discussed:  Anesthesia Type:  general  Patient's Preference:   Intra-op Monitoring Plan: standard ASA monitors  Intra-op Monitoring Plan Comments:   Post Op Pain Control Plan: per primary service following discharge from PACU  Post Op Pain Control Plan Comments:   Induction:   IV  Beta Blocker:  Patient is not currently on a Beta-Blocker (No further documentation required).       Informed Consent: Patient representative understands risks and agrees with Anesthesia plan.  Questions answered. Anesthesia consent signed with patient representative.  ASA Score: 3     Day of Surgery Review of History & Physical:    H&P update referred to the provider.         Ready For Surgery From Anesthesia Perspective.

## 2019-01-07 NOTE — PROGRESS NOTES
Hospital Medicine  Progress note    I personally performed the history, physical exam and medical decision making: and confirmed the accuracy of the information in the transcribed note.  Simeon Kaye M.D.  Pager: [phone]  Cell Phone: [phone]    Team: [facility] Simeon Kaye MD  Admit Date: 12/22/2018  IESHA 1/9/2019  Code status: Full Code    Principal Problem:  Hypernatremia    Interval hx: Gastroenterology reviewed CT and they saw no concerning findings, they will place PEG tube today.     ROS   Unable to perform ROS: encephalpathy  improving - AAOX 1 today  denies pain      PEx  Temp:  [97.2 °F (36.2 °C)-98.7 °F (37.1 °C)]   Pulse:  [64-87]   Resp:  [12-22]   BP: (111-129)/()   SpO2:  [94 %-99 %]     Intake/Output Summary (Last 24 hours) at 1/7/2019 1558  Last data filed at 1/7/2019 0927  Gross per 24 hour   Intake 30 ml   Output --   Net 30 ml     Constitutional: No distress.   Severely frail & thin appearing elderly woman   HENT:   Head: Normocephalic and atraumatic.   Facial muscle waisting    Eyes: No scleral icterus.    Neck: Normal range of motion. Neck supple. No JVD present. No thyromegaly present. NGT insitu   Cardiovascular: Normal rate, regular rhythm and normal heart sounds.   Pulmonary/Chest: Effort normal and breath sounds normal. No stridor. No respiratory distress. She has no wheezes. She has no rales.   Abdominal: Soft. Bowel sounds are normal. She exhibits no distension. There is no tenderness. There is no guarding.   Musculoskeletal: She exhibits no edema, tenderness or deformity.   L. Elbow healing bruise    Neurological: She is alert.   encephalpathy  improving - AAOX 1 today  Skin: Skin is warm and dry. She is not diaphoretic.   Psychiatric: She has a normal mood and affect.     Recent Labs   Lab 01/05/19  0451 01/06/19  0549 01/07/19  0555   WBC 13.51* 11.62 8.13   HGB 7.5* 7.2* 7.0*   HCT 23.2* 21.9* 21.0*    164 176     Recent Labs   Lab 01/05/19  0412  01/06/19  0549 01/07/19  0555    139 139   K 4.3 3.9 3.7    110 110   CO2 24 25 24   BUN 14 14 16   CREATININE 1.8* 1.7* 1.8*   GLU 86 83 80   CALCIUM 9.6 9.4 9.2   MG 1.5* 1.4* 1.5*   PHOS 2.7  2.7 2.4*  2.4* 2.6*  2.6*     Recent Labs   Lab 01/05/19  0451 01/06/19  0549 01/07/19  0555   ALBUMIN 2.1* 1.8* 1.8*      Recent Labs   Lab 01/06/19  0452 01/06/19  1629 01/07/19  0049 01/07/19  0502 01/07/19  1003 01/07/19  1138   POCTGLUCOSE 90 115* 81 83 111* 91     No results for input(s): CPK, CPKMB, MB, TROPONINI in the last 72 hours.    Scheduled Meds:   amLODIPine  10 mg Oral Daily    calcium-vitamin D3  1 tablet Per NG tube Once    cefTRIAXone (ROCEPHIN) IVPB  1 g Intravenous Q24H    folic acid  1 mg Oral Daily    heparin (porcine)  5,000 Units Subcutaneous Q8H    multivitamin  1 tablet Oral Daily    thiamine  100 mg Oral Daily    traZODone  50 mg Oral QHS    vitamin D  1,000 Units Oral Daily     Continuous Infusions:   dextrose 5 % and 0.45 % NaCl 50 mL/hr at 01/07/19 0927     As Needed:  bisacodyl, sodium chloride 0.9%    Active Hospital Problems    Diagnosis  POA    Palliative care encounter [Z51.5]  Not Applicable    Goals of care, counseling/discussion [Z71.89]  Not Applicable    Insomnia [G47.00]  Yes    Dysphagia [R13.10]  Yes    Electrolyte disturbance [E87.8]  Yes    Caregiver burden [Z63.6]  Not Applicable    Mood disturbance [R45.86]  Yes    Malnutrition [E46]  Yes     Chronic    Essential hypertension [I10]  Yes     Chronic      Resolved Hospital Problems    Diagnosis Date Resolved POA    *Hypernatremia [E87.0] 12/28/2018 Yes    Acute encephalopathy [G93.40] 12/28/2018 No    Stage 4 chronic kidney disease [N18.4] 12/28/2018 Yes     Chronic    ODILIA (acute kidney injury) [N17.9] 12/28/2018 Yes       Overview  Ms. Sandy is an 89yo woman with PMH of CKD, HTN, pulmonary HTN, former smoker, dementia, and Fe deficiency anemia who is brought in by her daughter in law with  complaints of 1-2 weeks onset of altered mental status. GI states they need CT abdomen without contrast to rule out abdominal pathology and access anatomy. If negative plan for PEG early next week. POA insistent on PEG tube, even with multiple sources stating it is not warranted.  CT abdomen shows increased stool burden throughout the colon with large rectal fecal ball. Will await GI scheduling of PEG. Gastroenterology reviewed CT and they saw no concerning findings, they will place PEG tube today (1/7).         Assessment and Plan for Problems addressed today:    Hypernatremia  · Na 174 upon admission  · Serial BMPs q6hr  · Suspect 2/2 dehydration from advanced dementia  · Patient down to Na 141on d5w at 75cc/hr    Insomnia  · On trazodone    Dysphagia  · S/P SLP evaluation, likely encephalopathy  · Started clear liquids  · NGT placed 12/23 and started trickle feeds  · NGT feeds advanced as tolerated  · Poor oral intake, advanced to puree diet  · Will consult GI for PEG tube placement    Mood disturbance  · R/O depression post son's demise    Acute encephalopathy   · Improving  · AAOx1 today  · Likely metabolic  · On thiamine, folate and vitamin D for malnutrition   · Dietician consulted  · CT head - no evidence of acute intracranial hemorrhage    Malnutrition  · Dietician consulted  · Low prealbumin    Acute on chronic kidney disease  · Improving with IV hydration    Essential hypertension  · Controlled on amlodipine      Discharge plan and follow up    Provider    I personally scribed for Simeon Kaye MD on 01/07/2019 at 1:59 PM. Electronically signed by sadia Borrero III on 01/07/2019 at 1:59 PM

## 2019-01-07 NOTE — MEDICAL/APP STUDENT
Hospital Medicine  Progress note    Team: [facility] Derrek Borrero  Admit Date: 12/22/2018  [facility] 1/9/2019  Code status: Full Code    Principal Problem:  Hypernatremia    Interval hx: Gastroenterology reviewed CT and they saw no concerning findings, they will place PEG tube today.     ROS   Unable to perform ROS: encephalpathy  improving - AAOX 1 today  denies pain      PEx  Temp:  [98.5 °F (36.9 °C)]   Pulse:  [64-87]   Resp:  [12-22]   BP: (111-127)/()   SpO2:  [95 %-98 %]     Intake/Output Summary (Last 24 hours) at 1/7/2019 0755  Last data filed at 1/6/2019 2134  Gross per 24 hour   Intake 30 ml   Output --   Net 30 ml     Constitutional: No distress.   Severely frail & thin appearing elderly woman   HENT:   Head: Normocephalic and atraumatic.   Facial muscle waisting    Eyes: No scleral icterus.    Neck: Normal range of motion. Neck supple. No JVD present. No thyromegaly present. NGT insitu   Cardiovascular: Normal rate, regular rhythm and normal heart sounds.   Pulmonary/Chest: Effort normal and breath sounds normal. No stridor. No respiratory distress. She has no wheezes. She has no rales.   Abdominal: Soft. Bowel sounds are normal. She exhibits no distension. There is no tenderness. There is no guarding.   Musculoskeletal: She exhibits no edema, tenderness or deformity.   L. Elbow healing bruise    Neurological: She is alert.   encephalpathy  improving - AAOX 1 today  Skin: Skin is warm and dry. She is not diaphoretic.   Psychiatric: She has a normal mood and affect.     Recent Labs   Lab 01/05/19  0451 01/06/19  0549 01/07/19  0555   WBC 13.51* 11.62 8.13   HGB 7.5* 7.2* 7.0*   HCT 23.2* 21.9* 21.0*    164 176     Recent Labs   Lab 01/05/19  0451 01/06/19  0549 01/07/19  0555    139 139   K 4.3 3.9 3.7    110 110   CO2 24 25 24   BUN 14 14 16   CREATININE 1.8* 1.7* 1.8*   GLU 86 83 80   CALCIUM 9.6 9.4 9.2   MG 1.5* 1.4* 1.5*   PHOS 2.7  2.7 2.4*  2.4* 2.6*  2.6*     Recent  Labs   Lab 01/05/19  0451 01/06/19  0549 01/07/19  0555   ALBUMIN 2.1* 1.8* 1.8*      Recent Labs   Lab 01/05/19  1723 01/05/19  2309 01/06/19  0452 01/06/19  1629 01/07/19  0049 01/07/19  0502   POCTGLUCOSE 100 84 90 115* 81 83     No results for input(s): CPK, CPKMB, MB, TROPONINI in the last 72 hours.    Scheduled Meds:   amLODIPine  10 mg Oral Daily    calcium-vitamin D3  1 tablet Per NG tube Once    cefTRIAXone (ROCEPHIN) IVPB  1 g Intravenous Q24H    folic acid  1 mg Oral Daily    heparin (porcine)  5,000 Units Subcutaneous Q8H    multivitamin  1 tablet Oral Daily    thiamine  100 mg Oral Daily    traZODone  50 mg Oral QHS    vitamin D  1,000 Units Oral Daily     Continuous Infusions:   dextrose 5 % and 0.45 % NaCl 50 mL/hr at 01/06/19 1402     As Needed:  bisacodyl, sodium chloride 0.9%    Active Hospital Problems    Diagnosis  POA    Palliative care encounter [Z51.5]  Not Applicable    Goals of care, counseling/discussion [Z71.89]  Not Applicable    Insomnia [G47.00]  Yes    Dysphagia [R13.10]  Yes    Electrolyte disturbance [E87.8]  Yes    Caregiver burden [Z63.6]  Not Applicable    Mood disturbance [R45.86]  Yes    Malnutrition [E46]  Yes     Chronic    Essential hypertension [I10]  Yes     Chronic      Resolved Hospital Problems    Diagnosis Date Resolved POA    *Hypernatremia [E87.0] 12/28/2018 Yes    Acute encephalopathy [G93.40] 12/28/2018 No    Stage 4 chronic kidney disease [N18.4] 12/28/2018 Yes     Chronic    ODILIA (acute kidney injury) [N17.9] 12/28/2018 Yes       Overview  Ms. Delgado is an 87yo woman with PMH of CKD, HTN, pulmonary HTN, former smoker, dementia, and Fe deficiency anemia who is brought in by her daughter in law with complaints of 1-2 weeks onset of altered mental status. GI states they need CT abdomen without contrast to rule out abdominal pathology and access anatomy. If negative plan for PEG early next week. POA insistent on PEG tube, even with multiple  sources stating it is not warranted.  CT abdomen shows increased stool burden throughout the colon with large rectal fecal ball. Will await GI scheduling of PEG. Gastroenterology reviewed CT and they saw no concerning findings, they will place PEG tube today (1/7).         Assessment and Plan for Problems addressed today:    Hypernatremia  · Na 174 upon admission  · Serial BMPs q6hr  · Suspect 2/2 dehydration from advanced dementia  · Patient down to Na 141on d5w at 75cc/hr    Insomnia  · On trazodone    Dysphagia  · S/P SLP evaluation, likely encephalopathy  · Started clear liquids  · NGT placed 12/23 and started trickle feeds  · NGT feeds advanced as tolerated  · Poor oral intake, advanced to puree diet  · Will consult GI for PEG tube placement    Mood disturbance  · R/O depression post son's demise    Acute encephalopathy   · Improving  · AAOx1 today  · Likely metabolic  · On thiamine, folate and vitamin D for malnutrition   · Dietician consulted  · CT head - no evidence of acute intracranial hemorrhage    Malnutrition  · Dietician consulted  · Low prealbumin    Acute on chronic kidney disease  · Improving with IV hydration    Essential hypertension  · Controlled on amlodipine      Discharge plan and follow up    Provider    I personally scribed for Simeon Kaye MD on 01/07/2019 at 1:59 PM. Electronically signed by sadia Borrero III on 01/07/2019 at 1:59 PM

## 2019-01-07 NOTE — PLAN OF CARE
Problem: Occupational Therapy Goal  Goal: Occupational Therapy Goal  Goals to be met by: 1/15     Patient will increase functional independence with ADLs by performing:    UE Dressing with Set-up Assistance.  LE Dressing with Contact Guard Assistance.  Grooming while seated with Set-up Assistance.  Toileting from toilet with Contact Guard Assistance for hygiene and clothing management.   Transfers with Minimal assistance.  Supine to sit with contact guard assistance.       Outcome: Ongoing (interventions implemented as appropriate)  Goals updated.

## 2019-01-07 NOTE — PT/OT/SLP PROGRESS
Physical Therapy Treatment    Patient Name:  Cesilia Delgado   MRN:  [id number]  Admitting Diagnosis: Hypernatremia  Recent Surgery: Procedure(s) (LRB):  EGD (ESOPHAGOGASTRODUODENOSCOPY) (N/A)  INSERTION, PEG TUBE (N/A) Day of Surgery    Recommendations:     Discharge Recommendations:  nursing facility, skilled(long term)   Discharge Equipment Recommendations: hospital bed, wheelchair, manual   Barriers to discharge: Inaccessible home and Decreased caregiver support  Pt requiring increased assistance at current time.     Plan:     During this hospitalization, patient to be seen 3 x/week to address the above listed problems via gait training, therapeutic activities, therapeutic exercises, neuromuscular re-education  · Plan of Care Expires:  01/25/19   Plan of Care Reviewed with: patient    This Plan of care has been discussed with the patient who was involved in its development and understands and is in agreement with the identified goals and treatment plan    Subjective     Communicated with RN (Stephan) prior to session.     Patient comments: Pt initially refusing, however, agreeable after encouragement  Pain/Comfort:  · Pain Rating 1: 0/10  · Pain Rating Post-Intervention 1: 0/10    Objective:     2 attempts for tx session 2* Pt IVAN away for EGD at 9:49 am    Patient found with: bed alarm, peripheral IV, telemetry(waffle overlay)    Patient found sup in bed upon PT entry to room, agreeable to treatment.  NO family present in the room.    General Precautions: Standard, Cardiac aspiration, fall, NPO   Orthopedic Precautions:N/A   Braces: N/A       BED MOBILITY (vc's for hand placement sequencing of task):        Rolling to the R:  with max A with no use of bedrail       Rolling to the L:  with max A with no use of bedrail        Sup > sit at the EOB:  Max A exiting on the L side        Sit > sup:  Max A for trunk and legs                SITTING AT THE EDGE OF THE BED    Assistance Level Required: initially with mod A then  min A for trunk with B UE support     Postural deviations noted: flexed trunk, rounded shoulders, forward head   Encouraged: upright posture        TRANSFERS  (vc's for hand placement, sequencing of task and safety)   Patient completed Sit <> Stand Transfer from EOB with max A for hip elevation and balance with rolling walker x1 trial(s)   Patient completed Stand <> Sit Transfer to EOB with max A for controlled descent with rolling walker      GAIT: within room, tech manages IV pole   Patient ambulated: 18ft   Patient required: max A for balance and mgt of AD   Patient used:  Rolling Walker   Gait Pattern observed: step to   Gait Deviation(s): decreased unruly, increased time in double stance, decreased velocity of limb motion, decreased step length, decreased stride length, decreased swing-to-stance ratio, decreased toe-to-floor clearance, decreased weight-shifting ability and narrow DEMIAN, FFP    Comments: vc's and tc's for upright posture, directional guidance, B step length, placement of AD, DEMIAN    EDUCATION  Patient provided with daily orientation and goals of this PT session. They were educated to call for assistance and to transfer with hospital staff only.  Also, pt was educated on the effects of prolonged immobility and the importance of performing OOB activity to promote healing and reduce recovery time    Whiteboard updated with correct mobility information. RN/PCT notified.  Pt safe to transfer OOB/BTB with RN/PCT via SPT: Use no AD with max A of 1 person.    Patient left supine, with  all lines intact, call button in reach, bed alarm on and RN notified    AM-PAC 6 CLICK MOBILITY  Turning over in bed (including adjusting bedclothes, sheets and blankets)?: 2  Sitting down on and standing up from a chair with arms (e.g., wheelchair, bedside commode, etc.): 2  Moving from lying on back to sitting on the side of the bed?: 2  Moving to and from a bed to a chair (including a wheelchair)?: 2  Need to walk in  hospital room?: 2  Climbing 3-5 steps with a railing?: 1  Basic Mobility Total Score: 11     Assessment:     Cesilia Delgado is a 88 y.o. female admitted with a medical diagnosis of Hypernatremia.  She presents with the following impairments/functional limitations:  weakness, impaired endurance, impaired self care skills, impaired functional mobilty, gait instability, impaired balance, impaired cognition, decreased coordination, decreased upper extremity function, decreased lower extremity function, decreased safety awareness, decreased ROM, impaired coordination, impaired fine motor. requiring significant assistance and verbal cues for bed mob, sitting at the EOB, transfer, standing and gait to prevent falls during OOB mobility 2* weakness, post COG, FFP.   In light of pt's current functional level and deficits, it is anticipated that pt will need to participate in an intense rehab program consisting of PT and OT in order to achieve full rehab potential to return to previous level of function and roles.  Pt will cont to benefit from skilled PT intervention to address deficits and improve functional mobility.     Rehab Prognosis:  Fair; patient would benefit from acute skilled PT services to address these deficits and reach maximum level of function.      GOALS:   Multidisciplinary Problems     Physical Therapy Goals        Problem: Physical Therapy Goal    Goal Priority Disciplines Outcome Goal Variances Interventions   Physical Therapy Goal     PT, PT/OT Ongoing (interventions implemented as appropriate)     Description:  Goals to be met by: 2019      Patient will increase functional independence with mobility by performin. Supine to sit with Stand-by Assistance  2. Sit to supine with Stand-by Assistance  3. Sit to stand transfer with Minimum assistance   4. Bed to chair transfer with Minimal Assistance using LRAD or no AD   5. Gait  x 10 feet with Minimal Assistance using LRAD or no AD.                        Time Tracking:     PT Received On: 01/07/19  PT Start Time: 1209     PT Stop Time: 1228  PT Total Time (min): 19 min     Billable Minutes: Gait Training 19    Treatment Type: Treatment  PT/PTA: PTA     PTA Visit Number: 1       Deya Francisco PTA.  Pager [phone]    1/7/2019    .

## 2019-01-07 NOTE — TREATMENT PLAN
GI Treatment Plan    20 F PEG tube placed without any immediate complications.   External bumper at 2.5 cm nichole.     Please follow the post-PEG recommendations including:   - Today: After 4 hours, may use PEG today for meds and water  - Tomorrow: may use PEG tomorrow for feedings  - Flushes: flush PEG daily with 60 ml water    Care Instructions  - antibiotic ointment to site, clean site with soap and water daily and dry thoroughly and clean site daily with half-strength hydrogen peroxide for three days, then soap and water daily.  - Dressing: change dressing on top of bumper daily    - called family to update but no answer, voicemail left    Ant Collins MD  Gastroenterology Fellow, PGY IV  Pager: [phone]

## 2019-01-07 NOTE — PLAN OF CARE
Problem: Adult Inpatient Plan of Care  Goal: Plan of Care Review  Outcome: Ongoing (interventions implemented as appropriate)  Pt shows no s/s of distress or discomfort. Safety measures met. Free from falls and injury. Denies any pain. Able to verbalize all needs. Ambulates with assistance x1. Has adequate food and fluid intake. Bed locked and placed in lowest position. Call light within reach. Bed alarm set.

## 2019-01-07 NOTE — INTERVAL H&P NOTE
The patient has been examined and the H&P has been reviewed:    There is no interval changes since last encounter.    EGD with PEG placement: dysphagia with decreased oral intake.  Sedation: GA  ASA: Per anesthesia  Mallampati: Per anesthesia    Endoscopy risks, benefits and alternative options discussed and understood by family.          Active Hospital Problems    Diagnosis  POA    Palliative care encounter [Z51.5]  Not Applicable    Goals of care, counseling/discussion [Z71.89]  Not Applicable    Insomnia [G47.00]  Yes    Dysphagia [R13.10]  Yes    Electrolyte disturbance [E87.8]  Yes    Caregiver burden [Z63.6]  Not Applicable    Mood disturbance [R45.86]  Yes    Malnutrition [E46]  Yes     Chronic    Essential hypertension [I10]  Yes     Chronic      Resolved Hospital Problems    Diagnosis Date Resolved POA    *Hypernatremia [E87.0] 12/28/2018 Yes    Acute encephalopathy [G93.40] 12/28/2018 No    Stage 4 chronic kidney disease [N18.4] 12/28/2018 Yes     Chronic    ODILIA (acute kidney injury) [N17.9] 12/28/2018 Yes

## 2019-01-08 LAB
ALBUMIN SERPL BCP-MCNC: 1.9 G/DL
ANION GAP SERPL CALC-SCNC: 5 MMOL/L
BASOPHILS # BLD AUTO: 0.02 K/UL
BASOPHILS NFR BLD: 0.2 %
BUN SERPL-MCNC: 15 MG/DL
CALCIUM SERPL-MCNC: 9.3 MG/DL
CHLORIDE SERPL-SCNC: 107 MMOL/L
CO2 SERPL-SCNC: 24 MMOL/L
CREAT SERPL-MCNC: 1.5 MG/DL
DIFFERENTIAL METHOD: ABNORMAL
EOSINOPHIL # BLD AUTO: 0.1 K/UL
EOSINOPHIL NFR BLD: 0.7 %
ERYTHROCYTE [DISTWIDTH] IN BLOOD BY AUTOMATED COUNT: 18.1 %
EST. GFR  (AFRICAN AMERICAN): 35.6 ML/MIN/1.73 M^2
EST. GFR  (NON AFRICAN AMERICAN): 30.9 ML/MIN/1.73 M^2
GLUCOSE SERPL-MCNC: 89 MG/DL
HCT VFR BLD AUTO: 21.6 %
HGB BLD-MCNC: 7.1 G/DL
IMM GRANULOCYTES # BLD AUTO: 0.06 K/UL
IMM GRANULOCYTES NFR BLD AUTO: 0.5 %
LYMPHOCYTES # BLD AUTO: 0.9 K/UL
LYMPHOCYTES NFR BLD: 8.3 %
MAGNESIUM SERPL-MCNC: 1.4 MG/DL
MCH RBC QN AUTO: 25.4 PG
MCHC RBC AUTO-ENTMCNC: 32.9 G/DL
MCV RBC AUTO: 77 FL
MONOCYTES # BLD AUTO: 0.4 K/UL
MONOCYTES NFR BLD: 3.6 %
NEUTROPHILS # BLD AUTO: 9.7 K/UL
NEUTROPHILS NFR BLD: 86.7 %
NRBC BLD-RTO: 0 /100 WBC
PHOSPHATE SERPL-MCNC: 2.3 MG/DL
PHOSPHATE SERPL-MCNC: 2.3 MG/DL
PLATELET # BLD AUTO: 198 K/UL
PMV BLD AUTO: 10.4 FL
POCT GLUCOSE: 100 MG/DL (ref 70–110)
POCT GLUCOSE: 78 MG/DL (ref 70–110)
POCT GLUCOSE: 93 MG/DL (ref 70–110)
POTASSIUM SERPL-SCNC: 3.8 MMOL/L
RBC # BLD AUTO: 2.79 M/UL
SODIUM SERPL-SCNC: 136 MMOL/L
WBC # BLD AUTO: 11.2 K/UL

## 2019-01-08 PROCEDURE — 83735 ASSAY OF MAGNESIUM: CPT

## 2019-01-08 PROCEDURE — 80069 RENAL FUNCTION PANEL: CPT

## 2019-01-08 PROCEDURE — 99232 PR SUBSEQUENT HOSPITAL CARE,LEVL II: ICD-10-PCS | Mod: ,,, | Performed by: HOSPITALIST

## 2019-01-08 PROCEDURE — 84100 ASSAY OF PHOSPHORUS: CPT

## 2019-01-08 PROCEDURE — 92526 ORAL FUNCTION THERAPY: CPT

## 2019-01-08 PROCEDURE — 11000001 HC ACUTE MED/SURG PRIVATE ROOM

## 2019-01-08 PROCEDURE — 85025 COMPLETE CBC W/AUTO DIFF WBC: CPT

## 2019-01-08 PROCEDURE — 63600175 PHARM REV CODE 636 W HCPCS: Performed by: HOSPITALIST

## 2019-01-08 PROCEDURE — 25000003 PHARM REV CODE 250: Performed by: HOSPITALIST

## 2019-01-08 PROCEDURE — 63600175 PHARM REV CODE 636 W HCPCS: Performed by: STUDENT IN AN ORGANIZED HEALTH CARE EDUCATION/TRAINING PROGRAM

## 2019-01-08 PROCEDURE — 99232 SBSQ HOSP IP/OBS MODERATE 35: CPT | Mod: ,,, | Performed by: HOSPITALIST

## 2019-01-08 RX ORDER — FOLIC ACID 1 MG/1
1 TABLET ORAL DAILY
Refills: 0
Start: 2019-01-08 | End: 2020-01-08

## 2019-01-08 RX ORDER — AMLODIPINE BESYLATE 10 MG/1
10 TABLET ORAL DAILY
Status: DISCONTINUED | OUTPATIENT
Start: 2019-01-08 | End: 2019-01-09 | Stop reason: HOSPADM

## 2019-01-08 RX ORDER — SODIUM,POTASSIUM PHOSPHATES 280-250MG
1 POWDER IN PACKET (EA) ORAL
Status: DISCONTINUED | OUTPATIENT
Start: 2019-01-08 | End: 2019-01-09 | Stop reason: HOSPADM

## 2019-01-08 RX ORDER — FERROUS SULFATE 300 MG/5ML
300 LIQUID (ML) ORAL DAILY
Refills: 0 | COMMUNITY
Start: 2019-01-08

## 2019-01-08 RX ORDER — THIAMINE HCL 100 MG
100 TABLET ORAL DAILY
Status: DISCONTINUED | OUTPATIENT
Start: 2019-01-08 | End: 2019-01-09 | Stop reason: HOSPADM

## 2019-01-08 RX ORDER — FOLIC ACID 1 MG/1
1 TABLET ORAL DAILY
Status: DISCONTINUED | OUTPATIENT
Start: 2019-01-08 | End: 2019-01-09 | Stop reason: HOSPADM

## 2019-01-08 RX ORDER — TRAZODONE HYDROCHLORIDE 50 MG/1
50 TABLET ORAL NIGHTLY
Qty: 30 TABLET | Refills: 11 | Status: SHIPPED | OUTPATIENT
Start: 2019-01-08 | End: 2020-01-08

## 2019-01-08 RX ORDER — MAGNESIUM SULFATE HEPTAHYDRATE 40 MG/ML
2 INJECTION, SOLUTION INTRAVENOUS ONCE
Status: COMPLETED | OUTPATIENT
Start: 2019-01-08 | End: 2019-01-08

## 2019-01-08 RX ORDER — BISACODYL 10 MG
10 SUPPOSITORY, RECTAL RECTAL DAILY PRN
Refills: 0 | COMMUNITY
Start: 2019-01-08

## 2019-01-08 RX ORDER — VIT C/E/ZN/COPPR/LUTEIN/ZEAXAN 250MG-90MG
1000 CAPSULE ORAL DAILY
Refills: 0 | COMMUNITY
Start: 2019-01-08

## 2019-01-08 RX ORDER — AMLODIPINE BESYLATE 10 MG/1
10 TABLET ORAL DAILY
Qty: 30 TABLET | Refills: 11 | Status: SHIPPED | OUTPATIENT
Start: 2019-01-08 | End: 2020-01-08

## 2019-01-08 RX ORDER — TRAZODONE HYDROCHLORIDE 50 MG/1
50 TABLET ORAL NIGHTLY
Status: DISCONTINUED | OUTPATIENT
Start: 2019-01-08 | End: 2019-01-09 | Stop reason: HOSPADM

## 2019-01-08 RX ORDER — LANOLIN ALCOHOL/MO/W.PET/CERES
100 CREAM (GRAM) TOPICAL DAILY
COMMUNITY
Start: 2019-01-08

## 2019-01-08 RX ORDER — CHOLECALCIFEROL (VITAMIN D3) 25 MCG
1000 TABLET ORAL DAILY
Status: DISCONTINUED | OUTPATIENT
Start: 2019-01-08 | End: 2019-01-09 | Stop reason: HOSPADM

## 2019-01-08 RX ADMIN — POTASSIUM & SODIUM PHOSPHATES POWDER PACK 280-160-250 MG 1 PACKET: 280-160-250 PACK at 11:01

## 2019-01-08 RX ADMIN — AMLODIPINE BESYLATE 10 MG: 10 TABLET ORAL at 10:01

## 2019-01-08 RX ADMIN — VITAMIN D, TAB 1000IU (100/BT) 1000 UNITS: 25 TAB at 10:01

## 2019-01-08 RX ADMIN — POTASSIUM & SODIUM PHOSPHATES POWDER PACK 280-160-250 MG 1 PACKET: 280-160-250 PACK at 10:01

## 2019-01-08 RX ADMIN — HEPARIN SODIUM 5000 UNITS: 5000 INJECTION, SOLUTION INTRAVENOUS; SUBCUTANEOUS at 05:01

## 2019-01-08 RX ADMIN — POTASSIUM & SODIUM PHOSPHATES POWDER PACK 280-160-250 MG 1 PACKET: 280-160-250 PACK at 05:01

## 2019-01-08 RX ADMIN — Medication 100 MG: at 10:01

## 2019-01-08 RX ADMIN — HEPARIN SODIUM 5000 UNITS: 5000 INJECTION, SOLUTION INTRAVENOUS; SUBCUTANEOUS at 03:01

## 2019-01-08 RX ADMIN — MAGNESIUM SULFATE IN WATER 2 G: 40 INJECTION, SOLUTION INTRAVENOUS at 10:01

## 2019-01-08 RX ADMIN — HEPARIN SODIUM 5000 UNITS: 5000 INJECTION, SOLUTION INTRAVENOUS; SUBCUTANEOUS at 10:01

## 2019-01-08 RX ADMIN — THERA TABS 1 TABLET: TAB at 10:01

## 2019-01-08 RX ADMIN — FOLIC ACID 1 MG: 1 TABLET ORAL at 10:01

## 2019-01-08 RX ADMIN — TRAZODONE HYDROCHLORIDE 50 MG: 50 TABLET ORAL at 10:01

## 2019-01-08 RX ADMIN — CEFTRIAXONE SODIUM 1 G: 1 INJECTION, POWDER, FOR SOLUTION INTRAMUSCULAR; INTRAVENOUS at 10:01

## 2019-01-08 NOTE — PT/OT/SLP PROGRESS
Speech Language Pathology Treatment    Patient Name:  Cesilia Delgado   MRN:  [id number]  Admitting Diagnosis: Hypernatremia    Recommendations:                 General Recommendations:  Dysphagia therapy  Diet recommendations:  Puree, Liquid Diet Level: Thin   Aspiration Precautions: 1 bite/sip at a time, 1:1 Assistance with meals, Check for pocketing/oral residue, Feed only when awake/alert, Frequent oral care, HOB to 90 degrees, Meds crushed in puree, Monitor for s/s of aspiration, Remain upright 30 minutes post meal, Small bites/sips and Strict aspiration precautions   General Precautions: Standard, aspiration, pureed diet, nectar thick, fall  Communication strategies:  provide increased time to answer and go to room if call light pushed    Subjective     Patient awake but lethargic during session  Communicated with nurse prior to session    Pain/Comfort:  Pain Rating 1: 0/10  Pain Rating Post-Intervention 1: 0/10    Objective:     Has the patient been evaluated by SLP for swallowing?   Yes  Keep patient NPO? No   Current Respiratory Status: room air      Patient seen for continued swallow assessment and monitoring of diet tolerance. Patient s/p PEG placement yesterday. She was sitting up in bed during session. Patient produced some voicing and an adequate throat clear prior to PO trials. She tolerated thin liquids x5 (via straw) and puree x3 (tsp pudding) with no overt signs of aspiration. C/w prior sessions, patient displayed oral holding with both consistencies, but this was improved from prior sessions. Further PO trials deferred 2' to patient fatigue. SLP educated patient regarding her current diet recs, but she did not exhibit understanding. Patient left in room with call light within reach. Patient's swallow appears to be safe and functional assuming she is awake/alert and aspiration precautions are followed.     Assessment:     Cesilia Delgado is a 88 y.o. female with an SLP diagnosis of Dysphagia.     Goals:    Multidisciplinary Problems     SLP Goals        Problem: SLP Goal    Goal Priority Disciplines Outcome   SLP Goal     SLP Ongoing (interventions implemented as appropriate)   Description:  Goals expected to be met by 1/14/19  1. Pt will tolerate puree diet with no overt s/s of aspiration.  2. Pt will participate in advanced diet trials with no overt s/s of aspiration to determine safest/least restrictive PO diet                           Plan:     · Patient to be seen:  3 x/week   · Plan of Care expires:  01/23/19  · Plan of Care reviewed with:  patient   · SLP Follow-Up:  Yes       Discharge recommendations:  nursing facility, skilled   Barriers to Discharge:  Level of Skilled Assistance Needed     Time Tracking:     SLP Treatment Date:   01/08/19  Speech Start Time:  1051  Speech Stop Time:  1104     Speech Total Time (min):  13 min    Billable Minutes: Treatment Swallowing Dysfunction 13    Shaun Reyna CF-SLP  Speech-Language Pathology  Pager: [phone]   01/08/2019

## 2019-01-08 NOTE — PLAN OF CARE
Requested snf auth from [facility] for admission to [facility] tomorrow via [employer] and manual fax.

## 2019-01-08 NOTE — PLAN OF CARE
Problem: Adult Inpatient Plan of Care  Goal: Plan of Care Review  Pt is disoriented, lethargic,  turned, repositioned with wedge, perineal care provided, sacral foam protective dressing applied, skin barrier cream on, heel boots on, thermal blanket applied,antibiotic IV push  given , blood glucose WNL,PEC dressing dry and intact,bed low, breaks locked, SR up x2, call light within reach, will continue to monitor.

## 2019-01-08 NOTE — PLAN OF CARE
[facility]     Department of Hospital Medicine     [address] [place] [address]     [phone] after hours  [phone] fax       NURSING HOME ORDERS    Patient Name: Cesilia Delgado  YOB: 1930 01/09/2019    Admit to Nursing Home:  skilled  Bed       Diagnoses:  Active Hospital Problems    Diagnosis  POA    Palliative care encounter [Z51.5]  Not Applicable    Goals of care, counseling/discussion [Z71.89]  Not Applicable    Insomnia [G47.00]  Yes    Dysphagia [R13.10]  Yes    Electrolyte disturbance [E87.8]  Yes    Caregiver burden [Z63.6]  Not Applicable    Mood disturbance [R45.86]  Yes    Malnutrition [E46]  Yes     Chronic    Essential hypertension [I10]  Yes     Chronic      Resolved Hospital Problems    Diagnosis Date Resolved POA    *Hypernatremia [E87.0] 12/28/2018 Yes    Acute encephalopathy [G93.40] 12/28/2018 No    Stage 4 chronic kidney disease [N18.4] 12/28/2018 Yes     Chronic    ODILIA (acute kidney injury) [N17.9] 12/28/2018 Yes       Patient is homebound due to:  Hypernatremia    Allergies:Review of patient's allergies indicates:  No Known Allergies    Vitals: Per NH protocol    Diet:               Special Dysphagia Diet:   Puree, Liquid Diet Level: Thin   Aspiration Precautions: 1 bite/sip at a time, 1:1 Assistance with meals, Check for pocketing/oral residue, Feed only when awake/alert, Frequent oral care, HOB to 90 degrees, Meds crushed in puree, Monitor for s/s of aspiration, Remain upright 30 minutes post meal, Small bites/sips and Strict aspiration precautions        Tube Feeding:  Isosource at a goal rate of 35 ml/hr.   -Bolus recs: 3.5 cans Isosource daily to provide 1313 kcal, 60 gm pro, and 669 ml free water. An additional 700 ml free water will be needed daily for normal hydration.    Acitivities:    - Up in a chair each morning as tolerated  - Ambulate with assistance to bathroom  - Scheduled walks once  each shift (every 8 hours)  - May ambulate independently  - May use walker, cane, or self-propelled wheelchair       - Weight bearing: as tolerated    LABS:  Per facility protocol    Nursing Precautions:    - Aspiration precautions:             - Total assistance with meals            -  Upright 90 degrees befor during and after meals             -  Suction at bedside          - Fall precautions per nursing home protocol   - Decubitus precautions:        -  for positioning   - Pressure reducing foam mattress   - Turn patient every two hours. Use wedge pillows to anchor patient       Physical Therapy to evaluate and treat     Occupational Therapy to evaluate and treat     Speech Therapy  to evaluate and treat     Nutrition to evaluate and recommend diet       MISCELLANEOUS CARE:      PEG Care:  Clean site every 24 hours       Routine Skin for Bedridden Patients:  Apply moisture barrier cream to all    skin folds and wet areas in perineal area daily and after baths and                           all bowel movements.                      DIABETES CARE:  N/A      Medications: Discontinue all previous medication orders, if any. See new list below.     Cesilia Delgado   Home Medication Instructions MARY:[id number]    Printed on:01/08/19 1121   Medication Information                      amLODIPine (NORVASC) 10 MG tablet  1 tablet (10 mg total) by Per G Tube route once daily.             bisacodyl (DULCOLAX) 10 mg Supp  Place 1 suppository (10 mg total) rectally daily as needed.             cholecalciferol, vitamin D3, (VITAMIN D3) 1,000 unit capsule  1 capsule (1,000 Units total) by Per G Tube route once daily.             ferrous sulfate 300 mg (60 mg iron)/5 mL syrup  5 mLs (300 mg total) by Per G Tube route once daily.             folic acid (FOLVITE) 1 MG tablet  1 tablet (1 mg total) by Per G Tube route once daily.             multivitamin (THERAGRAN) tablet  1 tablet by Per G Tube route once daily.              nut.tx.impaired renal fxn,soy (NEPRO) 0.08-1.80 gram-kcal/mL Liqd  Take 1 Can by mouth once daily.             thiamine 100 MG tablet  1 tablet (100 mg total) by Per G Tube route once daily.             traZODone (DESYREL) 50 MG tablet  1 tablet (50 mg total) by Per G Tube route every evening.             _________________________________  Nabil Brandt MD  01/09/2019

## 2019-01-08 NOTE — PROGRESS NOTES
Patient incontinent of urine and soft small to medium brown BM. Cleaned and diaper changed. No skin breakdown noted to this area.

## 2019-01-08 NOTE — PROGRESS NOTES
Progress Note  Hospital Medicine    Admit Date: 12/22/2018  Length of Stay:  LOS: 16 days     SUBJECTIVE:         Follow-up For:  Hypernatremia    HPI/Interval history (See H&P for complete P,F,SHx) :     AAOX 1.  s/ PEG  1/7/19 . started meds and tube feeding today       Review of Systems: List if applicable  Unable to perform ROS: encephalpathy- AAOX 1 today (baseline dementia  denies pain        OBJECTIVE:     Vital Signs Range (Last 24H):  Temp:  [97.2 °F (36.2 °C)-98.5 °F (36.9 °C)]   Pulse:  [57-82]   Resp:  [14-18]   BP: (111-144)/(62-75)   SpO2:  [94 %-100 %]     Physical Exam:  Physical Exam   Constitutional: No distress.   Severely frail & thin appearing elderly woman   HENT:   Head: Normocephalic and atraumatic.   Facial muscle waisting    Eyes: No scleral icterus.    Neck: Normal range of motion. Neck supple. No JVD present. No thyromegaly present. NGT insitu   Cardiovascular: Normal rate, regular rhythm and normal heart sounds.   Pulmonary/Chest: Effort normal and breath sounds normal. No stridor. No respiratory distress. She has no wheezes. She has no rales.   Abdominal: Soft. Bowel sounds are normal. She exhibits no distension. There is no tenderness. There is no guarding. PEG insitu   Musculoskeletal: She exhibits no edema, tenderness or deformity.   L. Elbow healing bruise    Neurological: She is alert.   baseline dementia - AAOX 1 today  Skin: Skin is warm and dry. She is not diaphoretic.   Psychiatric: She has a normal mood and affect.   Nursing note and vitals reviewed.            Medications:  Medication list was reviewed and changes noted under Assessment/Plan.      Current Facility-Administered Medications:     amLODIPine tablet 10 mg, 10 mg, Per G Tube, Daily, Nabil Brandt MD    bisacodyl suppository 10 mg, 10 mg, Rectal, Daily PRN, Neda Snider NP    calcium-vitamin D3 500 mg(1,250mg) -200 unit per tablet 1 tablet, 1 tablet, Per NG tube, Once, MATTHEW Mcgraw     cefTRIAXone injection 1 g, 1 g, Intravenous, Q24H, Nabil Brandt MD, 1 g at 01/07/19 2226    dextrose 5 % and 0.45 % NaCl infusion, , Intravenous, Continuous, Simeon Kaye MD, Last Rate: 50 mL/hr at 01/07/19 1938    folic acid tablet 1 mg, 1 mg, Per G Tube, Daily, Nbail Brandt MD    heparin (porcine) injection 5,000 Units, 5,000 Units, Subcutaneous, Q8H, Keri Bangura MD, 5,000 Units at 01/08/19 0555    magnesium sulfate 2g in water 50mL IVPB (premix), 2 g, Intravenous, Once, Nabil Brandt MD    multivitamin tablet 1 tablet, 1 tablet, Per G Tube, Daily, Nabil Brandt MD    potassium, sodium phosphates 280-160-250 mg packet 1 packet, 1 packet, Per G Tube, QID (AC & HS), Nabil Brandt MD    sodium chloride 0.9% flush 3 mL, 3 mL, Intravenous, PRN, Keri Bangura MD    thiamine tablet 100 mg, 100 mg, Per G Tube, Daily, Nabil Brandt MD    traZODone tablet 50 mg, 50 mg, Per G Tube, QHS, Nabil Brandt MD    vitamin D 1000 units tablet 1,000 Units, 1,000 Units, Per G Tube, Daily, Nabil Brandt MD    bisacodyl, sodium chloride 0.9%    Laboratory/Diagnostic Data:  Reviewed and noted in plan where applicable- Please see chart for full lab data.    Recent Labs   Lab 01/06/19  0549 01/07/19  0555 01/08/19  0607   WBC 11.62 8.13 11.20   HGB 7.2* 7.0* 7.1*   HCT 21.9* 21.0* 21.6*    176 198       Recent Labs   Lab 01/06/19  0549 01/07/19  0555 01/08/19  0607    139 136   K 3.9 3.7 3.8    110 107   CO2 25 24 24   BUN 14 16 15   CREATININE 1.7* 1.8* 1.5*   GLU 83 80 89   CALCIUM 9.4 9.2 9.3   MG 1.4* 1.5* 1.4*   PHOS 2.4*  2.4* 2.6*  2.6* 2.3*  2.3*       Recent Labs   Lab 01/06/19  0549 01/07/19  0555 01/08/19  0607   ALBUMIN 1.8* 1.8* 1.9*        Microbiology labs for the last week  Microbiology Results (last 7 days)     Procedure Component Value Units Date/Time    Urine culture [[id number]]  (Susceptibility) Collected:  01/05/19 0521    Order Status:   Completed Specimen:  Urine Updated:  01/07/19 1156     Urine Culture, Routine --     ESCHERICHIA COLI  >100,000 cfu/ml  No other significant isolate      Narrative:       Preferred Collection Type->Urine, Clean Catch    Blood culture [[id number]] Collected:  12/31/18 0811    Order Status:  Completed Specimen:  Blood Updated:  01/05/19 1212     Blood Culture, Routine No growth after 5 days.    Narrative:       Collection has been rescheduled by AT3 at 12/31/2018 07:34 Reason:   was able to get one set of blood culture and her renal before her   veins started to blow  Collection has been rescheduled by AT3 at 12/31/2018 07:34 Reason:   was able to get one set of blood culture and her renal before her   veins started to blow    Blood culture [[id number]] Collected:  12/31/18 0732    Order Status:  Completed Specimen:  Blood Updated:  01/05/19 1012     Blood Culture, Routine No growth after 5 days.           Imaging Results          X-Ray Abdomen Flat And Erect (Final result)  Result time 12/23/18 00:15:32    Final result by Zay Hernadez MD (12/23/18 00:15:32)                 Impression:      Nonobstructed bowel-gas pattern.      Electronically signed by: Zay Hernadez MD  Date:    12/23/2018  Time:    00:15             Narrative:    EXAMINATION:  XR ABDOMEN FLAT AND ERECT    CLINICAL HISTORY:  Constipation, unspecified    TECHNIQUE:  Flat and erect AP views of the abdomen were preformed.    COMPARISON:  None    FINDINGS:  Costochondral calcifications project over the upper abdomen.  Bowel gas pattern is non-obstructive.  No evidence for pneumoperitoneum.  Partially visualized fixation hardware at the right hip.                               CT Head Without Contrast (Final result)  Result time 12/23/18 00:26:39    Final result by Zay Hernadez MD (12/23/18 00:26:39)                 Impression:      1. No evidence of acute intracranial hemorrhage or major vascular distribution infarct.  2. Remote right  "occipital infarct.  Remote lacunar infarct right basal ganglia.  3. Chronic microvascular ischemic change.  4. Generalized cerebral volume loss.    Electronically signed by resident: Shaun Juan  Date:    12/22/2018  Time:    23:59    Electronically signed by: Zay Hernadez MD  Date:    12/23/2018  Time:    00:26             Narrative:    EXAMINATION:  CT HEAD WITHOUT CONTRAST    CLINICAL HISTORY:  Confusion/delirium, altered LOC, unexplained    TECHNIQUE:  Low dose axial images were obtained through the head.  Coronal and sagittal reformations were also performed. Contrast was not administered.    COMPARISON:  MRI brain 06/28/2016, CT head 06/27/2016    FINDINGS:  Age-appropriate generalized cerebral volume loss with compensatory widening of the sulci and ventricular system.  No evidence of hydrocephalus.  No extra-axial blood or fluid collections.    Encephalomalacia of the right paramedian occipital lobe compatible with remote infarct.  Remote lacunar infarct right basal ganglia, unchanged.  Patchy and confluent hypoattenuation of the supratentorial white matter consistent with chronic microvascular ischemic change.    No evidence of acute intracranial hemorrhage or major vascular distribution infarct.  No parenchymal mass or edema.    No calvarial fracture.  Mastoid air cells and paranasal sinuses are clear.                                Estimated body mass index is 13.9 kg/m² as calculated from the following:    Height as of this encounter: 5' 2" (1.575 m).    Weight as of this encounter: 34.5 kg (76 lb).    I & O (Last 24H):    Intake/Output Summary (Last 24 hours) at 1/8/2019 1107  Last data filed at 1/7/2019 2200  Gross per 24 hour   Intake 215 ml   Output --   Net 215 ml       Estimated Creatinine Clearance: 14.1 mL/min (A) (based on SCr of 1.5 mg/dL (H)).    ASSESSMENT/PLAN:     Active Problems:    Active Hospital Problems    Diagnosis  POA    *Hypernatremia [E87.0]    Na of 174 upon " admission  -serial BMPs q 6hrly  -suspect 2/2 dehydration from advanced dementia  -patient down to Na of 152 on d5w at 75 cc/hr   will transition to 400 cc free water boluses   TID(equivalent to 50 cc/hr x 24hrs)     AAOX 1.baseline dementia   sodium at 136. poor oral intake around 25% of meals, on pureed diet.  discussed goals of care with POA this AM , who is requesting PEG. palliative care consulted-- POA feels it would be in there best interest of the patient to have PEG place, not agreeing for comfort care .  AAOX 1.  s/ PEG  19 . started meds and tube feeding todaystarted on boost  . - poor oral intake. advanced to puree diet  needs assistance with feeding    Hypothermia -resolved. hailey annalise as needed      Yes    Goals of care, counseling/discussion [Z71.89]full code for now . palliative care consulted . POA wanted PEG  Not Applicable    Insomnia [G47.00]on trazodone  Yes    Dysphagia [R13.10]s/p SLP Evaluation, likely encephalopathy. started clear liquids   NGT placed  and started trickle feeds  - NGT feeds advanced as tolerated   - s/ PEG  19 . started meds and tube feedingTF rate (of Isosource 1.5) to 35 mL/hr        Unknown    Electrolyte disturbance [E87.8]as above  Unknown    Caregiver burden [Z63.6]Patient's son  four months ago and family noticed a decline in her mental status since then. NH options for placement  Not Applicable    Mood disturbance [R45.86] R/o depression post sons's demise  Unknown    Acute encephalopathy [G93.40]encephalpathy  baseline dementia- AAOX 1 today. likely metabolic enecophalopathy. improving .on thiamine, folate and vitamin D for malnutrition, dietitian consulted for diet, currently on tricklefeeding by NGT, vitamins levels in process.afebrile, non elevated WBC,    UTI on UA- E coli pansensitive . CT head - No evidence of acute intracranial hemorrhage or major vascular distribution infarct. Remote right occipital infarct. completed 3 days of  ceftriaxone     Unknown    Malnutrition [E46]Low pre-albumin/ SLP eval. nutrition consulted On GT feeding Isosource at 35ml  Yes     Chronic    Stage 4 chronic kidney disease [N18.4]  Yes     Chronic    ODILIA (acute kidney injury) [N17.9]on CKD - Cr 5.6--> 2.5 --1.6 improving with IV hydration. monitor    anemia - Hb 13--> 9.5-- 7.1 microcytic.  likely hemoconcentration. ferrokinetics   Thrombocytopenia resolved  Hypomagnesemia - replaced   Falls - 2 episodes at home no repoted LOC. obtain CPK - normal   Unknown    Essential hypertension [I10]controlled on amlodipine  Yes     Chronic      Resolved Hospital Problems   No resolved problems to display.         Disposition- NH     DVT prophylaxis addressed with: AMBER Brandt MD  Attending Staff Physician  [facility] Medicine  pager- [phone] [id number] [phone]

## 2019-01-08 NOTE — PLAN OF CARE
Problem: SLP Goal  Goal: SLP Goal  Goals expected to be met by 1/14/19  1. Pt will tolerate puree diet with no overt s/s of aspiration.  2. Pt will participate in advanced diet trials with no overt s/s of aspiration to determine safest/least restrictive PO diet          Patient seen for swallow assessment. SLP recommending PUREE DIET/THIN LIQUIDS with continued use of PEG as primary means of nutrition.     Shaun Reyna CF-SLP  Speech-Language Pathology  Pager: [phone]

## 2019-01-08 NOTE — DISCHARGE SUMMARY
[facility] Hospital Medicine  Discharge Summary      Patient Name: Cesilia Delgado  MRN: [id number]  Admission Date: 2018  Hospital Length of Stay: 17 days  Discharge Date and Time:  2019 11:23 AM  Attending Physician: Nabil Brandt MD   Discharging Provider: Nabil Brandt MD  Primary Care Provider: Jena Alvarez MD    Hospital Medicine Team: [facility] HOSP MED B Nabil Brandt MD    HPI: Ms. Delgado is an 87yo woman with PMH of CKD, HTN, pulmonary HTN, former smoker, dementia, and Fe deficiency anemia who is brought in by her daughter in law with complaints of 1-2 weeks onset of altered mental status.      ICU was consulted for severe hydration & Na level found to be 174.      Daughter in law states that in the past week, patient has been more confused than baseline where she is more bed bound and unable to carry out her ADLs independently. She has been also staying up all night. Patient has had decrease in appetite & poor oral intake as well. Family has spoon fed her half a bowl of oatmeal & broccoli cheddar soup earlier. Daughter in law has noted dysphagia occurring over the past week where the patient will have difficulty swallowing and spit up a little bit. No vomiting. When asked about odynophagia she is unsure but states patient will sometimes wince in pain. Patient has also has not had a BM and oliguria producing small amount of urine in her diaper 1-2x yesterday.  She has also had 1 day of dry cough, no noticed SOB or chest pain.      At baseline, Ms. Delgado is able to prepare her own meals, ambulate, shower, etc. Daughter in law noticed patient's dementia initially started 2 years ago, but patient's son had been in denial and it was not worked up. Patient's son  four months ago and family noticed a decline in her mental status since then.      Of note, a week ago, patient had a fall trying to get out of bed and was seen in [place]. They report that imaging was done  of her hip which showed no fracture. She did not hit her head or have LOC.   Per chart review, it seems as though family has been in search for a nursing home placement.      Hospital/ICU Course:  Patient NA improving with D5 .45 NS, down from 174 to 163 overnight, switched to D5 with rate of 150 ml/hr, renal function improving, on thiamine, folate and vitamin D for malnutrition, dietitian consulted for diet, currently on tricklefeeding by NGT, vitamins levels in process, SW consulted for placement. Trickle feeds were advanced to full feeds and IVF fluids transitioned to FW boluses. Patient was stepped down to the floor on 12/25.       Active Problems:            Active Hospital Problems     Diagnosis   POA    *Hypernatremia [E87.0]    Na of 174 upon admission  -serial BMPs q 6hrly  -suspect 2/2 dehydration from advanced dementia  -patient down to Na of 152 on d5w at 75 cc/hr   will transition to 400 cc free water boluses   TID(equivalent to 50 cc/hr x 24hrs)      AAOX 1. Enceophalopathy   sodium at 136. poor oral intake around 25% of meals, on pureed diet.  discussed goals of care with POA this AM , who is requesting PEG. palliative care consulted-- POA feels it would be in there best interest of the patient to have PEG place, not agreeing for comfort care .  AAOX 1.  s/ PEG  1/7/19 . started meds and tube feeding todaystarted on boost  . - poor oral intake. advanced to puree diet  needs assistance with feeding     Hypothermia today.  Rectal temp today 93.3. Bear hugger ordered. Pancultures oredered          Hypothermia today- hailey huggera as needed       Yes    Goals of care, counseling/discussion [Z71.89]full code for now . palliative care consulted    Not Applicable    Insomnia [G47.00]on trazodone   Yes    Dysphagia [R13.10]s/p SLP Evaluation, likely encephalopathy. started clear liquids   NGT placed 12/23 and started trickle feeds  - NGT feeds advanced as tolerated 12/25  - SLP recommended liquid diet and  keeping NGT for now. TF rate (of Isosource 1.5) to 35 mL/hr          Unknown    Electrolyte disturbance [E87.8]as above   Unknown    Caregiver burden [Z63.6]Patient's son  four months ago and family noticed a decline in her mental status since then. NH options for placement   Not Applicable    Mood disturbance [R45.86] R/o depression post sons's demise   Unknown    Acute encephalopathy [G93.40]encephalpathy  improving - AAOX 1 today. likely metabolic enecophalopathy. improving .on thiamine, folate and vitamin D for malnutrition, dietitian consulted for diet, currently on tricklefeeding by NGT, vitamins levels in process.afebrile, non elevated WBC,     UTI on UA- E coli pansensitive . CT head - No evidence of acute intracranial hemorrhage or major vascular distribution infarct. Remote right occipital infarct. completed 3 days of ceftriaxone       Unknown    Malnutrition [E46]Low pre-albumin/ SLP eval. nutrition consulted On GT feeding Isosource at 35ml   Yes       Chronic    Stage 4 chronic kidney disease [N18.4]   Yes       Chronic    ODILIA (acute kidney injury) [N17.9]on CKD - Cr 5.6--> 2.5 --1.6 improving with IV hydration. monitor     anemia - Hb 13--> 9.5-- 7.1 microcytic.  likely hemoconcentration. ferrokinetics   Thrombocytopenia resolved  Hypomagnesemia - replaced   Falls - 2 episodes at home no repoted LOC. obtain CPK - normal    Unknown    Essential hypertension [I10]controlled on amlodipine        Being discharged to NH          Procedure(s) (LRB):  EGD (ESOPHAGOGASTRODUODENOSCOPY) (N/A)  INSERTION, PEG TUBE (N/A)        Consults:   Consults (From admission, onward)        Status Ordering Provider     Inpatient consult to Critical Care Medicine  Once     Provider:  (Not yet assigned)    Completed JOSEPH CEDILLO     Inpatient consult to Gastroenterology  Once     Provider:  (Not yet assigned)    Completed JOSEPH PATEL     Inpatient consult to Midline team  Once     Provider:  (Not yet  assigned)    Completed MELINDA COLÓN     Inpatient consult to Palliative Care  Once     Provider:  (Not yet assigned)    Completed JOHNNA RODRIGUEZ     Inpatient consult to Social Work/Case Management  Once     Provider:  (Not yet assigned)    Acknowledged JOVAN RIVAS          Final Active Diagnoses:    Diagnosis Date Noted POA    Palliative care encounter [Z51.5] 12/31/2018 Not Applicable    Goals of care, counseling/discussion [Z71.89] 12/23/2018 Not Applicable    Insomnia [G47.00] 12/23/2018 Yes    Dysphagia [R13.10] 12/23/2018 Yes    Electrolyte disturbance [E87.8] 12/23/2018 Yes    Caregiver burden [Z63.6] 12/23/2018 Not Applicable    Mood disturbance [R45.86] 12/23/2018 Yes    Malnutrition [E46] 11/13/2017 Yes     Chronic    Essential hypertension [I10] 05/28/2013 Yes     Chronic      Problems Resolved During this Admission:    Diagnosis Date Noted Date Resolved POA    PRINCIPAL PROBLEM:  Hypernatremia [E87.0] 12/23/2018 12/28/2018 Yes    Acute encephalopathy [G93.40] 12/23/2018 12/28/2018 No    Stage 4 chronic kidney disease [N18.4] 06/30/2016 12/28/2018 Yes     Chronic    ODILIA (acute kidney injury) [N17.9] 06/27/2016 12/28/2018 Yes      Discharged Condition: fair    Disposition: Skilled Nursing Facility    Follow Up:  Follow-up Information     Jena Alvarez MD In 1 week.    Specialty:  Internal Medicine  Contact information:  [address] [facility] [address]  [phone]                 Patient Instructions:   No discharge procedures on file.  Medications:  Reconciled Home Medications:      Medication List      START taking these medications    bisacodyl 10 mg Supp  Commonly known as:  DULCOLAX  Place 1 suppository (10 mg total) rectally daily as needed.     cholecalciferol (vitamin D3) 1,000 unit capsule  Commonly known as:  VITAMIN D3  1 capsule (1,000 Units total) by Per G Tube route once daily.     ferrous sulfate 300 mg (60 mg iron)/5 mL syrup  5 mLs (300 mg  total) by Per G Tube route once daily.  Replaces:  ferrous sulfate 325 mg (65 mg iron) Tab tablet     folic acid 1 MG tablet  Commonly known as:  FOLVITE  1 tablet (1 mg total) by Per G Tube route once daily.     multivitamin tablet  Commonly known as:  THERAGRAN  1 tablet by Per G Tube route once daily.     thiamine 100 MG tablet  1 tablet (100 mg total) by Per G Tube route once daily.        CHANGE how you take these medications    amLODIPine 10 MG tablet  Commonly known as:  NORVASC  1 tablet (10 mg total) by Per G Tube route once daily.  What changed:    · medication strength  · how much to take  · how to take this     traZODone 50 MG tablet  Commonly known as:  DESYREL  1 tablet (50 mg total) by Per G Tube route every evening.  What changed:  how to take this        CONTINUE taking these medications    nut.tx.imp.renal fxn,lac-reduc 0.08 gram-1.8 kcal/mL Liqd  Commonly known as:  NEPRO  Take 1 Can by mouth once daily.        STOP taking these medications    famotidine 40 MG tablet  Commonly known as:  PEPCID     ferrous sulfate 325 mg (65 mg iron) Tab tablet  Commonly known as:  FEOSOL  Replaced by:  ferrous sulfate 300 mg (60 mg iron)/5 mL syrup     SHINGRIX (PF) 50 mcg/0.5 mL injection  Generic drug:  varicella-zoster [id number] (PF)            Significant Diagnostic Studies: Labs:   BMP:   Recent Labs   Lab 01/08/19  0607 01/09/19  0632   GLU 89 88    137   K 3.8 4.1    106   CO2 24 26   BUN 15 15   CREATININE 1.5* 1.6*   CALCIUM 9.3 9.4   MG 1.4* 2.1   , CMP   Recent Labs   Lab 01/08/19  0607 01/09/19  0632 01/09/19  0919    137  --    K 3.8 4.1  --     106  --    CO2 24 26  --    GLU 89 88  --    BUN 15 15  --    CREATININE 1.5* 1.6*  --    CALCIUM 9.3 9.4  --    ALBUMIN 1.9* 2.0* 2.0*   ANIONGAP 5* 5*  --    ESTGFRAFRICA 35.6* 32.9*  --    EGFRNONAA 30.9* 28.6*  --    , CBC   Recent Labs   Lab 01/08/19  0607 01/09/19  0632   WBC 11.20 7.46   HGB 7.1* 7.5*   HCT 21.6* 23.1*     265   , INR No results found for: INR, PROTIME, Lipid Panel   Lab Results   Component Value Date    CHOL 199 10/04/2018    HDL 56 10/04/2018    LDLCALC 130.6 10/04/2018    TRIG 62 10/04/2018    CHOLHDL 28.1 10/04/2018   , Troponin No results for input(s): TROPONINI in the last 168 hours., A1C: No results for input(s): HGBA1C in the last 4320 hours. and All labs within the past 24 hours have been reviewed  Microbiology:   Blood Culture   Lab Results   Component Value Date    LABBLOO No growth after 5 days. 12/31/2018   , Sputum Culture No results found for: GSRESP, RESPIRATORYC and Urine Culture    Lab Results   Component Value Date    LABURIN  01/05/2019     ESCHERICHIA COLI  >100,000 cfu/ml  No other significant isolate       Radiology:   Imaging Results          X-Ray Abdomen Flat And Erect (Final result)  Result time 12/23/18 00:15:32    Final result by Zay Hernadez MD (12/23/18 00:15:32)                 Impression:      Nonobstructed bowel-gas pattern.      Electronically signed by: Zay Hernadez MD  Date:    12/23/2018  Time:    00:15             Narrative:    EXAMINATION:  XR ABDOMEN FLAT AND ERECT    CLINICAL HISTORY:  Constipation, unspecified    TECHNIQUE:  Flat and erect AP views of the abdomen were preformed.    COMPARISON:  None    FINDINGS:  Costochondral calcifications project over the upper abdomen.  Bowel gas pattern is non-obstructive.  No evidence for pneumoperitoneum.  Partially visualized fixation hardware at the right hip.                               CT Head Without Contrast (Final result)  Result time 12/23/18 00:26:39    Final result by Zay Hernadez MD (12/23/18 00:26:39)                 Impression:      1. No evidence of acute intracranial hemorrhage or major vascular distribution infarct.  2. Remote right occipital infarct.  Remote lacunar infarct right basal ganglia.  3. Chronic microvascular ischemic change.  4. Generalized cerebral volume loss.    Electronically signed by  resident: Shaun Juan  Date:    12/22/2018  Time:    23:59    Electronically signed by: Zay Hernadez MD  Date:    12/23/2018  Time:    00:26             Narrative:    EXAMINATION:  CT HEAD WITHOUT CONTRAST    CLINICAL HISTORY:  Confusion/delirium, altered LOC, unexplained    TECHNIQUE:  Low dose axial images were obtained through the head.  Coronal and sagittal reformations were also performed. Contrast was not administered.    COMPARISON:  MRI brain 06/28/2016, CT head 06/27/2016    FINDINGS:  Age-appropriate generalized cerebral volume loss with compensatory widening of the sulci and ventricular system.  No evidence of hydrocephalus.  No extra-axial blood or fluid collections.    Encephalomalacia of the right paramedian occipital lobe compatible with remote infarct.  Remote lacunar infarct right basal ganglia, unchanged.  Patchy and confluent hypoattenuation of the supratentorial white matter consistent with chronic microvascular ischemic change.    No evidence of acute intracranial hemorrhage or major vascular distribution infarct.  No parenchymal mass or edema.    No calvarial fracture.  Mastoid air cells and paranasal sinuses are clear.                              Cardiac Graphics: EKG- Normal sinus rhythm    Pending Diagnostic Studies:     Procedure Component Value Units Date/Time    Basic metabolic panel [[id number]] Collected:  12/23/18 1730    Order Status:  Sent Lab Status:  In process Updated:  12/23/18 1731    Specimen:  Blood     Vitamin B12 Deficiency Panel [[id number]] Collected:  12/23/18 1234    Order Status:  Sent Lab Status:  In process Updated:  12/23/18 1234    Specimen:  Blood         Indwelling Lines/Drains at time of discharge:   Lines/Drains/Airways     Drain            11 days         Gastrostomy/Enterostomy 01/07/19 1700 Percutaneous endoscopic gastrostomy (PEG) less than 1 day                       Nabil Brandt MD  Department of Hospital Medicine  [facility]  [place]

## 2019-01-08 NOTE — NURSING TRANSFER
Nursing Transfer Note      1/7/2019     Transfer To: [address] from [facility]    Transfer via stretcher    Transfer with cardiac monitoring and Mid-line catheter and PEG tube.    Transported by PCT    Medicines sent: NONe    Chart send with patient: Yes    Notified: Report called to RN    Patient reassessed at: 1800. Awake and alert. VSS. Appears comfortable. Speech somewhat garbled. Doesn't follow all commands. Disoriented x 4. Stable condition.    Upon arrival to floor: bed in lowest position

## 2019-01-09 VITALS
HEIGHT: 62 IN | RESPIRATION RATE: 16 BRPM | HEART RATE: 71 BPM | DIASTOLIC BLOOD PRESSURE: 72 MMHG | BODY MASS INDEX: 13.98 KG/M2 | WEIGHT: 76 LBS | SYSTOLIC BLOOD PRESSURE: 136 MMHG | TEMPERATURE: 99 F | OXYGEN SATURATION: 96 %

## 2019-01-09 LAB
ALBUMIN SERPL BCP-MCNC: 2 G/DL
ALBUMIN SERPL BCP-MCNC: 2 G/DL
ANION GAP SERPL CALC-SCNC: 5 MMOL/L
BASOPHILS # BLD AUTO: 0.04 K/UL
BASOPHILS NFR BLD: 0.5 %
BUN SERPL-MCNC: 15 MG/DL
CALCIUM SERPL-MCNC: 9.4 MG/DL
CHLORIDE SERPL-SCNC: 106 MMOL/L
CO2 SERPL-SCNC: 26 MMOL/L
CREAT SERPL-MCNC: 1.6 MG/DL
DIFFERENTIAL METHOD: ABNORMAL
EOSINOPHIL # BLD AUTO: 0.2 K/UL
EOSINOPHIL NFR BLD: 2 %
ERYTHROCYTE [DISTWIDTH] IN BLOOD BY AUTOMATED COUNT: 18 %
EST. GFR  (AFRICAN AMERICAN): 32.9 ML/MIN/1.73 M^2
EST. GFR  (NON AFRICAN AMERICAN): 28.6 ML/MIN/1.73 M^2
GLUCOSE SERPL-MCNC: 88 MG/DL
HCT VFR BLD AUTO: 23.1 %
HGB BLD-MCNC: 7.5 G/DL
IMM GRANULOCYTES # BLD AUTO: 0.02 K/UL
IMM GRANULOCYTES NFR BLD AUTO: 0.3 %
LYMPHOCYTES # BLD AUTO: 1 K/UL
LYMPHOCYTES NFR BLD: 13.9 %
MAGNESIUM SERPL-MCNC: 2.1 MG/DL
MCH RBC QN AUTO: 24.5 PG
MCHC RBC AUTO-ENTMCNC: 32.5 G/DL
MCV RBC AUTO: 76 FL
MONOCYTES # BLD AUTO: 0.5 K/UL
MONOCYTES NFR BLD: 6.7 %
NEUTROPHILS # BLD AUTO: 5.7 K/UL
NEUTROPHILS NFR BLD: 76.6 %
NRBC BLD-RTO: 0 /100 WBC
PHOSPHATE SERPL-MCNC: 3 MG/DL
PHOSPHATE SERPL-MCNC: 3 MG/DL
PLATELET # BLD AUTO: 265 K/UL
PMV BLD AUTO: 11.1 FL
POCT GLUCOSE: 121 MG/DL (ref 70–110)
POCT GLUCOSE: 72 MG/DL (ref 70–110)
POCT GLUCOSE: 89 MG/DL (ref 70–110)
POTASSIUM SERPL-SCNC: 4.1 MMOL/L
RBC # BLD AUTO: 3.06 M/UL
SODIUM SERPL-SCNC: 137 MMOL/L
WBC # BLD AUTO: 7.46 K/UL

## 2019-01-09 PROCEDURE — 82040 ASSAY OF SERUM ALBUMIN: CPT

## 2019-01-09 PROCEDURE — 80069 RENAL FUNCTION PANEL: CPT

## 2019-01-09 PROCEDURE — 36415 COLL VENOUS BLD VENIPUNCTURE: CPT

## 2019-01-09 PROCEDURE — 85025 COMPLETE CBC W/AUTO DIFF WBC: CPT

## 2019-01-09 PROCEDURE — 63600175 PHARM REV CODE 636 W HCPCS: Performed by: STUDENT IN AN ORGANIZED HEALTH CARE EDUCATION/TRAINING PROGRAM

## 2019-01-09 PROCEDURE — 99239 PR HOSPITAL DISCHARGE DAY,>30 MIN: ICD-10-PCS | Mod: ,,, | Performed by: HOSPITALIST

## 2019-01-09 PROCEDURE — 83735 ASSAY OF MAGNESIUM: CPT

## 2019-01-09 PROCEDURE — 92526 ORAL FUNCTION THERAPY: CPT

## 2019-01-09 PROCEDURE — 84100 ASSAY OF PHOSPHORUS: CPT

## 2019-01-09 PROCEDURE — 25000003 PHARM REV CODE 250: Performed by: HOSPITALIST

## 2019-01-09 PROCEDURE — 99239 HOSP IP/OBS DSCHRG MGMT >30: CPT | Mod: ,,, | Performed by: HOSPITALIST

## 2019-01-09 RX ADMIN — HEPARIN SODIUM 5000 UNITS: 5000 INJECTION, SOLUTION INTRAVENOUS; SUBCUTANEOUS at 05:01

## 2019-01-09 RX ADMIN — FOLIC ACID 1 MG: 1 TABLET ORAL at 08:01

## 2019-01-09 RX ADMIN — Medication 100 MG: at 08:01

## 2019-01-09 RX ADMIN — POTASSIUM & SODIUM PHOSPHATES POWDER PACK 280-160-250 MG 1 PACKET: 280-160-250 PACK at 05:01

## 2019-01-09 RX ADMIN — POTASSIUM & SODIUM PHOSPHATES POWDER PACK 280-160-250 MG 1 PACKET: 280-160-250 PACK at 11:01

## 2019-01-09 RX ADMIN — VITAMIN D, TAB 1000IU (100/BT) 1000 UNITS: 25 TAB at 08:01

## 2019-01-09 RX ADMIN — AMLODIPINE BESYLATE 10 MG: 10 TABLET ORAL at 08:01

## 2019-01-09 RX ADMIN — THERA TABS 1 TABLET: TAB at 08:01

## 2019-01-09 NOTE — PROGRESS NOTES
Report called to nurse Swartz at [facility]. IV discontinued. Patient without complaints. Patient discharged via ambulance to [facility].

## 2019-01-09 NOTE — PT/OT/SLP PROGRESS
"Speech Language Pathology Treatment    Patient Name:  Cesilia Delgado   MRN:  [id number]  Admitting Diagnosis: Hypernatremia    Recommendations:                 General Recommendations:  Dysphagia therapy  Diet recommendations:  Puree, Liquid Diet Level: Thin   Aspiration Precautions: 1 bite/sip at a time, 1:1 Assistance with meals, Check for pocketing/oral residue, Feed only when awake/alert, Frequent oral care, HOB to 90 degrees, Meds crushed in puree, Monitor for s/s of aspiration, Remain upright 30 minutes post meal, Small bites/sips and Strict aspiration precautions   General Precautions: Standard, aspiration, pureed diet, fall  Communication strategies:  provide increased time to answer and go to room if call light pushed    Subjective     "maybe a little more" (pt stated of puree trials)  Nursing present and indicating pt to be discharged around noon.    Pain/Comfort:  Pain Rating 1: 0/10  Pain Rating Post-Intervention 1: 0/10    Objective:     Has the patient been evaluated by SLP for swallowing?   Yes  Keep patient NPO? No   Current Respiratory Status: room air      Pt awake/alert with no family present. She engaged in brief conversation with ST prior to po trials.   Pt only desired limited trials of puree and thin liquid.  Oral phase was c/b only mild holding of puree boluses prior to trigger of pharyngeal swallows.  No overt s/s of aspiration observed.  Pt denied any globus sensation.  PEG tube in place for primary nutrition.  Pt appears safe to continue puree/thin liquid pleasure intake with the above listed precautions.  Education provided to pt regarding recs and precautions. Pt left with CNA to prepare for likely discharge.    Assessment:     Cesilia Delgado is a 88 y.o. female with an SLP diagnosis of Dysphagia.      Goals:   Multidisciplinary Problems     SLP Goals        Problem: SLP Goal    Goal Priority Disciplines Outcome   SLP Goal     SLP Ongoing (interventions implemented as appropriate) "   Description:  Goals expected to be met by 1/14/19  1. Pt will tolerate puree diet with no overt s/s of aspiration.  2. Pt will participate in advanced diet trials with no overt s/s of aspiration to determine safest/least restrictive PO diet                           Plan:     · Patient to be seen:  3 x/week   · Plan of Care expires:  01/23/19  · Plan of Care reviewed with:  patient   · SLP Follow-Up:  Yes       Discharge recommendations:  nursing facility, skilled       Time Tracking:     SLP Treatment Date:   01/09/19  Speech Start Time:  1120  Speech Stop Time:  1129     Speech Total Time (min):  9 min    Billable Minutes: Treatment Swallowing Dysfunction 9    KIA Barros, CCC-SLP  Speech Language Pathologist  [phone]  1/9/2019

## 2019-01-09 NOTE — PLAN OF CARE
[facility], PHN trip auth # [id number]. Transport setup for 12:00pm. KYLER informed pt's nurse that she can call report to Lory at [phone]. KYLER informed Joy at [facility] that N will resend authorization. KYLER attempted to call Kristina, pt's daughter in law to inform her that will be discharge plans. KYLER left number requesting call back.     Joseph Mosqueda, HECTOR  [facility]  [phone]

## 2019-01-09 NOTE — PLAN OF CARE
Problem: Adult Inpatient Plan of Care  Goal: Plan of Care Review  Pt is disoriented x3, PEC tube feeding provided with rate of 25 cc /hr with goal of 35cc,residual checked-10 cc noted , water bolus given, blood glucose WNL, thermal blanket applied,antibiotic IV push  given , blood glucose WNL,PEC dressing changed, no drainage from PEC site noted,bed low, breaks locked, SR up x2, call light within reach, will continue to monitor.

## 2019-01-09 NOTE — PLAN OF CARE
Problem: SLP Goal  Goal: SLP Goal  Goals expected to be met by 1/14/19  1. Pt will tolerate puree diet with no overt s/s of aspiration.  2. Pt will participate in advanced diet trials with no overt s/s of aspiration to determine safest/least restrictive PO diet            Pt tolerated pleasure feedings of puree / thin liquid this date in ST session.  Continue current recommendations upon discharge.    Full session note to follow.     KIA Barros, CCC-SLP  Speech Language Pathologist  [phone]  1/9/2019

## 2019-01-16 NOTE — PT/OT/SLP DISCHARGE
Occupational Therapy Discharge Summary    Cesilia Delgado  MRN: [id number]   Principal Problem: Hypernatremia      Patient Discharged from acute Occupational Therapy on 1/16/19.  Please refer to prior OT note dated 1/7/19 for functional status.    Assessment:      Patient has not met goals.    Objective:     GOALS:   Multidisciplinary Problems     Occupational Therapy Goals     Not on file          Multidisciplinary Problems (Resolved)        Problem: Occupational Therapy Goal    Goal Priority Disciplines Outcome Interventions   Occupational Therapy Goal   (Resolved)     OT, PT/OT Outcome(s) achieved    Description:  Goals to be met by: 1/15     Patient will increase functional independence with ADLs by performing:    UE Dressing with Set-up Assistance. - not met  LE Dressing with Contact Guard Assistance. - not met  Grooming while seated with Set-up Assistance. - not met  Toileting from toilet with Contact Guard Assistance for hygiene and clothing management. - not met  Transfers with Minimal assistance. - not met  Supine to sit with contact guard assistance. - not met                          Reasons for Discontinuation of Therapy Services  Transfer to alternate level of care.      Plan:     Patient Discharged to: Skilled Nursing Facility    SANJU Mason  1/16/2019

## 2019-01-16 NOTE — PLAN OF CARE
Problem: Occupational Therapy Goal  Goal: Occupational Therapy Goal  Goals to be met by: 1/15     Patient will increase functional independence with ADLs by performing:    UE Dressing with Set-up Assistance. - not met  LE Dressing with Contact Guard Assistance. - not met  Grooming while seated with Set-up Assistance. - not met  Toileting from toilet with Contact Guard Assistance for hygiene and clothing management. - not met  Transfers with Minimal assistance. - not met  Supine to sit with contact guard assistance. - not met        Outcome: Outcome(s) achieved Date Met: 01/16/19  No goals met.  Hospital discharge.

## 2023-06-28 NOTE — ASSESSMENT & PLAN NOTE
-daughter states that patient was conducting ADLs independently up to 2 weeks and had acute decline in health where she stayed in bed a lot with insomnia   -patient's son passed away 08/2018  -would consider psych assessment for depression induced withdrawal of ADLs or advancing dementia after fluid resuscitation    Detail Level: Detailed Quality 226: Preventive Care And Screening: Tobacco Use: Screening And Cessation Intervention: Patient screened for tobacco use and is an ex/non-smoker Quality 130: Documentation Of Current Medications In The Medical Record: Current Medications Documented Quality 431: Preventive Care And Screening: Unhealthy Alcohol Use - Screening: Patient not identified as an unhealthy alcohol user when screened for unhealthy alcohol use using a systematic screening method

## 2025-03-14 NOTE — CONSULTS
Problem: At Risk for Falls  Goal: Patient does not fall  Outcome: Monitoring/Evaluating progress  Goal: Patient takes action to control fall-related risks  Outcome: Monitoring/Evaluating progress     Problem: At Risk for Injury Due to Fall  Goal: Patient does not fall  Outcome: Monitoring/Evaluating progress  Goal: Takes action to control condition specific risks  Outcome: Monitoring/Evaluating progress  Goal: Verbalizes understanding of fall-related injury personal risks  Description: Document education using the patient education activity  Outcome: Monitoring/Evaluating progress     Problem: Impaired Physical Mobility  Goal: Functional status is maintained or returned to baseline during hospitalization  Outcome: Monitoring/Evaluating progress  Goal: Tolerates activity for discharge setting with no abnormal symptoms  Outcome: Monitoring/Evaluating progress     Problem: Pain  Goal: Acceptable pain level achieved/maintained at rest using appropriate pain scale for the patient  Outcome: Monitoring/Evaluating progress  Goal: Acceptable pain level achieved/maintained with activity using appropriate pain scale for the patient  Outcome: Monitoring/Evaluating progress  Goal: Acceptable pain level achieved/maintained without oversedation  Outcome: Monitoring/Evaluating progress     Problem: Skin Integrity Alteration  Goal: Skin remains intact with no new/deterioration of wound or pressure injury  Outcome: Monitoring/Evaluating progress  Goal: Participates in wound care activities  Outcome: Monitoring/Evaluating progress     Problem: Delirium, Risk for  Goal: # No symptoms of delirium  Description: Evaluate delirium symptoms under active problem when present  Outcome: Monitoring/Evaluating progress  Goal: # Verbalizes understanding of delirium preventive strategies  Description: Document on Patient Education Activity   Outcome: Monitoring/Evaluating progress      [facility]  Palliative Medicine  Consult Note    Patient Name: Cesilia Delgado  MRN: [id number]  Admission Date: 12/22/2018  Hospital Length of Stay: 8 days  Code Status: Full Code   Attending Provider: Nabil Brandt MD  Consulting Provider: ALFREDO Haque  Primary Care Physician: Jena Alvarez MD  Principal Problem:Hypernatremia        Inpatient consult to Palliative Care  Consult performed by: ALFREDO Guido  Consult ordered by: Nabil Brandt MD  Reason for consult: end of life hospice   Assessment/Recommendations: Palliative care consult received for end of life hospice.  As per primary team attending notes: goals of care discussed with patient's next of kin- who has chosen SNF.  Primary team RN case manager has completed referrals and anticipated discharge is today 12/31/18.  Spoke with Dr. Brandt, unfortunately patient will not meet critieria for SNF.  Palliative care to discuss goals of care patient's family.  Full consult to follow.    Thank you for consult and opportunity to participate in Ms. Delgado's care.   Hanna Francis, APRN, ACNS-BC, OCN